# Patient Record
Sex: FEMALE | Employment: OTHER | URBAN - METROPOLITAN AREA
[De-identification: names, ages, dates, MRNs, and addresses within clinical notes are randomized per-mention and may not be internally consistent; named-entity substitution may affect disease eponyms.]

---

## 2018-02-04 ENCOUNTER — APPOINTMENT (OUTPATIENT)
Dept: GENERAL RADIOLOGY | Age: 63
End: 2018-02-04
Attending: EMERGENCY MEDICINE
Payer: MEDICARE

## 2018-02-04 ENCOUNTER — HOSPITAL ENCOUNTER (EMERGENCY)
Age: 63
Discharge: HOME | End: 2018-02-04
Attending: EMERGENCY MEDICINE | Admitting: EMERGENCY MEDICINE
Payer: MEDICARE

## 2018-02-04 VITALS
TEMPERATURE: 98.5 F | OXYGEN SATURATION: 100 % | DIASTOLIC BLOOD PRESSURE: 75 MMHG | RESPIRATION RATE: 16 BRPM | WEIGHT: 98.2 LBS | SYSTOLIC BLOOD PRESSURE: 122 MMHG | HEART RATE: 75 BPM | BODY MASS INDEX: 15.85 KG/M2

## 2018-02-04 DIAGNOSIS — Z43.1 ATTENTION TO G-TUBE (HCC): Primary | ICD-10-CM

## 2018-02-04 PROCEDURE — 99284 EMERGENCY DEPT VISIT MOD MDM: CPT

## 2018-02-04 PROCEDURE — 77030027899 HC CATH BLN GASTMY REPL BSC -B

## 2018-02-04 PROCEDURE — 75810000109 HC GASTRO TUBE CHANGE

## 2018-02-04 PROCEDURE — 74018 RADEX ABDOMEN 1 VIEW: CPT

## 2018-02-04 PROCEDURE — 74011636320 HC RX REV CODE- 636/320: Performed by: EMERGENCY MEDICINE

## 2018-02-04 RX ADMIN — DIATRIZOATE MEGLUMINE AND DIATRIZOATE SODIUM 30 ML: 600; 100 SOLUTION ORAL; RECTAL at 17:46

## 2018-02-04 NOTE — DISCHARGE INSTRUCTIONS
Home Tube Feeding: Care Instructions  Your Care Instructions  Tube feeding is a way of providing nutrition and fluids through a tube into the stomach or intestines. The tube may be inserted through the skin and into the stomach during surgery, or it may go through the mouth or nose, down the throat, and then into the stomach. Tube feeding can nourish people who have a short illness that makes swallowing difficult or people who have a severe illness, and it may prolong life. Follow-up care is a key part of your treatment and safety. Be sure to make and go to all appointments, and call your doctor if you are having problems. It's also a good idea to know your test results and keep a list of the medicines you take. How can you care for yourself at home? · Follow your doctor's instructions for use and care of the feeding tube. Your doctor will:  Nasreen [place] you what tube feeding formula and fluids to put through the tube. ¨ Show you how to care for the skin around the tube. Be sure to follow instructions on keeping the area clean. ¨ Teach you how to watch for infection or blockage of the tube. ¨ Tell you what activities you can do. · Keep the formula in the refrigerator after opening it. Do not let the formula in the hanging bag sit out at room temperature for more than 8 hours. For the caregiver  · Wash your hands before handling the tube and formula. Wash the top of the can of formula before you open it. · Tube feedings that go into the stomach: The person you are caring for needs to be sitting up or have his or her head up during the feeding and for 30 minutes afterward. These feedings can be given in about 30 minutes, five or six times throughout a day. · Tube feedings that go into the intestine: The person you are caring for will have a pump that slowly pushes the formula into the intestine over several hours. This is often done at night.   · If nausea, diarrhea, or stomach cramps happen during feeding, slow the rate that the formula comes through the tube. Then gradually increase the amount as the person can tolerate it. · Flush the tube with plain water after each feeding to keep it clean. Do not put anything other than formula or water through the tube unless your doctor has told you to. · Take care of yourself. ¨ Do not try to do everything yourself. Ask other family members to help, and find out what other types of help may be available. ¨ Eat well and get enough rest. Make sure you do not ignore your own health while you are caring for your loved one. ¨ Schedule time for yourself. Get out of the house to do things you enjoy, run errands, or go shopping. When should you call for help? Call your doctor now or seek immediate medical care if:  ? · You have signs of infection, such as:  ¨ Increased pain, swelling, warmth, or redness around the tube. ¨ Red streaks leading from the area where the tube is inserted. ¨ Pus draining from the tube area. ¨ A fever. ? · The tube comes out or becomes blocked. ? · You have nausea, vomiting, or diarrhea. ? Watch closely for changes in your health, and be sure to contact your doctor if:  ? · You have any problems with your feeding. Where can you learn more? Go to http://teresa-parviz.info/. Enter S558 in the search box to learn more about \"Home Tube Feeding: Care Instructions. \"  Current as of: May 12, 2017  Content Version: 11.4  © 3307-6567 [employer]. Care instructions adapted under license by [employer] (which disclaims liability or warranty for this information). If you have questions about a medical condition or this instruction, always ask your healthcare professional. [place] [address] any warranty or liability for your use of this information.

## 2018-02-04 NOTE — ED NOTES
Assumed care of patient from triage. Patient alert and sitting on bed. Patient's daughter at bedside. Per patient's daughter, patients G-tube is clogged and she is not able to administer patient's feedings. Daughter states that patient sees a GI doctor in [place]. Patient denies any pain. Denies any other complaints. Emergency Department Nursing Plan of Care       The Nursing Plan of Care is developed from the Nursing assessment and Emergency Department Attending provider initial evaluation. The plan of care may be reviewed in the ED Provider note.     The Plan of Care was developed with the following considerations:   Patient / Family readiness to learn indicated by:verbalized understanding  Persons(s) to be included in education: patient  Barriers to Learning/Limitations:No    Signed     Jaylon Luo RN    2/4/2018   5:06 PM

## 2018-02-04 NOTE — ED PROVIDER NOTES
EMERGENCY DEPARTMENT HISTORY AND PHYSICAL EXAM      Date: 2/4/2018  Patient Name: Cory Herr    History of Presenting Illness     Chief Complaint   Patient presents with    Feeding Tube Problem     family reports feeding tube became clogged today. History Provided By: Patient and Patient's daughter     HPI: Cory Herr, 60 y.o. female with PMHx significant for CKD, stroke, and HTN presents ambulatory to the ED with cc of a clogged feeding tube. The pt's daughter informs us that she was able to feed the patient this morning without any issues. Later on in the day she noticed that the pt's feeding tube had become clogged and a there was a small pinpoint hole in the tube. PCP: PROVIDER UNKNOWN    There are no other complaints, changes, or physical findings at this time. Current Outpatient Prescriptions   Medication Sig Dispense Refill    metoprolol tartrate (LOPRESSOR) 25 mg tablet Take 25 mg by mouth two (2) times a day.  metoclopramide HCl (REGLAN) 5 mg tablet Take 5 mg by mouth four (4) times daily.  POTASSIUM CHLORIDE PO 40 mEq by Feeding Tube route daily. 40 MEQ/5 ML      NUT. TX.IMPAIRED RENAL FXN,SOY (NEPRO PO) Take  by mouth. 1.5 CANS VIA FEEDING TUBE EVERY 4 HOURS PER DAUGHTER      aspirin 81 mg chewable tablet Take 81 mg by mouth daily.  CHOLECALCIFEROL, VITAMIN D3, (VITAMIN D3 PO) Take  by mouth Every Mon, Wed & Sun.         Past History     Past Medical History:  Past Medical History:   Diagnosis Date    Chronic kidney disease     ESRD/ARF Dialysi Tues-Thurs- Sat    Hypertension     Stroke ([facility] [address] 2013       Past Surgical History:  No past surgical history on file. Family History:  No family history on file. Social History:  Social History   Substance Use Topics    Smoking status: Never Smoker    Smokeless tobacco: None    Alcohol use No       Allergies:  No Known Allergies      Review of Systems   Review of Systems   Constitutional: Negative.   Negative for activity change, chills and diaphoresis. HENT: Negative. Negative for ear pain, trouble swallowing and voice change. Eyes: Negative. Respiratory: Negative for chest tightness and shortness of breath. Cardiovascular: Negative for chest pain, palpitations and leg swelling. Gastrointestinal: Negative. Negative for constipation, nausea and vomiting.        +G tube blockage   Endocrine: Negative. Genitourinary: Negative. Negative for flank pain, frequency and hematuria. Musculoskeletal: Negative. Skin: Negative. Allergic/Immunologic: Negative. Neurological: Negative. Hematological: Negative. Psychiatric/Behavioral: Negative. All other systems reviewed and are negative. Physical Exam   Physical Exam   Constitutional: She is oriented to person, place, and time. She appears well-developed and well-nourished. HENT:   Head: Normocephalic. Mouth/Throat: Oropharynx is clear and moist.   Eyes: Conjunctivae and EOM are normal. Pupils are equal, round, and reactive to light. Neck: Normal range of motion. Neck supple. Cardiovascular: Normal rate, regular rhythm, normal heart sounds and intact distal pulses. Pulmonary/Chest: Effort normal and breath sounds normal.   Abdominal: Soft. Bowel sounds are normal. She exhibits no distension. There is no rebound. G-tube in place   Musculoskeletal: Normal range of motion. She exhibits no edema or deformity. Neurological: She is alert and oriented to person, place, and time. Skin: Skin is warm and dry. Psychiatric: She has a normal mood and affect. Her behavior is normal. Judgment and thought content normal.   Nursing note and vitals reviewed. Diagnostic Study Results     EXAM:  XR ABD (KUB)     INDICATION:  Abdominal Pain. Clogged feeding tube today.     COMPARISON: None.     FINDINGS: A supine radiograph of the abdomen shows a percutaneous gastrostomy  tube with contrast material demonstrated within the stomach and duodenum. No  extraluminal contrast is shown. There is no bowel dilation. The lung bases are  normal. The bones are mildly osteopenic. Severe right hip osteoarthrosis is  shown as are postsurgical changes in the lower lumbar spine.     IMPRESSION  IMPRESSION: No evidence of bowel obstruction. Contrast material placed through  G-tube localized within stomach and duodenum. Medical Decision Making   I am the first provider for this patient. I reviewed the vital signs, available nursing notes, past medical history, past surgical history, family history and social history. Vital Signs-Reviewed the patient's vital signs. Patient Vitals for the past 12 hrs:   Temp Pulse Resp BP SpO2   02/04/18 1623 98.5 °F (36.9 °C) 75 16 122/75 100 %       Records Reviewed: Nursing Notes and Old Medical Records    Provider Notes (Medical Decision Making):   DDx: G-tube malfunction. Failure to the integrity of the tube at the exit of the skin and at the triple pool. ED Course:   Initial assessment performed. The patients presenting problems have been discussed, and they are in agreement with the care plan formulated and outlined with them. I have encouraged them to ask questions as they arise throughout their visit. 5:18 PM  The pt's malfunctioning NG tube will be replaced with a new one in the ED.     5:32 PM  The pt's NG tube was successfully swapped out with a new one. The placement of the tube will be confirmed with xray. 6:12 PM  BJ's final results have been reviewed with her. She has been counseled regarding her diagnosis. She verbally conveys understanding and agreement of the signs, symptoms, diagnosis, treatment and prognosis . Disposition:  DISCHARGE NOTE  6:12 PM  The patient has been re-evaluated and is ready for discharge. Reviewed available results with patient. Counseled pt on diagnosis and care plan. Pt has expressed understanding, and all questions have been answered.  Pt agrees with plan and agrees to F/U as recommended, or return to the ED if their sxs worsen. Discharge instructions have been provided and explained to the pt, along with reasons to return to the ED. PLAN:  1. Current Discharge Medication List        2. Follow-up Information     Follow up With Details Comments Contact Info    Phys Other, MD Call  Patient can only remember the practice name and not the physician      [facility] EMERGENCY DEPT  As needed, If symptoms worsen [address] [facility]  [phone]        Return to ED if worse     Diagnosis     Clinical Impression:   1. Attention to G-tube [facility])        Attestations: This note is prepared by Jovanna Vogel, acting as Scribe for Dr. Whitley Romero MD.    Dr. Whitley Romero MD: The scribe's documentation has been prepared under my direction and personally reviewed by me in its entirety. I confirm that the note above accurately reflects all work, treatment, procedures, and medical decision making performed by me.

## 2018-02-04 NOTE — ED NOTES
Patient (s)   given copy of dc instructions and  script(s). Patient(s)   verbalized understanding of instructions and script (s). Patient given a current medication reconciliation form and verbalized understanding of their medications. Patient (s)  verbalized understanding of the importance of discussing medications with  his or her physician or clinic when they follow up. Patient alert and oriented and in no acute distress. Pt verbalizes pain scale of 0 out of 10. Patient discharged home via [facility] with family.

## 2018-02-04 NOTE — LETTER
[facility] EMERGENCY DEPT 
[address] [place] [address] [address] [phone] Work/School Note Date: 2/4/2018 To Whom It May concern: 
 
Isidra Alvarez was seen and treated today in the emergency room by the following provider(s): 
Attending Provider: Lisa Sanches MD. Donaldo Garcia's daughter may return to work on 2/7/18. Sincerely, Lisa Sanches MD

## 2018-09-06 PROBLEM — R32 URINARY INCONTINENCE: Status: ACTIVE | Noted: 2018-09-06

## 2018-09-06 PROBLEM — G47.00 INSOMNIA: Status: ACTIVE | Noted: 2018-09-06

## 2018-09-06 PROBLEM — I10 HTN (HYPERTENSION): Status: ACTIVE | Noted: 2018-09-06

## 2018-09-06 PROBLEM — F01.53 VASCULAR DEMENTIA WITH DEPRESSED MOOD: Status: ACTIVE | Noted: 2018-09-06

## 2018-09-06 RX ORDER — METOCLOPRAMIDE 5 MG/1
5 TABLET ORAL
COMMUNITY
End: 2020-01-01 | Stop reason: SDUPTHER

## 2018-09-06 RX ORDER — HYDROXYZINE HYDROCHLORIDE 10 MG/5ML
SYRUP ORAL
COMMUNITY
End: 2018-11-02

## 2018-09-06 RX ORDER — DOCUSATE SODIUM 100 MG/1
100 CAPSULE, LIQUID FILLED ORAL 2 TIMES DAILY
COMMUNITY
End: 2020-01-01

## 2018-09-06 RX ORDER — GUAIFENESIN 100 MG/5ML
81 LIQUID (ML) ORAL DAILY
COMMUNITY
End: 2018-11-02 | Stop reason: SDUPTHER

## 2018-11-02 ENCOUNTER — OFFICE VISIT (OUTPATIENT)
Dept: FAMILY MEDICINE CLINIC | Age: 63
End: 2018-11-02

## 2018-11-02 VITALS
SYSTOLIC BLOOD PRESSURE: 99 MMHG | HEART RATE: 86 BPM | DIASTOLIC BLOOD PRESSURE: 63 MMHG | BODY MASS INDEX: 16.55 KG/M2 | TEMPERATURE: 99 F | HEIGHT: 66 IN | WEIGHT: 103 LBS | OXYGEN SATURATION: 96 %

## 2018-11-02 DIAGNOSIS — Z00.00 MEDICARE ANNUAL WELLNESS VISIT, SUBSEQUENT: ICD-10-CM

## 2018-11-02 DIAGNOSIS — Z99.2 STAGE 5 CHRONIC KIDNEY DISEASE ON CHRONIC DIALYSIS (HCC): ICD-10-CM

## 2018-11-02 DIAGNOSIS — R05.9 COUGH: ICD-10-CM

## 2018-11-02 DIAGNOSIS — I10 ESSENTIAL HYPERTENSION: Primary | ICD-10-CM

## 2018-11-02 DIAGNOSIS — N18.6 STAGE 5 CHRONIC KIDNEY DISEASE ON CHRONIC DIALYSIS (HCC): ICD-10-CM

## 2018-11-02 RX ORDER — AZITHROMYCIN 250 MG/1
TABLET, FILM COATED ORAL
Qty: 6 TAB | Refills: 0 | Status: SHIPPED | OUTPATIENT
Start: 2018-11-02 | End: 2018-11-07

## 2018-11-02 RX ORDER — BENZONATATE 200 MG/1
200 CAPSULE ORAL
Qty: 30 CAP | Refills: 0 | Status: SHIPPED | OUTPATIENT
Start: 2018-11-02 | End: 2018-11-09

## 2018-11-02 NOTE — PROGRESS NOTES
Rosita Montgomery is a 60 y.o. female Chief Complaint Patient presents with  Complete Physical  
 Medication Refill 1. Have you been to the ER, urgent care clinic since your last visit? Hospitalized since your last visit? no 
 
 
2. Have you seen or consulted any other health care providers outside of the [address] [place] [address] since your last visit? Include any pap smears or colon screening.   no

## 2018-11-02 NOTE — PATIENT INSTRUCTIONS
Medicare Wellness Visit, Female The best way to live healthy is to have a lifestyle where you eat a well-balanced diet, exercise regularly, limit alcohol use, and quit all forms of tobacco/nicotine, if applicable. Regular preventive services are another way to keep healthy. Preventive services (vaccines, screening tests, monitoring & exams) can help personalize your care plan, which helps you manage your own care. Screening tests can find health problems at the earliest stages, when they are easiest to treat. [facility] follows the current, evidence-based guidelines published by the [facility] Rodney Yuridia ([facility]) when recommending preventive services for our patients. Because we follow these guidelines, sometimes recommendations change over time as research supports it. (For example, mammograms used to be recommended annually. Even though Medicare will still pay for an annual mammogram, the newer guidelines recommend a mammogram every two years for women of average risk.) Of course, you and your doctor may decide to screen more often for some diseases, based on your risk and your health status. Preventive services for you include: - Medicare offers their members a free annual wellness visit, which is time for you and your primary care provider to discuss and plan for your preventive service needs. Take advantage of this benefit every year! 
-All adults over the age of 72 should receive the recommended pneumonia vaccines. Current USPSTF guidelines recommend a series of two vaccines for the best pneumonia protection.  
-All adults should have a flu vaccine yearly and a tetanus vaccine every 10 years. All adults age 61 and older should receive a shingles vaccine once in their lifetime.   
-A bone mass density test is recommended when a woman turns 65 to screen for osteoporosis. This test is only recommended one time, as a screening. Some providers will use this same test as a disease monitoring tool if you already have osteoporosis. -All adults age 38-68 who are overweight should have a diabetes screening test once every three years.  
-Other screening tests and preventive services for persons with diabetes include: an eye exam to screen for diabetic retinopathy, a kidney function test, a foot exam, and stricter control over your cholesterol.  
-Cardiovascular screening for adults with routine risk involves an electrocardiogram (ECG) at intervals determined by your doctor.  
-Colorectal cancer screenings should be done for adults age 54-65 with no increased risk factors for colorectal cancer. There are a number of acceptable methods of screening for this type of cancer. Each test has its own benefits and drawbacks. Discuss with your doctor what is most appropriate for you during your annual wellness visit. The different tests include: colonoscopy (considered the best screening method), a fecal occult blood test, a fecal DNA test, and sigmoidoscopy. -Breast cancer screenings are recommended every other year for women of normal risk, age 54-69. 
-Cervical cancer screenings for women over age 72 are only recommended with certain risk factors.  
-All adults born between [facility] should be screened once for Hepatitis C. Here is a list of your current Health Maintenance items (your personalized list of preventive services) with a due date: 
Health Maintenance Due Topic Date Due  
 Hepatitis C Test  1955  DTaP/Tdap/Td  (1 - Tdap) 12/15/1975  Cervical Cancer Screening  12/15/1975  Shingles Vaccine (1 of 2) 12/15/2004  Breast Cancer Screening  12/15/2004  Stool testing for trace blood  12/15/2004  Flu Vaccine  08/01/2018 Jabari Annual Well Visit  09/07/2018 Cough: Care Instructions Your Care Instructions A cough is your body's response to something that bothers your throat or airways. Many things can cause a cough. You might cough because of a cold or the flu, bronchitis, or asthma. Smoking, postnasal drip, allergies, and stomach acid that backs up into your throat also can cause coughs. A cough is a symptom, not a disease. Most coughs stop when the cause, such as a cold, goes away. You can take a few steps at home to cough less and feel better. Follow-up care is a key part of your treatment and safety. Be sure to make and go to all appointments, and call your doctor if you are having problems. It's also a good idea to know your test results and keep a list of the medicines you take. How can you care for yourself at home? · Drink lots of water and other fluids. This helps thin the mucus and soothes a dry or sore throat. Honey or lemon juice in hot water or tea may ease a dry cough. · Take cough medicine as directed by your doctor. · Prop up your head on pillows to help you breathe and ease a dry cough. · Try cough drops to soothe a dry or sore throat. Cough drops don't stop a cough. Medicine-flavored cough drops are no better than candy-flavored drops or hard candy. · Do not smoke. Avoid secondhand smoke. If you need help quitting, talk to your doctor about stop-smoking programs and medicines. These can increase your chances of quitting for good. When should you call for help? Call 911 anytime you think you may need emergency care. For example, call if: 
  · You have severe trouble breathing.  
 Call your doctor now or seek immediate medical care if: 
  · You cough up blood.  
  · You have new or worse trouble breathing.  
  · You have a new or higher fever.  
  · You have a new rash.  
 Watch closely for changes in your health, and be sure to contact your doctor if: 
  · You cough more deeply or more often, especially if you notice more mucus or a change in the color of your mucus.  
  · You have new symptoms, such as a sore throat, an earache, or sinus pain.   · You do not get better as expected. Where can you learn more? Go to http://teresa-parviz.info/. Enter D279 in the search box to learn more about \"Cough: Care Instructions. \" Current as of: December 6, 2017 Content Version: 11.8 © 1126-4957 [employer]. Care instructions adapted under license by [employer] (which disclaims liability or warranty for this information). If you have questions about a medical condition or this instruction, always ask your healthcare professional. [address] any warranty or liability for your use of this information. Medicare Wellness Visit, Female The best way to live healthy is to have a lifestyle where you eat a well-balanced diet, exercise regularly, limit alcohol use, and quit all forms of tobacco/nicotine, if applicable. Regular preventive services are another way to keep healthy. Preventive services (vaccines, screening tests, monitoring & exams) can help personalize your care plan, which helps you manage your own care. Screening tests can find health problems at the earliest stages, when they are easiest to treat. [facility] follows the current, evidence-based guidelines published by the [place] Rodney Yuridia (USPSTF) when recommending preventive services for our patients. Because we follow these guidelines, sometimes recommendations change over time as research supports it. (For example, mammograms used to be recommended annually. Even though Medicare will still pay for an annual mammogram, the newer guidelines recommend a mammogram every two years for women of average risk.) Of course, you and your doctor may decide to screen more often for some diseases, based on your risk and your health status. Preventive services for you include: - Medicare offers their members a free annual wellness visit, which is time for you and your primary care provider to discuss and plan for your preventive service needs. Take advantage of this benefit every year! 
-All adults over the age of 72 should receive the recommended pneumonia vaccines. Current USPSTF guidelines recommend a series of two vaccines for the best pneumonia protection.  
-All adults should have a flu vaccine yearly and a tetanus vaccine every 10 years. All adults age 61 and older should receive a shingles vaccine once in their lifetime.   
-A bone mass density test is recommended when a woman turns 65 to screen for osteoporosis. This test is only recommended one time, as a screening. Some providers will use this same test as a disease monitoring tool if you already have osteoporosis. -All adults age 38-68 who are overweight should have a diabetes screening test once every three years.  
-Other screening tests and preventive services for persons with diabetes include: an eye exam to screen for diabetic retinopathy, a kidney function test, a foot exam, and stricter control over your cholesterol.  
-Cardiovascular screening for adults with routine risk involves an electrocardiogram (ECG) at intervals determined by your doctor.  
-Colorectal cancer screenings should be done for adults age 54-65 with no increased risk factors for colorectal cancer. There are a number of acceptable methods of screening for this type of cancer. Each test has its own benefits and drawbacks. Discuss with your doctor what is most appropriate for you during your annual wellness visit. The different tests include: colonoscopy (considered the best screening method), a fecal occult blood test, a fecal DNA test, and sigmoidoscopy.  
-Breast cancer screenings are recommended every other year for women of normal risk, age 54-69. 
-Cervical cancer screenings for women over age 72 are only recommended with certain risk factors.  
-All adults born between [birth year] and 1966 should be screened once for Hepatitis C. Here is a list of your current Health Maintenance items (your personalized list of preventive services) with a due date: 
Health Maintenance Due Topic Date Due  
 Hepatitis C Test  1955  DTaP/Tdap/Td  (1 - Tdap) 12/15/1975  Cervical Cancer Screening  12/15/1975  Shingles Vaccine (1 of 2) 12/15/2004  Breast Cancer Screening  12/15/2004  Stool testing for trace blood  12/15/2004

## 2018-11-02 NOTE — PROGRESS NOTES
Assessment/Plan:  
 
Diagnoses and all orders for this visit: 1. Essential hypertension 
 -Stable, daughter will call back for refill (does not know if is tartrate or succinate)  Monitor BP at home to make sure it is not dropping too low 2. Stage 5 chronic kidney disease on chronic dialysis ([facility] [address] -managed by specialist 
 
3. Medicare annual wellness visit, subsequent 
 -labs ordered by dialysis team 
 
4. Cough 
-     benzonatate (TESSALON) 200 mg capsule; Take 1 Cap by mouth three (3) times daily as needed for Cough for up to 7 days. -     azithromycin (ZITHROMAX) 250 mg tablet; Take 2 tablets today, then take 1 tablet daily -     XR CHEST PA LAT; Future - Worsening, start zpack today, chest xray today Follow-up Disposition: 
Return in about 1 year (around 11/2/2019). Discussed expected course/resolution/complications of diagnosis in detail with patient.   
Medication risks/benefits/costs/interactions/alternatives discussed with patient.   
Pt was given after visit summary which includes diagnoses, current medications & vitals. Pt expressed understanding with the diagnosis and plan Subjective:  
  
Issac Harvey is a 60 y.o. female who presents for had concerns including Complete Physical and Medication Refill. Here today with daughter and son for CPE and med refill. Here with daughter. Has a history of hypertension. On chronic dialysis. Takes 25mg Metoprolol twice a day. BP today is 99/63 today. Does not drink alcohol. Wheelchair bound. Daughter reports home is safe with grab bars and no rugs for when Ms. Gosia Stephens does get out of the wheelchair. Daughter reports diminished hearing but does not want hearing aids. Daughter states that Dialysis has given her the flu vaccine. Does not get mammograms or pap smears anymore. Cough Has had a cough for 4 days getting worse. Cough is non-productive. Use robitussin with minimal relief. Denies fever.   Denies every having pneumonia. Has had both pneumonia immunizations. Current Outpatient Medications Medication Sig Dispense Refill  benzonatate (TESSALON) 200 mg capsule Take 1 Cap by mouth three (3) times daily as needed for Cough for up to 7 days. 30 Cap 0  
 azithromycin (ZITHROMAX) 250 mg tablet Take 2 tablets today, then take 1 tablet daily 6 Tab 0  METOPROLOL SUCCINATE PO Take  by mouth.  docusate sodium (COLACE) 100 mg capsule Take 100 mg by mouth two (2) times a day.  metoprolol tartrate (LOPRESSOR) 25 mg tablet Take 25 mg by mouth two (2) times a day.  POTASSIUM CHLORIDE PO 40 mEq by Feeding Tube route daily. 40 MEQ/5 ML    
 NUT. TX.IMPAIRED RENAL FXN,SOY (NEPRO PO) Take  by mouth. 1.5 CANS VIA FEEDING TUBE EVERY 4 HOURS PER DAUGHTER  aspirin 81 mg chewable tablet Take 81 mg by mouth daily.  CHOLECALCIFEROL, VITAMIN D3, (VITAMIN D3 PO) Take  by mouth Every Mon, Wed & Sun.    
 metoclopramide HCl (REGLAN) 5 mg tablet Take 5 mg by mouth Before breakfast, lunch, and dinner.  DARBEPOETIN JORDAN IN POLYSORBAT INJECTION by Injection route.  metoclopramide HCl (REGLAN) 5 mg tablet Take 5 mg by mouth four (4) times daily. No Known Allergies ROS:  
Review of Systems Constitutional: Negative for fever. HENT: Positive for congestion. Negative for ear pain and sore throat. Respiratory: Positive for cough. Negative for sputum production and wheezing. Cardiovascular: Negative for chest pain. Skin: Negative for rash. Neurological: Negative for dizziness and headaches. Objective:  
 
Visit Vitals BP 99/63 (BP 1 Location: Right arm, BP Patient Position: Sitting) Pulse 86 Temp 99 °F (37.2 °C) (Oral) Ht 5' 6\" (1.676 m) Wt 103 lb (46.7 kg) LMP  (LMP Unknown) SpO2 96% BMI 16.62 kg/m² Vitals and Nurse Documentation reviewed. Physical Exam  
Constitutional: She is well-developed, well-nourished, and in no distress. No distress.   
HENT:  
 Head: Normocephalic and atraumatic. Cardiovascular: Normal rate, regular rhythm and normal heart sounds. Exam reveals no gallop and no friction rub. No murmur heard. Pulmonary/Chest: Effort normal and breath sounds normal. No respiratory distress. She has no wheezes. She has no rales. Musculoskeletal:  
     Right ankle: She exhibits swelling. Left ankle: She exhibits swelling. Lymphadenopathy:  
     Right cervical: No superficial cervical adenopathy present. Left cervical: No superficial cervical adenopathy present. Neurological: She is alert. Skin: She is not diaphoretic. Psychiatric: Affect normal.  
 
 
Results for orders placed or performed during the hospital encounter of 09/16/16 EKG, 12 LEAD, INITIAL Result Value Ref Range Ventricular Rate 70 BPM  
 Atrial Rate 70 BPM  
 P-R Interval 124 ms QRS Duration 78 ms Q-T Interval 430 ms QTC Calculation (Bezet) 464 ms Calculated P Axis 68 degrees Calculated R Axis 57 degrees Calculated T Axis 44 degrees Diagnosis Normal sinus rhythm Right atrial enlargement No previous ECGs available Confirmed by Marcela Pelayo MD, [facility] ([id number]) on 9/16/2016 2:51:32 PM

## 2018-11-07 RX ORDER — METOPROLOL TARTRATE 25 MG/1
25 TABLET, FILM COATED ORAL 2 TIMES DAILY
Qty: 180 TAB | Refills: 1 | Status: SHIPPED | OUTPATIENT
Start: 2018-11-07 | End: 2019-08-14 | Stop reason: SDUPTHER

## 2018-11-07 NOTE — PROGRESS NOTES
This is the Subsequent Medicare Annual Wellness Exam, performed 12 months or more after the Initial AWV or the last Subsequent AWV I have reviewed the patient's medical history in detail and updated the computerized patient record. History Past Medical History:  
Diagnosis Date  CHF (congestive heart failure) ([facility] [address] 10/2013  Chronic kidney disease ESRD/ARF Dialysi Tu-Thurs- Sat  Hypertension  Insomnia  Severe mitral regurgitation 2013  Stroke [facility])   Urinary incontinence  Vascular dementia with depressed mood Past Surgical History:  
Procedure Laterality Date  HX BACK SURGERY    
 3 herniated discs  HX  SECTION Current Outpatient Medications Medication Sig Dispense Refill  benzonatate (TESSALON) 200 mg capsule Take 1 Cap by mouth three (3) times daily as needed for Cough for up to 7 days. 30 Cap 0  
 azithromycin (ZITHROMAX) 250 mg tablet Take 2 tablets today, then take 1 tablet daily 6 Tab 0  
 docusate sodium (COLACE) 100 mg capsule Take 100 mg by mouth two (2) times a day.  POTASSIUM CHLORIDE PO 40 mEq by Feeding Tube route daily. 40 MEQ/5 ML    
 NUT. TX.IMPAIRED RENAL FXN,SOY (NEPRO PO) Take  by mouth. 1.5 CANS VIA FEEDING TUBE EVERY 4 HOURS PER DAUGHTER  aspirin 81 mg chewable tablet Take 81 mg by mouth daily.  CHOLECALCIFEROL, VITAMIN D3, (VITAMIN D3 PO) Take  by mouth Every Mon, Wed & Sun.    
 metoprolol tartrate (LOPRESSOR) 25 mg tablet Take 1 Tab by mouth two (2) times a day. 180 Tab 1  
 metoclopramide HCl (REGLAN) 5 mg tablet Take 5 mg by mouth Before breakfast, lunch, and dinner.  DARBEPOETIN JORDAN IN POLYSORBAT INJECTION by Injection route.  metoclopramide HCl (REGLAN) 5 mg tablet Take 5 mg by mouth four (4) times daily. No Known Allergies Family History Problem Relation Age of Onset  Diabetes Mother Type 2  
 Heart Failure Father  Diabetes Sister 3 sisters  Diabetes Brother   
     3 brothers  Hypertension Daughter Social History Tobacco Use  Smoking status: Former Smoker Types: Cigarettes Last attempt to quit: 2013 Years since quittin.8  Smokeless tobacco: Never Used Substance Use Topics  Alcohol use: No  
 
Patient Active Problem List  
Diagnosis Code  Vascular dementia with depressed mood F01.51, F32.9  
 Urinary incontinence R32  
 HTN (hypertension) I10  
 Insomnia G47.00  Chronic kidney disease N18.9  Hypertension I10 Depression Risk Factor Screening: No flowsheet data found. Alcohol Risk Factor Screening: You do not drink alcohol or very rarely. Functional Ability and Level of Safety:  
Hearing Loss Hearing is good. Activities of Daily Living The home contains: handrails and grab bars Patient needs help with:  transportation, shopping, preparing meals, laundry, housework, managing medications, eating, dressing, bathing and hygiene Fall Risk No flowsheet data found. Abuse Screen Patient is not abused Cognitive Screening Evaluation of Cognitive Function: 
Has your family/caregiver stated any concerns about your memory: no 
Abnormal 
 
Patient Care Team  
Patient Care Team: 
Yuki Herrera NP as PCP - General (Nurse Practitioner) Sunni Reynolds MD (Cardiology) Timothy Green MD (Nephrology) Hipolito Daniels MD (Neurology) Assessment/Plan Education and counseling provided: 
Are appropriate based on today's review and evaluation Pneumococcal Vaccine Influenza Vaccine Diagnoses and all orders for this visit: 1. Essential hypertension 
 -stable, monitor bp at home, watch for too low BP 2. Stage 5 chronic kidney disease on chronic dialysis ([facility] [address] -managed by specialist 
 
3. Medicare annual wellness visit, subsequent 4. Cough 
-     benzonatate (TESSALON) 200 mg capsule; Take 1 Cap by mouth three (3) times daily as needed for Cough for up to 7 days. -     azithromycin (ZITHROMAX) 250 mg tablet; Take 2 tablets today, then take 1 tablet daily -     XR CHEST PA LAT; Future Health Maintenance Due Topic Date Due  
 Hepatitis C Screening  1955  DTaP/Tdap/Td series (1 - Tdap) 12/15/1975  PAP AKA CERVICAL CYTOLOGY  12/15/1975  Shingrix Vaccine Age 50> (1 of 2) 12/15/2004  BREAST CANCER SCRN MAMMOGRAM  12/15/2004  FOBT Q 1 YEAR AGE 50-75  12/15/2004

## 2019-02-08 ENCOUNTER — TELEPHONE (OUTPATIENT)
Dept: FAMILY MEDICINE CLINIC | Age: 64
End: 2019-02-08

## 2019-02-08 NOTE — TELEPHONE ENCOUNTER
TC to Mrs. Sabrina Barnes daughter,  Stephen Grimaldo we need to get [address] fax number to fax over completed Serious Medical Condition Certification Form. Form fax to @ [phone] Also copy  mailed to Mrs. Sabrina Barnes home and  A copy mailed to [facility].           - Ebenezer

## 2019-05-06 ENCOUNTER — HOSPITAL ENCOUNTER (OUTPATIENT)
Dept: GENERAL RADIOLOGY | Age: 64
Discharge: HOME | End: 2019-05-06
Payer: MEDICARE

## 2019-05-06 DIAGNOSIS — Z93.1 S/P PERCUTANEOUS ENDOSCOPIC GASTROSTOMY (PEG) TUBE PLACEMENT (HCC): ICD-10-CM

## 2019-05-06 DIAGNOSIS — R13.10 DYSPHAGIA: ICD-10-CM

## 2019-05-06 PROCEDURE — 92611 MOTION FLUOROSCOPY/SWALLOW: CPT

## 2019-05-06 PROCEDURE — 74230 X-RAY XM SWLNG FUNCJ C+: CPT

## 2019-05-06 NOTE — PROGRESS NOTES
[address] [place] [address]    Speech Pathology Modified barium swallow Study with cms g codes  Patient: Shola Colón (69 y.o. female)  Date: 2019  Referring Provider:     SUBJECTIVE:   Pt had a CVA in Oct, 2013. Her course was long and complicated. A PEG was placed in  per daughter. The family has been giving her some po over the past 6 months. She developed pneumonia in Dec but the daughter did not think it was aspiration related. OBJECTIVE:   Past Medical History:   Past Medical History:   Diagnosis Date    CHF (congestive heart failure) ([address] 10/2013    Chronic kidney disease     ESRD/ARF Dialysi - Sat    Hypertension     Insomnia     Severe mitral regurgitation 2013    [facility]roke [facility])     Urinary incontinence     Vascular dementia with depressed mood      Past Surgical History:   Procedure Laterality Date    HX BACK SURGERY      3 herniated discs    HX  SECTION       Current Dietary Status:  NPO with PEG feedings   Radiologist: Dr. Jairo Reddy Views: Lateral;Fluoro  Patient Position: sitting upright in transmotion chair    Trial 1:   Consistency Presented: Thin liquid; Nectar thick liquid;Honey thick liquid;Puree; Solid   How Presented: Cup/sip       Bolus Acceptance: No impairment   Bolus Formation/Control: Impaired: Delayed;Mastication;Posterior;Premature spillage   Propulsion: Delayed (# of seconds)   Oral Residue: Lingual       Timing: Pooling 1-5 sec   Penetration: Flash/transient;Trace;During swallow; To cords; To laryngeal vestibule;From initial swallow(with honey thick liquids ; just on underside of the epiglottis)   Aspiration/Timing: Before;During;From initial swallow   Pharyngeal Clearance: No residue                       Decreased Tongue Base Retraction?: No  Laryngeal Elevation: Incomplete laryngeal closure  Aspiration/Penetration Score: 7 (Aspiration-Contrast passes below the cords/, but is not ejected despite attempt)             Oral Phase Severity: Mild-moderate  Pharyngeal Phase Severity: Moderate      Based on the objective assessment provided within this note, the NOMS would be 4          NOMS Swallowing Levels:  Level 1 (CN): NPO  Level 2 (CM): NPO but takes consistency in therapy  Level 3 (CL): Takes less than 50% of nutrition p.o. and continues with nonoral feedings; and/or safe with mod cues; and/or max diet restriction  Level 4 (CK): Safe swallow but needs mod cues; and/or mod diet restriction; and/or still requires some nonoral feeding/supplements  Level 5 (CJ): Safe swallow with min diet restriction; and/or needs min cues  Level 6 (CI): Independent with p.o.; rare cues; usually self cues; may need to avoid some foods or needs extra time  Level 7 ([address] [place] [address]): Independent for all p.o.  LAUREN. (2003). National Outcomes Measurement System (NOMS): Adult Speech-Language Pathology User's Guide. ASSESSMENT :  Based on the objective data described above, the patient presents with mild to mod oral and mod pharyngeal dysphagia. Orally she accepted the cup and spoon appropriately. Mastication was mildly slow as was posterior propulsion with purees and solids. Premature spillage of all liquid consistencies was noted with the swallow triggered at the level of the pyriform sinus. With purees the swallow was mild to mod delayed. There was no pharyngeal residue. With thins there was aspiration before /during the swallow due to swallow delay and reduced laryngeal closure. She coughed but could not clear the significant aspiration. With nectar thick liquids there was trace penetration during the swallow that was mid vestibule but penetrated deeper to the TVCs after the swallow that was not cleared. With honey the liquids penetrated under the epiglottis and cleared with subsequent swallows. Tolerated purees and solids.       PLAN/RECOMMENDATIONS :  Soft diet with honey thick liquids; small bites and sips  Discussed with the pt's daughter and caregiver, Bora Blazing that all liquids must be honey thick. Showed Paula Shaw and Bora Blazing a picture of the thickener online and discussed purchasing it at a drugstore. They asked if it is paid for by insurance and they were informed it is not. They asked when the PEG could come out and they were informed only after pt is eating and drinking enough to warrant that. Concern is for getting sufficient hydration with honey thick liquids. That will be for the physician to decide. Follow up at [facility] for OP follow up would be helpful to insure they are following through as educated. COMMUNICATION/EDUCATION:   The above findings and recommendations were discussed with: Registered Nurse who verbalized understanding.     Thank you for this referral.  YISEL Simon  Time Calculation: 20 mins

## 2019-07-09 ENCOUNTER — TELEPHONE (OUTPATIENT)
Dept: FAMILY MEDICINE CLINIC | Age: 64
End: 2019-07-09

## 2019-07-09 NOTE — TELEPHONE ENCOUNTER
Layla Pandya', coordinator, for [facility] called to let us know that on July 2nd at the Dialysis Ctr, she flat lined while placing a port placement in her chest, so she was sent to the H/O. She received a blood transfusion on July 3rd. Was d/c on July 4th. Will be seen by home health this Friday to reassess her. No reply is needed.   Thank you,  indra

## 2019-07-29 ENCOUNTER — TELEPHONE (OUTPATIENT)
Dept: FAMILY MEDICINE CLINIC | Age: 64
End: 2019-07-29

## 2019-07-29 NOTE — TELEPHONE ENCOUNTER
Vanessa from [facility] called asking for a nurse to give her call back, she evaluated Ms Deja Ramos post hospital visit and would like to update our nursing staff on her status for her PCP Np Montague.  She was admitted on 7/2, discharged 7/4    Areli Godfrey: [phone]

## 2019-08-02 NOTE — TELEPHONE ENCOUNTER
TC to Mrs. Magalie Stone daughter Corinne Vargas, she was told that we received a call from Mrs. Raymond Escoto @ [facility] and she stated  At the visit with the  patient had some increased verbal outburst also issues with sun downing .       Per Corinne Vargas , her mom is having issues but she would like to make her mom a appt for hand swelling for now, she was transferred to the  for a appt

## 2019-08-14 ENCOUNTER — OFFICE VISIT (OUTPATIENT)
Dept: FAMILY MEDICINE CLINIC | Age: 64
End: 2019-08-14

## 2019-08-14 VITALS
BODY MASS INDEX: 20.89 KG/M2 | WEIGHT: 130 LBS | TEMPERATURE: 98.5 F | DIASTOLIC BLOOD PRESSURE: 92 MMHG | RESPIRATION RATE: 16 BRPM | OXYGEN SATURATION: 99 % | SYSTOLIC BLOOD PRESSURE: 174 MMHG | HEIGHT: 66 IN | HEART RATE: 84 BPM

## 2019-08-14 DIAGNOSIS — F05 SUNDOWNING: Primary | ICD-10-CM

## 2019-08-14 DIAGNOSIS — L03.113 CELLULITIS OF RIGHT UPPER EXTREMITY: ICD-10-CM

## 2019-08-14 DIAGNOSIS — I10 ESSENTIAL HYPERTENSION: ICD-10-CM

## 2019-08-14 DIAGNOSIS — Z43.1 ATTENTION TO G-TUBE (HCC): ICD-10-CM

## 2019-08-14 DIAGNOSIS — F51.04 PSYCHOPHYSIOLOGICAL INSOMNIA: ICD-10-CM

## 2019-08-14 DIAGNOSIS — Z99.2 STAGE 5 CHRONIC KIDNEY DISEASE ON CHRONIC DIALYSIS (HCC): ICD-10-CM

## 2019-08-14 DIAGNOSIS — N18.6 STAGE 5 CHRONIC KIDNEY DISEASE ON CHRONIC DIALYSIS (HCC): ICD-10-CM

## 2019-08-14 RX ORDER — TRAZODONE HYDROCHLORIDE 50 MG/1
TABLET ORAL
Qty: 30 TAB | Refills: 1 | Status: SHIPPED | OUTPATIENT
Start: 2019-08-14 | End: 2020-01-01 | Stop reason: SDUPTHER

## 2019-08-14 RX ORDER — DOXYCYCLINE 100 MG/1
100 TABLET ORAL 2 TIMES DAILY
Qty: 20 TAB | Refills: 0 | Status: SHIPPED | OUTPATIENT
Start: 2019-08-14 | End: 2019-08-24

## 2019-08-14 RX ORDER — METOPROLOL TARTRATE 50 MG/1
50 TABLET ORAL 2 TIMES DAILY
Qty: 60 TAB | Refills: 1 | Status: SHIPPED | OUTPATIENT
Start: 2019-08-14 | End: 2020-01-01 | Stop reason: SDUPTHER

## 2019-08-14 NOTE — PROGRESS NOTES
Identified pt with two pt identifiers(name and ). Reviewed record in preparation for visit and have obtained necessary documentation. Chief Complaint   Patient presents with    Hand Swelling     Right hand swelling x        Health Maintenance Due   Topic    Hepatitis C Screening     Pneumococcal 0-64 years (1 of 3 - PCV13)    DTaP/Tdap/Td series (1 - Tdap)    PAP AKA CERVICAL CYTOLOGY     Shingrix Vaccine Age 50> (1 of 2)    BREAST CANCER SCRN MAMMOGRAM     FOBT Q 1 YEAR AGE 54-65     Influenza Age 5 to Adult        Coordination of Care Questionnaire:  :   1) Have you been to an emergency room, urgent care, or hospitalized since your last visit? If yes, where when, and reason for visit? Yes, ER      2. Have seen or consulted any other health care provider since your last visit? If yes, where when, and reason for visit? NO        Patient is accompanied by self I have received verbal consent from Dante Oviedo to discuss any/all medical information while they are present in the room.

## 2019-08-14 NOTE — PROGRESS NOTES
Toro Pierce is a 58 y.o. female who presents today with the following:    HPI  Chief Complaint   Patient presents with    Hand Swelling     Right hand swelling x       1. Sundowning    2. Attention to G-tube [facility])    3. Stage 5 chronic kidney disease on chronic dialysis (HCC)    4. Cellulitis of right upper extremity    5. Essential hypertension    6. Psychophysiological insomnia    Patient is here for follow-up of right hand swelling and erythema. Associated symptoms include pain. Patient has an AV fistula in place on the right arm and swelling is coming from that. Patient is being followed by vascular and nephrology. The plan is to close the AV fistula on the right hand and place another fistula. Patient was previously given a short course of antibiotic which did improve some of the swelling and erythema in the right hand. Patient continues to have erythema now and requests a second course of antibiotic to resolve cellulitis. Patient's daughter reports patient gets severe anxious and confused at the end of the day. She also has insomnia. Review of Systems   Constitutional: Negative. Wheelchair-bound   HENT: Negative. Eyes: Negative. Respiratory: Negative. Cardiovascular: Negative. Gastrointestinal: Negative. G-tube in place   Genitourinary: Negative. Musculoskeletal: Negative. Hand pain and swelling   Skin: Negative. Neurological: Negative. Endo/Heme/Allergies: Negative. Psychiatric/Behavioral: Positive for memory loss. The patient is nervous/anxious. Physical Exam   Constitutional: She is oriented to person, place, and time and well-developed, well-nourished, and in no distress. HENT:   Head: Normocephalic and atraumatic. Right Ear: External ear normal.   Left Ear: External ear normal.   Nose: Nose normal.   Mouth/Throat: No oropharyngeal exudate. Eyes: Conjunctivae are normal.   Neck: Normal range of motion. Neck supple.  No thyromegaly present. Cardiovascular: Normal rate and regular rhythm. Pulmonary/Chest: Effort normal and breath sounds normal.   Abdominal: Soft. Bowel sounds are normal. She exhibits no distension. There is no tenderness. G-tube in place   Musculoskeletal: Normal range of motion. She exhibits no edema. Lymphadenopathy:     She has no cervical adenopathy. Neurological: She is alert and oriented to person, place, and time. Skin: Skin is warm and dry. There is erythema. Psychiatric: Mood and affect normal.   Nursing note and vitals reviewed. BP (!) 174/92 (BP 1 Location: Left arm, BP Patient Position: Sitting)   Pulse 84   Temp 98.5 °F (36.9 °C) (Oral)   Resp 16   Ht 5' 6\" (1.676 m)   Wt 130 lb (59 kg)   SpO2 99%   BMI 20.98 kg/m²     No Known Allergies    Current Outpatient Medications   Medication Sig    doxycycline (ADOXA) 100 mg tablet 1 Tab by Per G Tube route two (2) times a day for 10 days.  metoprolol tartrate (LOPRESSOR) 50 mg tablet Take 1 Tab by mouth two (2) times a day.  traZODone (DESYREL) 50 mg tablet TAKE 1/2 TAB PER G TUBE AT BEDTIME    metoclopramide HCl (REGLAN) 5 mg tablet Take 5 mg by mouth Before breakfast, lunch, and dinner.  docusate sodium (COLACE) 100 mg capsule Take 100 mg by mouth two (2) times a day.  DARBEPOETIN JORDAN IN POLYSORBAT INJECTION by Injection route.  metoclopramide HCl (REGLAN) 5 mg tablet Take 5 mg by mouth four (4) times daily.  POTASSIUM CHLORIDE PO 40 mEq by Feeding Tube route daily. 40 MEQ/5 ML    NUT. TX.IMPAIRED RENAL FXN,SOY (NEPRO PO) Take  by mouth. 1.5 CANS VIA FEEDING TUBE EVERY 4 HOURS PER DAUGHTER    aspirin 81 mg chewable tablet Take 81 mg by mouth daily.  CHOLECALCIFEROL, VITAMIN D3, (VITAMIN D3 PO) Take  by mouth Every Mon, Wed & Sun. No current facility-administered medications for this visit.         Past Medical History:   Diagnosis Date    CHF (congestive heart failure) ([facility] [address] 10/2013    Chronic kidney disease ESRD/ARF Dialysi Thurs Sat    Hypertension     Insomnia     Severe mitral regurgitation 2013    [facility])     Urinary incontinence     Vascular dementia with depressed mood        Past Surgical History:   Procedure Laterality Date    HX BACK SURGERY      3 herniated discs    HX  SECTION         Problem List  Date Reviewed: 2019          Codes Class Noted    Chronic kidney disease ICD-10-CM: N18.9  ICD-9-CM: 653. 9  Unknown    Overview Signed 2018  8:57 AM by Ciara Salinas LPN     ESRD/ARF Dialysi TuesThurs- Sat             Hypertension ICD-10-CM: I10  ICD-9-CM: 401.9  Unknown        Vascular dementia with depressed mood ICD-10-CM: F01.51, F32.9  ICD-9-CM: 290.43  2018        Urinary incontinence ICD-10-CM: R32  ICD-9-CM: 788.30  2018        HTN (hypertension) ICD-10-CM: I10  ICD-9-CM: 401.9  2018        Insomnia ICD-10-CM: G47.00  ICD-9-CM: 780.52  2018               No results found for this visit on 19. 1. Attention to G-tube ([facility] [address]  Stable    2. Stage 5 chronic kidney disease on chronic dialysis ([facility] [address]  On dialysis    3. Sundowning    - traZODone (DESYREL) 50 mg tablet; TAKE 1/2 TAB PER G TUBE AT BEDTIME  Dispense: 30 Tab; Refill: 1    4. Cellulitis of right upper extremity  Patient is scheduled to get AV fistula repair.  - doxycycline (ADOXA) 100 mg tablet; 1 Tab by Per G Tube route two (2) times a day for 10 days. Dispense: 20 Tab; Refill: 0    5. Essential hypertension    - metoprolol tartrate (LOPRESSOR) 50 mg tablet; Take 1 Tab by mouth two (2) times a day. Dispense: 60 Tab; Refill: 1    6. Psychophysiological insomnia    - traZODone (DESYREL) 50 mg tablet; TAKE 1/2 TAB PER G TUBE AT BEDTIME  Dispense: 30 Tab; Refill: 1        Follow-up and Dispositions    · Return in about 2 weeks (around 2019) for follow up, BP. I have discussed the diagnosis with the patient and the intended plan as seen in the above orders.   The patient has received an after-visit summary and questions were answered concerning future plans. I have discussed medication side effects and warnings with the patient as well. The patient agrees and understands above plan.            Marcello Fonseca MD

## 2019-08-23 ENCOUNTER — ANESTHESIA (OUTPATIENT)
Dept: SURGERY | Age: 64
End: 2019-08-23
Payer: MEDICARE

## 2019-08-23 ENCOUNTER — ANESTHESIA EVENT (OUTPATIENT)
Dept: SURGERY | Age: 64
End: 2019-08-23
Payer: MEDICARE

## 2019-08-23 ENCOUNTER — HOSPITAL ENCOUNTER (OUTPATIENT)
Age: 64
Setting detail: OUTPATIENT SURGERY
Discharge: HOME | End: 2019-08-23
Attending: SURGERY | Admitting: SURGERY
Payer: MEDICARE

## 2019-08-23 VITALS
DIASTOLIC BLOOD PRESSURE: 72 MMHG | RESPIRATION RATE: 16 BRPM | SYSTOLIC BLOOD PRESSURE: 149 MMHG | OXYGEN SATURATION: 99 % | HEART RATE: 74 BPM | TEMPERATURE: 98.8 F

## 2019-08-23 DIAGNOSIS — N18.6 ESRD (END STAGE RENAL DISEASE) (HCC): Primary | ICD-10-CM

## 2019-08-23 LAB
ANION GAP BLD CALC-SCNC: 14 MMOL/L (ref 10–20)
BUN BLD-MCNC: 51 MG/DL (ref 9–20)
CA-I BLD-MCNC: 0.99 MMOL/L (ref 1.12–1.32)
CHLORIDE BLD-SCNC: 105 MMOL/L (ref 98–107)
CO2 BLD-SCNC: 23 MMOL/L (ref 21–32)
CREAT BLD-MCNC: 7.1 MG/DL (ref 0.6–1.3)
GLUCOSE BLD-MCNC: 93 MG/DL (ref 65–100)
HCT VFR BLD CALC: 36 % (ref 35–47)
POTASSIUM BLD-SCNC: 4.7 MMOL/L (ref 3.5–5.1)
SERVICE CMNT-IMP: ABNORMAL
SODIUM BLD-SCNC: 136 MMOL/L (ref 136–145)

## 2019-08-23 PROCEDURE — 76210000020 HC REC RM PH II FIRST 0.5 HR: Performed by: SURGERY

## 2019-08-23 PROCEDURE — 74011250636 HC RX REV CODE- 250/636: Performed by: SURGERY

## 2019-08-23 PROCEDURE — 77030039266 HC ADH SKN EXOFIN S2SG -A: Performed by: SURGERY

## 2019-08-23 PROCEDURE — 77030002996 HC SUT SLK J&J -A: Performed by: SURGERY

## 2019-08-23 PROCEDURE — 76010000138 HC OR TIME 0.5 TO 1 HR: Performed by: SURGERY

## 2019-08-23 PROCEDURE — 77030002933 HC SUT MCRYL J&J -A: Performed by: SURGERY

## 2019-08-23 PROCEDURE — 76060000032 HC ANESTHESIA 0.5 TO 1 HR: Performed by: SURGERY

## 2019-08-23 PROCEDURE — 74011250636 HC RX REV CODE- 250/636: Performed by: NURSE ANESTHETIST, CERTIFIED REGISTERED

## 2019-08-23 PROCEDURE — 77030018836 HC SOL IRR NACL ICUM -A: Performed by: SURGERY

## 2019-08-23 PROCEDURE — 74011000272 HC RX REV CODE- 272: Performed by: SURGERY

## 2019-08-23 PROCEDURE — 74011000250 HC RX REV CODE- 250: Performed by: SURGERY

## 2019-08-23 PROCEDURE — 77030031139 HC SUT VCRL2 J&J -A: Performed by: SURGERY

## 2019-08-23 PROCEDURE — 77030020782 HC GWN BAIR PAWS FLX 3M -B

## 2019-08-23 PROCEDURE — 74011250636 HC RX REV CODE- 250/636: Performed by: ANESTHESIOLOGY

## 2019-08-23 PROCEDURE — 80047 BASIC METABLC PNL IONIZED CA: CPT

## 2019-08-23 PROCEDURE — 76210000006 HC OR PH I REC 0.5 TO 1 HR: Performed by: SURGERY

## 2019-08-23 PROCEDURE — 74011000250 HC RX REV CODE- 250: Performed by: NURSE ANESTHETIST, CERTIFIED REGISTERED

## 2019-08-23 PROCEDURE — 77030011640 HC PAD GRND REM COVD -A: Performed by: SURGERY

## 2019-08-23 PROCEDURE — 74011000258 HC RX REV CODE- 258: Performed by: NURSE ANESTHETIST, CERTIFIED REGISTERED

## 2019-08-23 RX ORDER — SODIUM CHLORIDE, SODIUM LACTATE, POTASSIUM CHLORIDE, CALCIUM CHLORIDE 600; 310; 30; 20 MG/100ML; MG/100ML; MG/100ML; MG/100ML
INJECTION, SOLUTION INTRAVENOUS
Status: DISCONTINUED | OUTPATIENT
Start: 2019-08-23 | End: 2019-08-23

## 2019-08-23 RX ORDER — SODIUM CHLORIDE 0.9 % (FLUSH) 0.9 %
5-40 SYRINGE (ML) INJECTION EVERY 8 HOURS
Status: DISCONTINUED | OUTPATIENT
Start: 2019-08-23 | End: 2019-08-23 | Stop reason: HOSPADM

## 2019-08-23 RX ORDER — OXYCODONE HYDROCHLORIDE 5 MG/1
5 TABLET ORAL
Qty: 20 TAB | Refills: 0 | Status: SHIPPED | OUTPATIENT
Start: 2019-08-23 | End: 2019-08-26

## 2019-08-23 RX ORDER — SODIUM CHLORIDE 9 MG/ML
50 INJECTION, SOLUTION INTRAVENOUS CONTINUOUS
Status: DISCONTINUED | OUTPATIENT
Start: 2019-08-23 | End: 2019-08-23 | Stop reason: HOSPADM

## 2019-08-23 RX ORDER — OXYCODONE AND ACETAMINOPHEN 5; 325 MG/1; MG/1
1 TABLET ORAL AS NEEDED
Status: DISCONTINUED | OUTPATIENT
Start: 2019-08-23 | End: 2019-08-23 | Stop reason: HOSPADM

## 2019-08-23 RX ORDER — MIDAZOLAM HYDROCHLORIDE 1 MG/ML
0.5 INJECTION, SOLUTION INTRAMUSCULAR; INTRAVENOUS
Status: DISCONTINUED | OUTPATIENT
Start: 2019-08-23 | End: 2019-08-23 | Stop reason: HOSPADM

## 2019-08-23 RX ORDER — FENTANYL CITRATE 50 UG/ML
50 INJECTION, SOLUTION INTRAMUSCULAR; INTRAVENOUS AS NEEDED
Status: DISCONTINUED | OUTPATIENT
Start: 2019-08-23 | End: 2019-08-23 | Stop reason: HOSPADM

## 2019-08-23 RX ORDER — ONDANSETRON 2 MG/ML
4 INJECTION INTRAMUSCULAR; INTRAVENOUS AS NEEDED
Status: DISCONTINUED | OUTPATIENT
Start: 2019-08-23 | End: 2019-08-23 | Stop reason: HOSPADM

## 2019-08-23 RX ORDER — SODIUM CHLORIDE 0.9 % (FLUSH) 0.9 %
5-40 SYRINGE (ML) INJECTION AS NEEDED
Status: DISCONTINUED | OUTPATIENT
Start: 2019-08-23 | End: 2019-08-23 | Stop reason: HOSPADM

## 2019-08-23 RX ORDER — SODIUM CHLORIDE, SODIUM LACTATE, POTASSIUM CHLORIDE, CALCIUM CHLORIDE 600; 310; 30; 20 MG/100ML; MG/100ML; MG/100ML; MG/100ML
75 INJECTION, SOLUTION INTRAVENOUS CONTINUOUS
Status: DISCONTINUED | OUTPATIENT
Start: 2019-08-23 | End: 2019-08-23 | Stop reason: HOSPADM

## 2019-08-23 RX ORDER — FENTANYL CITRATE 50 UG/ML
25 INJECTION, SOLUTION INTRAMUSCULAR; INTRAVENOUS
Status: DISCONTINUED | OUTPATIENT
Start: 2019-08-23 | End: 2019-08-23 | Stop reason: HOSPADM

## 2019-08-23 RX ORDER — MIDAZOLAM HYDROCHLORIDE 1 MG/ML
1 INJECTION, SOLUTION INTRAMUSCULAR; INTRAVENOUS AS NEEDED
Status: DISCONTINUED | OUTPATIENT
Start: 2019-08-23 | End: 2019-08-23 | Stop reason: HOSPADM

## 2019-08-23 RX ORDER — MORPHINE SULFATE 10 MG/ML
2 INJECTION, SOLUTION INTRAMUSCULAR; INTRAVENOUS
Status: DISCONTINUED | OUTPATIENT
Start: 2019-08-23 | End: 2019-08-23 | Stop reason: HOSPADM

## 2019-08-23 RX ORDER — PROPOFOL 10 MG/ML
INJECTION, EMULSION INTRAVENOUS
Status: DISCONTINUED | OUTPATIENT
Start: 2019-08-23 | End: 2019-08-23 | Stop reason: HOSPADM

## 2019-08-23 RX ORDER — DIPHENHYDRAMINE HYDROCHLORIDE 50 MG/ML
12.5 INJECTION, SOLUTION INTRAMUSCULAR; INTRAVENOUS AS NEEDED
Status: DISCONTINUED | OUTPATIENT
Start: 2019-08-23 | End: 2019-08-23 | Stop reason: HOSPADM

## 2019-08-23 RX ORDER — LIDOCAINE HYDROCHLORIDE 10 MG/ML
0.1 INJECTION, SOLUTION EPIDURAL; INFILTRATION; INTRACAUDAL; PERINEURAL AS NEEDED
Status: DISCONTINUED | OUTPATIENT
Start: 2019-08-23 | End: 2019-08-23 | Stop reason: HOSPADM

## 2019-08-23 RX ORDER — HYDROMORPHONE HYDROCHLORIDE 1 MG/ML
0.2 INJECTION, SOLUTION INTRAMUSCULAR; INTRAVENOUS; SUBCUTANEOUS
Status: DISCONTINUED | OUTPATIENT
Start: 2019-08-23 | End: 2019-08-23 | Stop reason: HOSPADM

## 2019-08-23 RX ORDER — SODIUM CHLORIDE 9 MG/ML
INJECTION, SOLUTION INTRAVENOUS
Status: DISCONTINUED | OUTPATIENT
Start: 2019-08-23 | End: 2019-08-23 | Stop reason: HOSPADM

## 2019-08-23 RX ORDER — CEFAZOLIN SODIUM/WATER 2 G/20 ML
2 SYRINGE (ML) INTRAVENOUS
Status: COMPLETED | OUTPATIENT
Start: 2019-08-23 | End: 2019-08-23

## 2019-08-23 RX ORDER — PROPOFOL 10 MG/ML
INJECTION, EMULSION INTRAVENOUS AS NEEDED
Status: DISCONTINUED | OUTPATIENT
Start: 2019-08-23 | End: 2019-08-23 | Stop reason: HOSPADM

## 2019-08-23 RX ADMIN — PROPOFOL 10 MG: 10 INJECTION, EMULSION INTRAVENOUS at 15:02

## 2019-08-23 RX ADMIN — Medication 2 G: at 14:42

## 2019-08-23 RX ADMIN — SODIUM CHLORIDE: 900 INJECTION, SOLUTION INTRAVENOUS at 14:38

## 2019-08-23 RX ADMIN — PROPOFOL 50 MG: 10 INJECTION, EMULSION INTRAVENOUS at 15:05

## 2019-08-23 RX ADMIN — SODIUM CHLORIDE 4 MCG: 900 INJECTION, SOLUTION INTRAVENOUS at 15:03

## 2019-08-23 RX ADMIN — PROPOFOL 35 MCG/KG/MIN: 10 INJECTION, EMULSION INTRAVENOUS at 14:39

## 2019-08-23 RX ADMIN — SODIUM CHLORIDE 50 ML/HR: 900 INJECTION, SOLUTION INTRAVENOUS at 14:03

## 2019-08-23 RX ADMIN — SODIUM CHLORIDE 4 MCG: 900 INJECTION, SOLUTION INTRAVENOUS at 15:02

## 2019-08-23 RX ADMIN — SODIUM CHLORIDE 0.3 MCG/KG/HR: 900 INJECTION, SOLUTION INTRAVENOUS at 14:39

## 2019-08-23 RX ADMIN — PROPOFOL 40 MG: 10 INJECTION, EMULSION INTRAVENOUS at 14:58

## 2019-08-23 RX ADMIN — SODIUM CHLORIDE 4 MCG: 900 INJECTION, SOLUTION INTRAVENOUS at 15:01

## 2019-08-23 NOTE — ANESTHESIA PREPROCEDURE EVALUATION
Anesthetic History   No history of anesthetic complications            Review of Systems / Medical History  Patient summary reviewed, nursing notes reviewed and pertinent labs reviewed    Pulmonary  Within defined limits                 Neuro/Psych       CVA  TIA     Cardiovascular    Hypertension      CHF             GI/Hepatic/Renal         Renal disease: ESRD       Endo/Other  Within defined limits           Other Findings   Comments: Vascular dementia           Physical Exam    Airway  Mallampati: III  TM Distance: 4 - 6 cm  Neck ROM: normal range of motion   Mouth opening: Normal     Cardiovascular    Rhythm: regular  Rate: normal         Dental    Dentition: Poor dentition     Pulmonary  Breath sounds clear to auscultation               Abdominal  Abdominal exam normal       Other Findings            Anesthetic Plan    ASA: 3  Anesthesia type: MAC          Induction: Intravenous  Anesthetic plan and risks discussed with: Patient

## 2019-08-23 NOTE — ANESTHESIA POSTPROCEDURE EVALUATION
Post-Anesthesia Evaluation and Assessment    Patient: Heather Davis MRN: [id number]  SSN: [id number]    YOB: 1955  Age: 58 y.o. Sex: female      I have evaluated the patient and they are stable and ready for discharge from the PACU. Cardiovascular Function/Vital Signs  Visit Vitals  BP (P) 107/64   Pulse (P) 86   Temp 37.1 °C (98.7 °F)   Resp (P) 16   SpO2 (P) 98%       Patient is status post MAC anesthesia for Procedure(s):  LIGATION OF RIGHT ARM ARTERIO-VENOUS GRAFT. Nausea/Vomiting: None    Postoperative hydration reviewed and adequate. Pain:  Pain Scale 1: Numeric (0 - 10) (08/23/19 1357)  Pain Intensity 1: 0 (08/23/19 1357)   Managed    Neurological Status:   Neuro (WDL): Exceptions to WDL(pain and weakness bilat legs) (08/23/19 1343)   At baseline    Mental Status, Level of Consciousness: Alert and  oriented to person, place, and time    Pulmonary Status:   O2 Device: (P) Room air (08/23/19 1530)   Adequate oxygenation and airway patent    Complications related to anesthesia: None    Post-anesthesia assessment completed.  No concerns    Signed By: Harriet Nissen, MD     August 23, 2019

## 2019-08-23 NOTE — BRIEF OP NOTE
BRIEF OPERATIVE NOTE    Date of Procedure: 8/23/2019   Preoperative Diagnosis: END STAGE RENAL DISEASE  Postoperative Diagnosis: * No post-op diagnosis entered *    Procedure(s):  LIGATION OF RIGHT ARM ARTERIO-VENOUS GRAFT  Surgeon(s) and Role:     Emilee Hernández MD - Primary  Surgical Assistant: Fany Pitt  Surgical Staff:  Circ-1: Norbert Ortega RN  Scrub Tech-1: Pasha Guerra  Scrub Tech-Relief: Nikko Calle  Surg Asst-1: Sanjana Lucero  Surg Asst-Relief: Speedy Bauer  Event Time In Time Out   Incision Start 1500    Incision Close       Anesthesia: MAC   Estimated Blood Loss: minimal  Specimens: * No specimens in log *   Findings: mild edema around graft  Complications: none  Implants: * No implants in log *

## 2019-08-23 NOTE — PERIOP NOTES
Patient: Swati Cueto MRN: [id number]  SSN: [id number]   YOB: 1955  Age: 58 y.o. Sex: female     Patient is status post Procedure(s):  LIGATION OF RIGHT ARM ARTERIO-VENOUS GRAFT. Surgeon(s) and Role:     Emilee Hernández MD - Primary    Local/Dose/Irrigation:  7 ml 1% Lidocaine with Sodium Bicarb. Injected to right upper arm.                  Peripheral IV 08/23/19 Left Forearm (Active)   Site Assessment Clean, dry, & intact 8/23/2019  2:00 PM   Phlebitis Assessment 0 8/23/2019  2:00 PM   Infiltration Assessment 0 8/23/2019  2:00 PM   Dressing Status Clean, dry, & intact 8/23/2019  2:00 PM   Dressing Type Transparent 8/23/2019  2:00 PM   Hub Color/Line Status Blue 8/23/2019  2:00 PM   Alcohol Cap Used Yes 8/23/2019  2:00 PM                       Dressing/Packing:  Wound Arm Right-Dressing Type: Topical skin adhesive/glue (08/23/19 1500)

## 2019-08-24 NOTE — OP NOTES
[address]  OPERATIVE REPORT    Name:  Esperanza Stephens  MR#:  [id number]  :  1955  ACCOUNT #:  [de-identified]  DATE OF SERVICE:  2019    PREOPERATIVE DIAGNOSIS:  End-stage renal disease and swelling. POSTOPERATIVE DIAGNOSIS:  End-stage renal disease and swelling. PROCEDURE PERFORMED:  Ligation of right arm graft. SURGEON:  MD Yolanda Hernandez. ANESTHESIA:  Local with sedation. COMPLICATIONS:  None. SPECIMENS REMOVED:  None. IMPLANTS:  none. ESTIMATED BLOOD LOSS:  Minimal.    DRAINS:  None. INDICATIONS FOR PROCEDURE:  The patient is a 41-year-old severely demented female with a right arm graft and severe right arm swelling that has been causing her problems, including cellulitis. 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 She presents for graft ligation to prevent arm swelling and problems. She is dialyzing via a tunneled catheter. She has been the catheter only patient due to her significant medical problems to avoid further surgery. Risks and benefits of the procedure were discussed with the patient's daughter. They voiced understanding and wished to proceed. PROCEDURE:  The patient was placed supine on the operating room table, sedation was established by Anesthesia Department. Right arm was prepped and draped in standard surgical fashion. Portion of the graft just beyond the anastomosis were identified. The skin above this was infiltrated with 1% lidocaine. A small incision was made here longitudinally along the graft, taken down through the skin and subcutaneous tissue. The graft was easily identified, dissected out right angle, was placed underneath and two 0 silk ties were used to ligate the graft. Following this, the wound was irrigated with antibiotic solution, closed the layers with 3-0 Vicryl followed by 4-0 Monocryl subcuticular skin closure and skin glue.   Counts were correct at the end of the case x2. The patient was then awoken and transported to the recovery room in stable condition.       Jose Alejandro Marino MD [email or web address]  D:  08/23/2019 15:21  T:  08/23/2019 22:09  JOB #:  [id number]  CC:  Yo Kurtz NP

## 2019-09-05 ENCOUNTER — OP HISTORICAL/CONVERTED ENCOUNTER (OUTPATIENT)
Dept: OTHER | Age: 64
End: 2019-09-05

## 2019-09-06 ENCOUNTER — TELEPHONE (OUTPATIENT)
Dept: FAMILY MEDICINE CLINIC | Age: 64
End: 2019-09-06

## 2019-09-06 NOTE — TELEPHONE ENCOUNTER
Vanessa Hernandez [facility] called regarding pt. Pt has been in and out or the ER multiple times. [phone] asking nurse to call back to provide nurse a update.

## 2019-09-11 ENCOUNTER — TELEPHONE (OUTPATIENT)
Dept: FAMILY MEDICINE CLINIC | Age: 64
End: 2019-09-11

## 2019-09-11 NOTE — TELEPHONE ENCOUNTER
Taran Kerr from [facility] returning call to [place] regarding patient    Tel: [phone]

## 2019-09-13 ENCOUNTER — OFFICE VISIT (OUTPATIENT)
Dept: FAMILY MEDICINE CLINIC | Age: 64
End: 2019-09-13

## 2019-09-13 VITALS
RESPIRATION RATE: 18 BRPM | BODY MASS INDEX: 17.52 KG/M2 | SYSTOLIC BLOOD PRESSURE: 127 MMHG | HEIGHT: 66 IN | HEART RATE: 68 BPM | TEMPERATURE: 98.6 F | WEIGHT: 109 LBS | DIASTOLIC BLOOD PRESSURE: 78 MMHG | OXYGEN SATURATION: 100 %

## 2019-09-13 DIAGNOSIS — K06.8 BLEEDING GUMS: ICD-10-CM

## 2019-09-13 DIAGNOSIS — Z99.2 STAGE 5 CHRONIC KIDNEY DISEASE ON CHRONIC DIALYSIS (HCC): Primary | ICD-10-CM

## 2019-09-13 DIAGNOSIS — F01.53 VASCULAR DEMENTIA WITH DEPRESSED MOOD: ICD-10-CM

## 2019-09-13 DIAGNOSIS — M79.89 SWELLING OF RIGHT HAND: ICD-10-CM

## 2019-09-13 DIAGNOSIS — N18.6 STAGE 5 CHRONIC KIDNEY DISEASE ON CHRONIC DIALYSIS (HCC): Primary | ICD-10-CM

## 2019-09-13 PROBLEM — R63.39 IMPAIRED ORAL FEEDING: Status: ACTIVE | Noted: 2019-09-13

## 2019-09-13 NOTE — PROGRESS NOTES
Julia Dozier is a 58 y.o. female      Chief Complaint   Patient presents with   [facility] Follow Up     Bleeding gums 9/6/19         1. Have you been to the ER, urgent care clinic since your last visit? Hospitalized since your last visit? Yes  Bleeding gums 9/6/19    2. Have you seen or consulted any other health care providers outside of the [address] [place] [address] since your last visit? Include any pap smears or colon screening.   No

## 2019-09-13 NOTE — PATIENT INSTRUCTIONS
Learning About Dementia  What is dementia? We all forget things as we get older. Many older people have a slight loss of memory that does not affect their daily lives. But memory loss that gets worse may mean that you have dementia. Dementia is a loss of mental skills that affects your daily life. It can cause problems with memory, problem-solving, and learning. It also can cause problems with thinking and planning. Dementia usually gets worse over time. But how quickly it gets worse is different for each person. Some people stay the same for years. Others lose skills quickly. Your chances of having dementia rise as you get older. But this doesn't mean that everyone will get it. How is dementia diagnosed? To diagnose dementia, your doctor will:  · Do a physical exam.  · Ask questions about recent and past illnesses and life events. The doctor will want to talk to a close family member to check details. · Ask you to do some simple things that test your memory and other mental skills. Your doctor may ask you to tell what day and year it is, repeat a series of words, or draw a clock face. The doctor may do tests to look for a cause that can be treated. For example, you might have blood tests to check your thyroid or to look for an infection. You might also have a test that shows a picture of your brain, like an MRI or a CT scan. These tests can help your doctor find a tumor or brain injury. Knowing the type of dementia a person has can help the doctor prescribe medicines or other treatments. What are the symptoms? Usually the first symptom of dementia is memory loss. Often the person who has the memory problem doesn't notice it, but family and friends do. People who have dementia may have increasing trouble with:  · Recalling recent events. They may forget appointments or lose objects. · Recognizing people and places. · Keeping up with conversations and activity.   · Finding their way around familiar places, or driving to and from places they know well. · Keeping up personal care such as grooming or bathing. · Planning and carrying out routine tasks. They may have trouble following a recipe or writing a letter or email. How is dementia treated? Medicines for dementia can slow it down for a while and make it easier to live with. Medicines can't cure it. But they may help improve mental function, mood, or behavior. If a stroke caused the dementia, doing things to reduce the chance of another stroke may help. They include eating healthy foods, being active, staying at a healthy weight, and not smoking. As dementia gets worse, a person may get depressed or angry and upset. An active social life, counseling, and sometimes medicine may help with changing emotions. The goals of ongoing treatment are to keep the person safely at home as long as possible and to provide support and guidance to the caregivers. The person will need routine follow-up visits. The doctor will monitor medicines and the person's level of functioning. Follow-up care is a key part of your treatment and safety. Be sure to make and go to all appointments, and call your doctor if you are having problems. It's also a good idea to know your test results and keep a list of the medicines you take. Where can you learn more? Go to http://teresa-parviz.info/. Enter [phone] [id number] [phone] in the search box to learn more about \"Learning About Dementia. \"  Current as of: September 11, 2018  Content Version: 12.1  © 7844-7922 [employer]. Care instructions adapted under license by [employer] (which disclaims liability or warranty for this information). If you have questions about a medical condition or this instruction, always ask your healthcare professional. [place] [address] any warranty or liability for your use of this information.

## 2019-09-13 NOTE — TELEPHONE ENCOUNTER
Jorge Luis Virgen has called again from [facility]    Please call back at   [phone]

## 2019-09-13 NOTE — PROGRESS NOTES
Assessment/Plan:     Diagnoses and all orders for this visit:    1. Stage 5 chronic kidney disease on chronic dialysis ([facility] [address]   -presumed stable, managed by dialysis center    2. Bleeding gums  -     CBC WITH AUTOMATED DIFF  - Improved, labs today    3. Vascular dementia with depressed mood  - Stable at this time. Mood was happy    4. Swelling of right hand   -improving, warm compresses to area a couple times a day. Follow-up and Dispositions    · Return for PRN. Discussed expected course/resolution/complications of diagnosis in detail with patient. Medication risks/benefits/costs/interactions/alternatives discussed with patient. Pt was given after visit summary which includes diagnoses, current medications & vitals. Pt expressed understanding with the diagnosis and plan        Subjective:      Bri Snow is a 58 y.o. female who presents for had concerns including Hospital Follow Up (Bleeding gums 9/6/19). Here today with caregiver. Pt in wheelchair. He is there till daughter comes homes. Pt has 24 hour care. Hospital follow up. Currently on dialysis and got a new port and came home bleeding gums and bleeding from the port placement problems. Had to be taken to the hospital and stayed overnight. caregiver states numerous problems with this dialysis center and they are changing centers. Port placement on right side cuased hand to swell up significantly. Patient has dementia. VS stable today. Current Outpatient Medications   Medication Sig Dispense Refill    metoprolol tartrate (LOPRESSOR) 50 mg tablet Take 1 Tab by mouth two (2) times a day. 60 Tab 1    traZODone (DESYREL) 50 mg tablet TAKE 1/2 TAB PER G TUBE AT BEDTIME 30 Tab 1    metoclopramide HCl (REGLAN) 5 mg tablet Take 5 mg by mouth Before breakfast, lunch, and dinner.  docusate sodium (COLACE) 100 mg capsule Take 100 mg by mouth two (2) times a day.       DARBEPOETIN JORDAN IN POLYSORBAT INJECTION by Injection route.      metoclopramide HCl (REGLAN) 5 mg tablet Take 5 mg by mouth four (4) times daily.  POTASSIUM CHLORIDE PO 40 mEq by Feeding Tube route daily. 40 MEQ/5 ML      NUT. TX.IMPAIRED RENAL FXN,SOY (NEPRO PO) Take  by mouth. 1.5 CANS VIA FEEDING TUBE EVERY 4 HOURS PER DAUGHTER      aspirin 81 mg chewable tablet Take 81 mg by mouth daily.  CHOLECALCIFEROL, VITAMIN D3, (VITAMIN D3 PO) Take  by mouth Every Mon, Wed & Sun.          No Known Allergies  Past Medical History:   Diagnosis Date    CHF (congestive heart failure) ([facility] [address] 10/2013    Chronic kidney disease     ESRD/ARF Dialysi Tues-Thurs- Sat    Hypertension     Insomnia     Severe mitral regurgitation 2013    Stroke [facility])     Urinary incontinence     Vascular dementia with depressed mood      Past Surgical History:   Procedure Laterality Date    HX BACK SURGERY      3 herniated discs    HX  SECTION       Family History   Problem Relation Age of Onset    Diabetes Mother         Type 2    Heart Failure Father     Diabetes Sister         3 sisters    Diabetes Brother         3 brothers    Hypertension Daughter      Social History     Socioeconomic History    Marital status:      Spouse name: Not on file    Number of children: Not on file    Years of education: Not on file    Highest education level: Not on file   Occupational History    Not on file   Social Needs    Financial resource strain: Not on file    Food insecurity:     Worry: Not on file     Inability: Not on file    Transportation needs:     Medical: Not on file     Non-medical: Not on file   Tobacco Use    Smoking status: Former Smoker     Types: Cigarettes     Last attempt to quit: 2013     Years since quittin.7    Smokeless tobacco: Never Used   Substance and Sexual Activity    Alcohol use: No    Drug use: No    Sexual activity: Not Currently   Lifestyle    Physical activity:     Days per week: Not on file     Minutes per session: Not on file    Stress: Not on file   Relationships    Social connections:     Talks on phone: Not on file     Gets together: Not on file     Attends Jew service: Not on file     Active member of club or organization: Not on file     Attends meetings of clubs or organizations: Not on file     Relationship status: Not on file    Intimate partner violence:     Fear of current or ex partner: Not on file     Emotionally abused: Not on file     Physically abused: Not on file     Forced sexual activity: Not on file   Other Topics Concern    Not on file   Social History Narrative    Not on file       HPI      ROS:   Review of Systems   Constitutional: Negative for fever and malaise/fatigue. Respiratory: Negative for cough and shortness of breath. Cardiovascular: Negative for chest pain, palpitations and leg swelling. Neurological: Negative for dizziness and headaches. Objective:     Visit Vitals  /78 (BP 1 Location: Left arm, BP Patient Position: Sitting)   Pulse 68   Temp 98.6 °F (37 °C) (Oral)   Resp 18   Ht 5' 6\" (1.676 m)   Wt 109 lb (49.4 kg)   SpO2 100%   BMI 17.59 kg/m²         Vitals and Nurse Documentation reviewed. Physical Exam   Constitutional: She is oriented to person, place, and time and well-developed, well-nourished, and in no distress. Vital signs are normal. No distress. HENT:   Head: Normocephalic and atraumatic. Cardiovascular: Normal rate, regular rhythm and normal heart sounds. Exam reveals no gallop and no friction rub. No murmur heard. Pulmonary/Chest: Effort normal and breath sounds normal. No respiratory distress. She has no wheezes. She has no rales. Musculoskeletal:        Right hand: She exhibits decreased range of motion and swelling. Right hand noted for swelling   Neurological: She is alert and oriented to person, place, and time. Skin: Skin is warm, dry and intact. She is not diaphoretic. No cyanosis. No pallor.    Psychiatric: Affect normal. Her mood appears not anxious. She is not agitated. She does not exhibit a depressed mood. She expresses no homicidal and no suicidal ideation.        Results for orders placed or performed during the hospital encounter of 08/23/19   POC CHEM8   Result Value Ref Range    Calcium, ionized (POC) 0.99 (L) 1.12 - 1.32 mmol/L    Sodium (POC) 136 136 - 145 mmol/L    Potassium (POC) 4.7 3.5 - 5.1 mmol/L    Chloride (POC) 105 98 - 107 mmol/L    CO2 (POC) 23 21 - 32 mmol/L    Anion gap (POC) 14 10 - 20 mmol/L    Glucose (POC) 93 65 - 100 mg/dL    BUN (POC) 51 (H) 9 - 20 mg/dL    Creatinine (POC) 7.1 (H) 0.6 - 1.3 mg/dL    GFRAA, POC 7 (L) >60 ml/min/1.73m2    GFRNA, POC 6 (L) >60 ml/min/1.73m2    Hematocrit (POC) 36 35.0 - 47.0 %    Comment Notified RN or MD immediately by

## 2019-09-18 NOTE — TELEPHONE ENCOUNTER
RC- to [facility], just wanted  To update us on   Mrs. Garcia health concerns which  She will fax over this information later today, Mrs. Zehra Lopez daughter is trying to get  Mrs. Garcia dialysis  which to a different location , she is not happy with the care her mom has been receiving

## 2020-01-01 ENCOUNTER — TELEPHONE (OUTPATIENT)
Dept: FAMILY MEDICINE CLINIC | Age: 65
End: 2020-01-01

## 2020-01-01 ENCOUNTER — DOCUMENTATION ONLY (OUTPATIENT)
Dept: FAMILY MEDICINE CLINIC | Age: 65
End: 2020-01-01

## 2020-01-01 ENCOUNTER — ED HISTORICAL/CONVERTED ENCOUNTER (OUTPATIENT)
Dept: OTHER | Age: 65
End: 2020-01-01

## 2020-01-01 ENCOUNTER — IP HISTORICAL/CONVERTED ENCOUNTER (OUTPATIENT)
Dept: OTHER | Age: 65
End: 2020-01-01

## 2020-01-01 ENCOUNTER — HOSPITAL ENCOUNTER (OUTPATIENT)
Dept: GENERAL RADIOLOGY | Age: 65
Discharge: HOME | End: 2020-12-03
Attending: EMERGENCY MEDICINE

## 2020-01-01 ENCOUNTER — OP HISTORICAL/CONVERTED ENCOUNTER (OUTPATIENT)
Dept: OTHER | Age: 65
End: 2020-01-01

## 2020-01-01 ENCOUNTER — HOSPITAL ENCOUNTER (EMERGENCY)
Age: 65
Discharge: HOME | End: 2020-12-03
Attending: EMERGENCY MEDICINE
Payer: MEDICARE

## 2020-01-01 ENCOUNTER — HOSPITAL ENCOUNTER (OUTPATIENT)
Dept: INFUSION THERAPY | Age: 65
Discharge: HOME | End: 2020-11-20
Attending: INTERNAL MEDICINE | Admitting: INTERNAL MEDICINE
Payer: MEDICARE

## 2020-01-01 ENCOUNTER — VIRTUAL VISIT (OUTPATIENT)
Dept: FAMILY MEDICINE CLINIC | Age: 65
End: 2020-01-01
Payer: MEDICARE

## 2020-01-01 ENCOUNTER — HOSPITAL ENCOUNTER (OUTPATIENT)
Dept: LAB | Age: 65
Discharge: HOME | End: 2020-06-17

## 2020-01-01 ENCOUNTER — VIRTUAL VISIT (OUTPATIENT)
Dept: FAMILY MEDICINE CLINIC | Age: 65
End: 2020-01-01

## 2020-01-01 ENCOUNTER — APPOINTMENT (OUTPATIENT)
Dept: CT IMAGING | Age: 65
End: 2020-01-01
Attending: EMERGENCY MEDICINE
Payer: MEDICARE

## 2020-01-01 ENCOUNTER — OFFICE VISIT (OUTPATIENT)
Dept: FAMILY MEDICINE CLINIC | Age: 65
End: 2020-01-01

## 2020-01-01 VITALS
OXYGEN SATURATION: 100 % | TEMPERATURE: 98.6 F | SYSTOLIC BLOOD PRESSURE: 127 MMHG | HEART RATE: 86 BPM | RESPIRATION RATE: 16 BRPM | DIASTOLIC BLOOD PRESSURE: 79 MMHG

## 2020-01-01 VITALS
DIASTOLIC BLOOD PRESSURE: 69 MMHG | HEIGHT: 64 IN | OXYGEN SATURATION: 99 % | TEMPERATURE: 102.5 F | WEIGHT: 125 LBS | BODY MASS INDEX: 21.34 KG/M2 | RESPIRATION RATE: 18 BRPM | SYSTOLIC BLOOD PRESSURE: 110 MMHG | HEART RATE: 106 BPM

## 2020-01-01 VITALS
RESPIRATION RATE: 16 BRPM | SYSTOLIC BLOOD PRESSURE: 105 MMHG | HEART RATE: 80 BPM | TEMPERATURE: 98.6 F | DIASTOLIC BLOOD PRESSURE: 70 MMHG | WEIGHT: 109 LBS | BODY MASS INDEX: 17.52 KG/M2 | OXYGEN SATURATION: 98 % | HEIGHT: 66 IN

## 2020-01-01 VITALS
BODY MASS INDEX: 21.34 KG/M2 | RESPIRATION RATE: 23 BRPM | HEART RATE: 109 BPM | OXYGEN SATURATION: 100 % | WEIGHT: 125 LBS | DIASTOLIC BLOOD PRESSURE: 71 MMHG | HEIGHT: 64 IN | TEMPERATURE: 100.4 F | SYSTOLIC BLOOD PRESSURE: 126 MMHG

## 2020-01-01 DIAGNOSIS — S00.03XA CONTUSION OF SCALP, INITIAL ENCOUNTER: Primary | ICD-10-CM

## 2020-01-01 DIAGNOSIS — Z93.1 GASTROSTOMY TUBE DEPENDENT (HCC): ICD-10-CM

## 2020-01-01 DIAGNOSIS — Z86.73 HISTORY OF STROKE: ICD-10-CM

## 2020-01-01 DIAGNOSIS — F01.53 VASCULAR DEMENTIA WITH DEPRESSED MOOD: ICD-10-CM

## 2020-01-01 DIAGNOSIS — R13.10 DYSPHAGIA, UNSPECIFIED TYPE: ICD-10-CM

## 2020-01-01 DIAGNOSIS — R11.0 NAUSEA: ICD-10-CM

## 2020-01-01 DIAGNOSIS — E61.1 IRON DEFICIENCY: ICD-10-CM

## 2020-01-01 DIAGNOSIS — K21.9 GASTROESOPHAGEAL REFLUX DISEASE WITHOUT ESOPHAGITIS: ICD-10-CM

## 2020-01-01 DIAGNOSIS — E78.5 HYPERLIPIDEMIA, UNSPECIFIED HYPERLIPIDEMIA TYPE: ICD-10-CM

## 2020-01-01 DIAGNOSIS — N18.6 STAGE 5 CHRONIC KIDNEY DISEASE ON CHRONIC DIALYSIS (HCC): ICD-10-CM

## 2020-01-01 DIAGNOSIS — F41.9 ANXIETY: ICD-10-CM

## 2020-01-01 DIAGNOSIS — Z79.899 HIGH RISK MEDICATION USE: ICD-10-CM

## 2020-01-01 DIAGNOSIS — I10 ESSENTIAL HYPERTENSION: ICD-10-CM

## 2020-01-01 DIAGNOSIS — I10 ESSENTIAL HYPERTENSION: Primary | ICD-10-CM

## 2020-01-01 DIAGNOSIS — Z74.2 NEED FOR HOME HEALTH CARE: ICD-10-CM

## 2020-01-01 DIAGNOSIS — Z99.2 STAGE 5 CHRONIC KIDNEY DISEASE ON CHRONIC DIALYSIS (HCC): Primary | ICD-10-CM

## 2020-01-01 DIAGNOSIS — R50.9 FEVER, UNSPECIFIED FEVER CAUSE: ICD-10-CM

## 2020-01-01 DIAGNOSIS — Z99.2 CKD (CHRONIC KIDNEY DISEASE) REQUIRING CHRONIC DIALYSIS (HCC): ICD-10-CM

## 2020-01-01 DIAGNOSIS — D72.829 LEUKOCYTOSIS, UNSPECIFIED TYPE: ICD-10-CM

## 2020-01-01 DIAGNOSIS — I69.359: ICD-10-CM

## 2020-01-01 DIAGNOSIS — B37.2 YEAST DERMATITIS: ICD-10-CM

## 2020-01-01 DIAGNOSIS — N18.6 CKD (CHRONIC KIDNEY DISEASE) REQUIRING CHRONIC DIALYSIS (HCC): ICD-10-CM

## 2020-01-01 DIAGNOSIS — M47.817 LUMBOSACRAL SPONDYLOSIS WITHOUT MYELOPATHY: Primary | ICD-10-CM

## 2020-01-01 DIAGNOSIS — N18.6 STAGE 5 CHRONIC KIDNEY DISEASE ON CHRONIC DIALYSIS (HCC): Primary | ICD-10-CM

## 2020-01-01 DIAGNOSIS — F05 SUNDOWNING: ICD-10-CM

## 2020-01-01 DIAGNOSIS — E55.9 VITAMIN D DEFICIENCY: ICD-10-CM

## 2020-01-01 DIAGNOSIS — K02.9 DENTAL CARIES: ICD-10-CM

## 2020-01-01 DIAGNOSIS — Z99.2 STAGE 5 CHRONIC KIDNEY DISEASE ON CHRONIC DIALYSIS (HCC): ICD-10-CM

## 2020-01-01 DIAGNOSIS — I49.9 CARDIAC ARRHYTHMIA, UNSPECIFIED CARDIAC ARRHYTHMIA TYPE: ICD-10-CM

## 2020-01-01 DIAGNOSIS — R41.89 COGNITIVE AND BEHAVIORAL CHANGES: Primary | ICD-10-CM

## 2020-01-01 DIAGNOSIS — M47.817 LUMBOSACRAL SPONDYLOSIS WITHOUT MYELOPATHY: ICD-10-CM

## 2020-01-01 DIAGNOSIS — Z99.3 WHEELCHAIR BOUND: ICD-10-CM

## 2020-01-01 DIAGNOSIS — R46.89 COGNITIVE AND BEHAVIORAL CHANGES: Primary | ICD-10-CM

## 2020-01-01 DIAGNOSIS — D50.9 IRON DEFICIENCY ANEMIA, UNSPECIFIED IRON DEFICIENCY ANEMIA TYPE: ICD-10-CM

## 2020-01-01 DIAGNOSIS — D63.8 ANEMIA OF CHRONIC DISEASE: ICD-10-CM

## 2020-01-01 DIAGNOSIS — F51.04 PSYCHOPHYSIOLOGICAL INSOMNIA: ICD-10-CM

## 2020-01-01 DIAGNOSIS — M79.89 SWELLING OF RIGHT UPPER EXTREMITY: Primary | ICD-10-CM

## 2020-01-01 LAB
ABO + RH BLD: NORMAL
ALBUMIN SERPL-MCNC: 2.2 G/DL (ref 3.5–5)
ALBUMIN/GLOB SERPL: 0.4 {RATIO} (ref 1.1–2.2)
ALP SERPL-CCNC: 151 U/L (ref 45–117)
ALT SERPL-CCNC: 57 U/L (ref 12–78)
ANION GAP SERPL CALC-SCNC: 11 MMOL/L (ref 5–15)
ANION GAP SERPL CALC-SCNC: 8 MMOL/L (ref 5–15)
AST SERPL W P-5'-P-CCNC: 70 U/L (ref 15–37)
ATRIAL RATE: 108 BPM
BACTERIA SPEC CULT: ABNORMAL
BASOPHILS # BLD: 0 K/UL (ref 0–0.1)
BASOPHILS NFR BLD: 0 % (ref 0–1)
BENZODIAZEPINES, 791542: NEGATIVE NG/ML
BILIRUB SERPL-MCNC: 0.7 MG/DL (ref 0.2–1)
BLD PROD TYP BPU: NORMAL
BLD PROD TYP BPU: NORMAL
BLOOD BANK CMNT PATIENT-IMP: NORMAL
BLOOD GROUP ANTIBODIES SERPL: NEGATIVE
BPU ID: NORMAL
BPU ID: NORMAL
BUN SERPL-MCNC: 22 MG/DL (ref 6–20)
BUN SERPL-MCNC: 23 MG/DL (ref 6–20)
BUN/CREAT SERPL: 4 (ref 12–20)
BUN/CREAT SERPL: 7 (ref 12–20)
CA-I BLD-MCNC: 7.9 MG/DL (ref 8.5–10.1)
CALCIUM SERPL-MCNC: 7.7 MG/DL (ref 8.5–10.1)
CALCULATED P AXIS, ECG09: 79 DEGREES
CALCULATED R AXIS, ECG10: 68 DEGREES
CALCULATED T AXIS, ECG11: 85 DEGREES
CHLORIDE SERPL-SCNC: 95 MMOL/L (ref 97–108)
CHLORIDE SERPL-SCNC: 97 MMOL/L (ref 97–108)
CO2 SERPL-SCNC: 28 MMOL/L (ref 21–32)
CO2 SERPL-SCNC: 28 MMOL/L (ref 21–32)
COMMENT, HOLDF: NORMAL
COVID-19 RAPID TEST, COVR: NOT DETECTED
CREAT SERPL-MCNC: 3.31 MG/DL (ref 0.55–1.02)
CREAT SERPL-MCNC: 5.88 MG/DL (ref 0.55–1.02)
CROSSMATCH RESULT,%XM: NORMAL
CROSSMATCH RESULT,%XM: NORMAL
DIAGNOSIS, 93000: NORMAL
DIFFERENTIAL METHOD BLD: ABNORMAL
EOSINOPHIL # BLD: 0 K/UL (ref 0–0.4)
EOSINOPHIL NFR BLD: 0 % (ref 0–7)
ERYTHROCYTE [DISTWIDTH] IN BLOOD BY AUTOMATED COUNT: 18.3 % (ref 11.5–14.5)
FLUAV AG NPH QL IA: NEGATIVE
FLUBV AG NOSE QL IA: NEGATIVE
GLOBULIN SER CALC-MCNC: 4.9 G/DL (ref 2–4)
GLUCOSE SERPL-MCNC: 122 MG/DL (ref 65–100)
GLUCOSE SERPL-MCNC: 87 MG/DL (ref 65–100)
HCT VFR BLD AUTO: 29 % (ref 35–47)
HCT VFR BLD AUTO: 35.5 % (ref 35–47)
HGB BLD-MCNC: 10.4 G/DL (ref 11.5–16)
HGB BLD-MCNC: 9.4 G/DL (ref 11.5–16)
IMM GRANULOCYTES # BLD AUTO: 0.1 K/UL (ref 0–0.04)
IMM GRANULOCYTES NFR BLD AUTO: 0 % (ref 0–0.5)
IRON SATN MFR SERPL: 23 % (ref 20–50)
IRON SERPL-MCNC: 47 UG/DL (ref 35–150)
LACTATE SERPL-SCNC: 1.4 MMOL/L (ref 0.4–2)
LYMPHOCYTES # BLD: 1.2 K/UL (ref 0.8–3.5)
LYMPHOCYTES NFR BLD: 7 % (ref 12–49)
MCH RBC QN AUTO: 27.2 PG (ref 26–34)
MCHC RBC AUTO-ENTMCNC: 32.4 G/DL (ref 30–36.5)
MCV RBC AUTO: 83.8 FL (ref 80–99)
MEPERIDINE SERPLBLD-MCNC: NEGATIVE NG/ML
MEPERIDINE SERPLBLD-MCNC: NEGATIVE UG/ML
METHADONE, 791633: NEGATIVE NG/ML
MONOCYTES # BLD: 1.3 K/UL (ref 0–1)
MONOCYTES NFR BLD: 7 % (ref 5–13)
NEUTS SEG # BLD: 15.9 K/UL (ref 1.8–8)
NEUTS SEG NFR BLD: 86 % (ref 32–75)
NRBC # BLD: 0 K/UL (ref 0–0.01)
NRBC BLD-RTO: 0 PER 100 WBC
O-NORTRAMADOL BLD-MCNC: NEGATIVE NG/ML
OPIATES: NEGATIVE NG/ML
OXYCODONES, 738315: NEGATIVE NG/ML
P-R INTERVAL, ECG05: 118 MS
PLATELET # BLD AUTO: 218 K/UL (ref 150–400)
PMV BLD AUTO: 11.9 FL (ref 8.9–12.9)
POTASSIUM SERPL-SCNC: 3.7 MMOL/L (ref 3.5–5.1)
POTASSIUM SERPL-SCNC: 4.5 MMOL/L (ref 3.5–5.1)
PROPOXYPHENE, 791635: NEGATIVE NG/ML
PROT SERPL-MCNC: 7.1 G/DL (ref 6.4–8.2)
Q-T INTERVAL, ECG07: 364 MS
QRS DURATION, ECG06: 64 MS
QTC CALCULATION (BEZET), ECG08: 487 MS
RBC # BLD AUTO: 3.46 M/UL (ref 3.8–5.2)
SAMPLES BEING HELD,HOLD: NORMAL
SARS-COV-2, COV2: NORMAL
SODIUM SERPL-SCNC: 133 MMOL/L (ref 136–145)
SODIUM SERPL-SCNC: 134 MMOL/L (ref 136–145)
SPECIAL REQUESTS,SREQ: ABNORMAL
SPECIAL REQUESTS,SREQ: ABNORMAL
SPECIMEN EXP DATE BLD: NORMAL
SPECIMEN SOURCE: NORMAL
STATUS OF UNIT,%ST: NORMAL
STATUS OF UNIT,%ST: NORMAL
THC (MARIJUANA) METABOLITE, 761546: NEGATIVE NG/ML
TIBC SERPL-MCNC: 206 UG/DL (ref 250–450)
TRAMADOL BLD-MCNC: NEGATIVE NG/ML
TRANSFUSION STATUS PATIENT QL: NORMAL
TRANSFUSION STATUS PATIENT QL: NORMAL
TSH SERPL DL<=0.05 MIU/L-ACNC: 1.53 UIU/ML (ref 0.36–3.74)
UNIT DIVISION, %UDIV: 0
UNIT DIVISION, %UDIV: 0
VENTRICULAR RATE, ECG03: 108 BPM
WBC # BLD AUTO: 18.5 K/UL (ref 3.6–11)

## 2020-01-01 PROCEDURE — G8400 PT W/DXA NO RESULTS DOC: HCPCS | Performed by: FAMILY MEDICINE

## 2020-01-01 PROCEDURE — 70450 CT HEAD/BRAIN W/O DYE: CPT

## 2020-01-01 PROCEDURE — G8428 CUR MEDS NOT DOCUMENT: HCPCS | Performed by: FAMILY MEDICINE

## 2020-01-01 PROCEDURE — 86923 COMPATIBILITY TEST ELECTRIC: CPT

## 2020-01-01 PROCEDURE — 74011250636 HC RX REV CODE- 250/636: Performed by: INTERNAL MEDICINE

## 2020-01-01 PROCEDURE — 3017F COLORECTAL CA SCREEN DOC REV: CPT | Performed by: FAMILY MEDICINE

## 2020-01-01 PROCEDURE — 74011250636 HC RX REV CODE- 250/636: Performed by: EMERGENCY MEDICINE

## 2020-01-01 PROCEDURE — 99214 OFFICE O/P EST MOD 30 MIN: CPT | Performed by: FAMILY MEDICINE

## 2020-01-01 PROCEDURE — 85025 COMPLETE CBC W/AUTO DIFF WBC: CPT

## 2020-01-01 PROCEDURE — 73060 X-RAY EXAM OF HUMERUS: CPT

## 2020-01-01 PROCEDURE — 3017F COLORECTAL CA SCREEN DOC REV: CPT | Performed by: NURSE PRACTITIONER

## 2020-01-01 PROCEDURE — 87077 CULTURE AEROBIC IDENTIFY: CPT

## 2020-01-01 PROCEDURE — 87635 SARS-COV-2 COVID-19 AMP PRB: CPT

## 2020-01-01 PROCEDURE — 93005 ELECTROCARDIOGRAM TRACING: CPT

## 2020-01-01 PROCEDURE — 1101F PT FALLS ASSESS-DOCD LE1/YR: CPT | Performed by: FAMILY MEDICINE

## 2020-01-01 PROCEDURE — 72125 CT NECK SPINE W/O DYE: CPT

## 2020-01-01 PROCEDURE — 74011250637 HC RX REV CODE- 250/637: Performed by: EMERGENCY MEDICINE

## 2020-01-01 PROCEDURE — 36415 COLL VENOUS BLD VENIPUNCTURE: CPT

## 2020-01-01 PROCEDURE — 1101F PT FALLS ASSESS-DOCD LE1/YR: CPT | Performed by: NURSE PRACTITIONER

## 2020-01-01 PROCEDURE — G9717 DOC PT DX DEP/BP F/U NT REQ: HCPCS | Performed by: FAMILY MEDICINE

## 2020-01-01 PROCEDURE — 87186 SC STD MICRODIL/AGAR DIL: CPT

## 2020-01-01 PROCEDURE — 87040 BLOOD CULTURE FOR BACTERIA: CPT

## 2020-01-01 PROCEDURE — 86900 BLOOD TYPING SEROLOGIC ABO: CPT

## 2020-01-01 PROCEDURE — G9231 DOC ESRD DIA TRANS PREG: HCPCS | Performed by: NURSE PRACTITIONER

## 2020-01-01 PROCEDURE — 99284 EMERGENCY DEPT VISIT MOD MDM: CPT

## 2020-01-01 PROCEDURE — 1090F PRES/ABSN URINE INCON ASSESS: CPT | Performed by: FAMILY MEDICINE

## 2020-01-01 PROCEDURE — G9231 DOC ESRD DIA TRANS PREG: HCPCS | Performed by: FAMILY MEDICINE

## 2020-01-01 PROCEDURE — 90935 HEMODIALYSIS ONE EVALUATION: CPT

## 2020-01-01 PROCEDURE — 99283 EMERGENCY DEPT VISIT LOW MDM: CPT

## 2020-01-01 PROCEDURE — 71045 X-RAY EXAM CHEST 1 VIEW: CPT

## 2020-01-01 PROCEDURE — G8400 PT W/DXA NO RESULTS DOC: HCPCS | Performed by: NURSE PRACTITIONER

## 2020-01-01 PROCEDURE — P9016 RBC LEUKOCYTES REDUCED: HCPCS

## 2020-01-01 PROCEDURE — 1090F PRES/ABSN URINE INCON ASSESS: CPT | Performed by: NURSE PRACTITIONER

## 2020-01-01 PROCEDURE — 80053 COMPREHEN METABOLIC PANEL: CPT

## 2020-01-01 PROCEDURE — 87804 INFLUENZA ASSAY W/OPTIC: CPT

## 2020-01-01 PROCEDURE — 99214 OFFICE O/P EST MOD 30 MIN: CPT | Performed by: NURSE PRACTITIONER

## 2020-01-01 PROCEDURE — 83605 ASSAY OF LACTIC ACID: CPT

## 2020-01-01 PROCEDURE — G8428 CUR MEDS NOT DOCUMENT: HCPCS | Performed by: NURSE PRACTITIONER

## 2020-01-01 PROCEDURE — G9717 DOC PT DX DEP/BP F/U NT REQ: HCPCS | Performed by: NURSE PRACTITIONER

## 2020-01-01 RX ORDER — PANTOPRAZOLE SODIUM 40 MG/1
40 GRANULE, DELAYED RELEASE ORAL DAILY
COMMUNITY
End: 2020-01-01 | Stop reason: SDUPTHER

## 2020-01-01 RX ORDER — ATORVASTATIN CALCIUM 40 MG/1
TABLET, FILM COATED ORAL DAILY
COMMUNITY
End: 2020-01-01 | Stop reason: SDUPTHER

## 2020-01-01 RX ORDER — CLOPIDOGREL BISULFATE 75 MG/1
TABLET ORAL
COMMUNITY
End: 2020-01-01 | Stop reason: SDUPTHER

## 2020-01-01 RX ORDER — CLOPIDOGREL BISULFATE 75 MG/1
TABLET ORAL
Qty: 90 TAB | Refills: 0 | Status: SHIPPED | OUTPATIENT
Start: 2020-01-01

## 2020-01-01 RX ORDER — LORAZEPAM 0.5 MG/1
TABLET ORAL
COMMUNITY
End: 2020-01-01 | Stop reason: SDUPTHER

## 2020-01-01 RX ORDER — METOPROLOL TARTRATE 50 MG/1
TABLET ORAL
Qty: 180 TAB | Refills: 0 | Status: SHIPPED | OUTPATIENT
Start: 2020-01-01

## 2020-01-01 RX ORDER — ATORVASTATIN CALCIUM 40 MG/1
40 TABLET, FILM COATED ORAL DAILY
Qty: 90 TAB | Refills: 1 | Status: SHIPPED | OUTPATIENT
Start: 2020-01-01 | End: 2020-01-01

## 2020-01-01 RX ORDER — AMIODARONE HYDROCHLORIDE 200 MG/1
200 TABLET ORAL 2 TIMES DAILY
Qty: 180 TAB | Refills: 0 | Status: SHIPPED | OUTPATIENT
Start: 2020-01-01 | End: 2020-01-01

## 2020-01-01 RX ORDER — ONDANSETRON 4 MG/1
TABLET, FILM COATED ORAL
Qty: 30 TAB | Refills: 1 | Status: SHIPPED | OUTPATIENT
Start: 2020-01-01 | End: 2020-01-01 | Stop reason: SDUPTHER

## 2020-01-01 RX ORDER — LANOLIN ALCOHOL/MO/W.PET/CERES
CREAM (GRAM) TOPICAL 2 TIMES DAILY
COMMUNITY
End: 2020-01-01 | Stop reason: SDUPTHER

## 2020-01-01 RX ORDER — CLOPIDOGREL BISULFATE 75 MG/1
75 TABLET ORAL DAILY
Qty: 90 TAB | Refills: 1 | Status: SHIPPED | OUTPATIENT
Start: 2020-01-01 | End: 2020-01-01

## 2020-01-01 RX ORDER — ONDANSETRON 4 MG/1
4 TABLET, FILM COATED ORAL
Qty: 30 TAB | Refills: 1 | Status: SHIPPED | OUTPATIENT
Start: 2020-01-01 | End: 2020-01-01

## 2020-01-01 RX ORDER — SODIUM CHLORIDE 9 MG/ML
250 INJECTION, SOLUTION INTRAVENOUS AS NEEDED
Status: DISCONTINUED | OUTPATIENT
Start: 2020-01-01 | End: 2020-01-01 | Stop reason: HOSPADM

## 2020-01-01 RX ORDER — ONDANSETRON 4 MG/1
TABLET, FILM COATED ORAL
Qty: 30 TAB | Refills: 0 | Status: SHIPPED | OUTPATIENT
Start: 2020-01-01 | End: 2020-01-01 | Stop reason: SDUPTHER

## 2020-01-01 RX ORDER — AZITHROMYCIN 250 MG/1
TABLET, FILM COATED ORAL
Qty: 6 TAB | Refills: 0 | Status: SHIPPED | OUTPATIENT
Start: 2020-01-01 | End: 2020-01-01

## 2020-01-01 RX ORDER — METOPROLOL TARTRATE 50 MG/1
50 TABLET ORAL 2 TIMES DAILY
Qty: 180 TAB | Refills: 1 | Status: SHIPPED | OUTPATIENT
Start: 2020-01-01 | End: 2020-01-01

## 2020-01-01 RX ORDER — PANTOPRAZOLE SODIUM 40 MG/1
40 GRANULE, DELAYED RELEASE ORAL DAILY
Qty: 90 EACH | Refills: 1 | Status: SHIPPED | OUTPATIENT
Start: 2020-01-01

## 2020-01-01 RX ORDER — L. ACIDOPHILUS/L.BULGARICUS 100MM CELL
1 GRANULES IN PACKET (EA) ORAL 2 TIMES DAILY
COMMUNITY
End: 2020-01-01 | Stop reason: SDUPTHER

## 2020-01-01 RX ORDER — NYSTATIN 100000 [USP'U]/G
POWDER TOPICAL 4 TIMES DAILY
Qty: 2 BOTTLE | Refills: 1 | Status: SHIPPED | OUTPATIENT
Start: 2020-01-01

## 2020-01-01 RX ORDER — L. ACIDOPHILUS/L.BULGARICUS 100MM CELL
1 GRANULES IN PACKET (EA) ORAL 2 TIMES DAILY
Qty: 12 PACKET | Refills: 2 | Status: SHIPPED | OUTPATIENT
Start: 2020-01-01

## 2020-01-01 RX ORDER — TRAZODONE HYDROCHLORIDE 50 MG/1
TABLET ORAL
Qty: 90 TAB | Refills: 1 | Status: SHIPPED | OUTPATIENT
Start: 2020-01-01 | End: 2020-01-01 | Stop reason: SDUPTHER

## 2020-01-01 RX ORDER — AMIODARONE HYDROCHLORIDE 200 MG/1
TABLET ORAL
Qty: 180 TAB | Refills: 0 | Status: SHIPPED | OUTPATIENT
Start: 2020-01-01

## 2020-01-01 RX ORDER — POTASSIUM CHLORIDE 40 MEQ/15ML
40 SOLUTION ORAL DAILY
Qty: 480 ML | Refills: 1 | Status: SHIPPED | OUTPATIENT
Start: 2020-01-01 | End: 2020-01-01

## 2020-01-01 RX ORDER — LORAZEPAM 0.5 MG/1
0.5 TABLET ORAL
Qty: 30 TAB | Refills: 2 | Status: SHIPPED | OUTPATIENT
Start: 2020-01-01 | End: 2020-01-01 | Stop reason: SDUPTHER

## 2020-01-01 RX ORDER — ONDANSETRON 4 MG/1
4 TABLET, FILM COATED ORAL
COMMUNITY
End: 2020-01-01 | Stop reason: SDUPTHER

## 2020-01-01 RX ORDER — ONDANSETRON 4 MG/1
4 TABLET, FILM COATED ORAL
Qty: 30 TAB | Refills: 1 | Status: SHIPPED | OUTPATIENT
Start: 2020-01-01

## 2020-01-01 RX ORDER — LANOLIN ALCOHOL/MO/W.PET/CERES
325 CREAM (GRAM) TOPICAL 2 TIMES DAILY
Qty: 180 TAB | Refills: 1 | Status: SHIPPED | OUTPATIENT
Start: 2020-01-01

## 2020-01-01 RX ORDER — TRAZODONE HYDROCHLORIDE 50 MG/1
TABLET ORAL
Qty: 90 TAB | Refills: 1 | Status: SHIPPED | OUTPATIENT
Start: 2020-01-01

## 2020-01-01 RX ORDER — HYDROXYZINE 25 MG/1
TABLET, FILM COATED ORAL
COMMUNITY
End: 2020-01-01

## 2020-01-01 RX ORDER — AMIODARONE HYDROCHLORIDE 200 MG/1
200 TABLET ORAL 2 TIMES DAILY
COMMUNITY
End: 2020-01-01 | Stop reason: SDUPTHER

## 2020-01-01 RX ORDER — ATORVASTATIN CALCIUM 40 MG/1
TABLET, FILM COATED ORAL
Qty: 90 TAB | Refills: 0 | Status: SHIPPED | OUTPATIENT
Start: 2020-01-01

## 2020-01-01 RX ORDER — HEPARIN SODIUM 1000 [USP'U]/ML
3700 INJECTION, SOLUTION INTRAVENOUS; SUBCUTANEOUS
Status: DISCONTINUED | OUTPATIENT
Start: 2020-01-01 | End: 2020-01-01 | Stop reason: HOSPADM

## 2020-01-01 RX ORDER — AMIODARONE HYDROCHLORIDE 200 MG/1
TABLET ORAL
Qty: 180 TAB | Refills: 0 | Status: SHIPPED | OUTPATIENT
Start: 2020-01-01 | End: 2020-01-01

## 2020-01-01 RX ORDER — ASPIRIN 325 MG
TABLET, DELAYED RELEASE (ENTERIC COATED) ORAL
Qty: 27 CAP | Refills: 2 | Status: SHIPPED | OUTPATIENT
Start: 2020-01-01

## 2020-01-01 RX ORDER — LORAZEPAM 0.5 MG/1
0.5 TABLET ORAL
Qty: 30 TAB | Refills: 0 | Status: SHIPPED | OUTPATIENT
Start: 2020-01-01

## 2020-01-01 RX ORDER — SODIUM CHLORIDE 0.9 % (FLUSH) 0.9 %
5-10 SYRINGE (ML) INJECTION AS NEEDED
Status: DISCONTINUED | OUTPATIENT
Start: 2020-01-01 | End: 2020-01-01 | Stop reason: HOSPADM

## 2020-01-01 RX ORDER — LACTOSE-REDUCED FOOD 0.05 G-1.5
4 LIQUID (ML) ORAL
Qty: 600 CAN | Refills: 5 | Status: SHIPPED | OUTPATIENT
Start: 2020-01-01

## 2020-01-01 RX ADMIN — HEPARIN SODIUM 3700 UNITS: 1000 INJECTION, SOLUTION INTRAVENOUS; SUBCUTANEOUS at 11:27

## 2020-01-01 RX ADMIN — SODIUM CHLORIDE 1000 ML: 9 INJECTION, SOLUTION INTRAVENOUS at 21:39

## 2020-01-01 RX ADMIN — ACETAMINOPHEN 650 MG: 650 SOLUTION ORAL at 20:50

## 2020-02-20 NOTE — TELEPHONE ENCOUNTER
Kiley from [address] received paperwork for the incontinence supply's, but it was missing the date of service and its was faxed back on 2/13/2020 to complete.

## 2020-03-19 NOTE — TELEPHONE ENCOUNTER
Pt daughter called looking for a form from [address] [employer] to be filled out for pt due to medical issues.      [employer] approved a fax through for document and daughter states that it will be faxed on 3/20/2020 from her place of work ([facility]'s office)

## 2020-03-23 NOTE — TELEPHONE ENCOUNTER
----- Message from Alfreda Castañeda sent at 3/23/2020 12:17 PM EDT -----  Regarding: Dr. Reinoso Kelby: [phone]  Caller's first and last name: Madiha Danay pt daughter  Reason for call: Pt daughter would like to know if the [employer] paper has been received.   Callback required yes/no and why: y  Best contact number(s): [phone]  Details to clarify the request: n/a

## 2020-05-07 NOTE — PROGRESS NOTES
Anna Godfrey is a 71 y.o. female who was seen by synchronous (real-time) audio-video technology on 5/7/2020. Consent: Anna Godfrey, who was seen by synchronous (real-time) audio-video technology, and/or her healthcare decision maker, is aware that this patient-initiated, Telehealth encounter on 5/7/2020 is a billable service, with coverage as determined by her insurance carrier. She is aware that she may receive a bill and has provided verbal consent to proceed: Yes. Assessment & Plan:  
Diagnoses and all orders for this visit: 1. Essential hypertension 
-     amiodarone (CORDARONE) 200 mg tablet; Take 1 Tab by mouth two (2) times a day. -     metoprolol tartrate (LOPRESSOR) 50 mg tablet; Take 1 Tab by mouth two (2) times a day. - Presumed stable, daughter states amiodarone started in recent hospital stay, she is unsure why, the bottle states hypertension, she needs a referral to Cardiology. BP stable at home. Refills today. 2. Need for home health care 
-     [address] [place] [address] 
- Referral in for evaluation and assistance with ADL 3. Hyperlipidemia, unspecified hyperlipidemia type 
-     atorvastatin (LIPITOR) 40 mg tablet; Take 1 Tab by mouth daily. 
- Presumed stable, refills today 4. Vitamin D deficiency 
-     cholecalciferol (VITAMIN D3) (50,000 UNITS /1250 MCG) capsule; Take 1 capsule every Monday and Friday - Presumed stable, refill today 5. Sundowning 
-     traZODone (DESYREL) 50 mg tablet; TAKE 1/2 TAB PER G TUBE AT BEDTIME 
- Unchanged, refill today 6. Nausea 
-     ondansetron hcl (Zofran) 4 mg tablet; Take 1 Tab by mouth every six (6) hours as needed for Nausea or Nausea or Vomiting. 
- Stable, refill today 7. Gastroesophageal reflux disease without esophagitis -     pantoprazole (Protonix) 40 mg granules for oral suspension; Take 40 mg by mouth daily. 
- Stable, refill today 8. Anxiety -     LORazepam (ATIVAN) 0.5 mg tablet; Take 1 Tab by mouth every four (4) hours as needed for Anxiety. Max Daily Amount: 3 mg. 
- Unchanged, refill today 9. Iron deficiency 
-     ferrous sulfate 325 mg (65 mg iron) tablet; Take 1 Tab by mouth two (2) times a day. - Presumed stable, refill today. Will need updated labwork when COVID-19 is over 10. Stage 5 chronic kidney disease on chronic dialysis ([facility] [address] -managed by nephrology 11. History of stroke 
-     clopidogreL (PLAVIX) 75 mg tab; Take 1 Tab by mouth daily. 
- Stable, continue current therapy 13. Cardiac arrhythmia, unspecified cardiac arrhythmia type 
-     REFERRAL TO CARDIOLOGY 
- No records from hospital stay, patient came out of the hospital on amiodorone, ref to cardiology, will work to get records Other orders -     potassium chloride (KAON 20%) 40 mEq/15 mL liqd; Take 15 mL by mouth daily. 40 MEQ/5 ML I spent at least 35 minutes on this visit with this established patient. Subjective:  
Gurjit Medina is a 71 y.o. female who was seen for Medication Refill (All medications) and New Order (PT     Was dropped out of wheelchair-02/17/20) VV today for referral to PT and OT. Patient has history stroke in 2013. This past Feb 2020 was dropped out of wheelchair during transportation. Patient was sent to [facility] and then inpatient rehab. Daughter states she was discharge on numerous medications and she is unsure why. Daughter states she was seen by cardiology in the past and needs a referral to cardiology. [facility]\" is currently coming to home, [phone]. Prior to Admission medications Medication Sig Start Date End Date Taking? Authorizing Provider  
lactobacillus-acidophilus (LACTINEX) 100 million cell grpk Take 1 Packet by mouth two (2) times a day.    Yes Provider, Historical  
amiodarone (CORDARONE) 200 mg tablet Take 1 Tab by mouth two (2) times a day. 5/7/20  Yes Chica Montague NP  
atorvastatin (LIPITOR) 40 mg tablet Take 1 Tab by mouth daily. 5/7/20  Yes Chica Montague NP  
cholecalciferol (VITAMIN D3) (50,000 UNITS /1250 MCG) capsule Take 1 capsule every Monday and Friday 5/7/20  Yes Chica Montague NP  
clopidogreL (PLAVIX) 75 mg tab Take 1 Tab by mouth daily. 5/7/20  Yes Chica Montague NP  
ferrous sulfate 325 mg (65 mg iron) tablet Take 1 Tab by mouth two (2) times a day. 5/7/20  Yes Chica Montague NP  
LORazepam (ATIVAN) 0.5 mg tablet Take 1 Tab by mouth every four (4) hours as needed for Anxiety. Max Daily Amount: 3 mg. 5/7/20  Yes Chica Montague NP  
metoprolol tartrate (LOPRESSOR) 50 mg tablet Take 1 Tab by mouth two (2) times a day. 5/7/20  Yes Chica Montague NP  
ondansetron hcl (Zofran) 4 mg tablet Take 1 Tab by mouth every six (6) hours as needed for Nausea or Nausea or Vomiting. 5/7/20  Yes Chica Montague NP  
pantoprazole (Protonix) 40 mg granules for oral suspension Take 40 mg by mouth daily. 5/7/20  Yes Chica Montague NP  
potassium chloride (KAON 20%) 40 mEq/15 mL liqd Take 15 mL by mouth daily. 40 MEQ/5 ML 5/7/20  Yes Chica Montague NP  
traZODone (DESYREL) 50 mg tablet TAKE 1/2 TAB PER G TUBE AT BEDTIME 5/7/20  Yes Chica Montague NP  
docusate sodium (COLACE) 100 mg capsule Take 100 mg by mouth two (2) times a day. Yes Provider, Historical  
NUT. TX.IMPAIRED RENAL FXN,SOY (NEPRO PO) Take  by mouth. 1.5 CANS VIA FEEDING TUBE EVERY 4 HOURS PER DAUGHTER   Yes Provider, Historical  
aspirin 81 mg chewable tablet Take 81 mg by mouth daily. Yes Provider, Historical  
amiodarone (CORDARONE) 200 mg tablet Take 200 mg by mouth two (2) times a day. 5/7/20  Provider, Historical  
atorvastatin (LIPITOR) 40 mg tablet Take  by mouth daily. 5/7/20  Provider, Historical  
clopidogreL (PLAVIX) 75 mg tab Take  by mouth.   5/7/20  Provider, Historical  
 hydrOXYzine HCL (ATARAX) 25 mg tablet Take  by mouth three (3) times daily as needed. 5/7/20  Provider, Historical  
LORazepam (ATIVAN) 0.5 mg tablet Take  by mouth. 5/7/20  Provider, Historical  
pantoprazole (Protonix) 40 mg granules for oral suspension 40 mg daily. 5/7/20  Provider, Historical  
ondansetron hcl (Zofran) 4 mg tablet Take 4 mg by mouth every six (6) hours as needed for Nausea or Nausea or Vomiting. 5/7/20  Provider, Historical  
ferrous sulfate 325 mg (65 mg iron) tablet Take  by mouth two (2) times a day. 5/7/20  Provider, Historical  
metoprolol tartrate (LOPRESSOR) 50 mg tablet Take 1 Tab by mouth two (2) times a day. 8/14/19 5/7/20  Ryan Whitehead MD  
traZODone (DESYREL) 50 mg tablet TAKE 1/2 TAB PER G TUBE AT BEDTIME 8/14/19 5/7/20  Ryan Whitehead MD  
metoclopramide HCl (REGLAN) 5 mg tablet Take 5 mg by mouth Before breakfast, lunch, and dinner. 5/7/20  Provider, Historical  
DARBEPOETIN JORDAN IN POLYSORBAT INJECTION by Injection route. 5/7/20  Provider, Historical  
metoclopramide HCl (REGLAN) 5 mg tablet Take 5 mg by mouth four (4) times daily. 5/7/20  Provider, Historical  
POTASSIUM CHLORIDE PO 40 mEq by Feeding Tube route daily. 40 MEQ/5 ML  5/7/20  Provider, Historical  
CHOLECALCIFEROL, VITAMIN D3, (VITAMIN D3 PO) Take  by mouth Every Mon, Wed & Sun.  5/7/20  Provider, Historical  
 
No Known Allergies Patient Active Problem List  
Diagnosis Code  Vascular dementia with depressed mood (HCC) F01.51, F32.9  
 Urinary incontinence R32  
 HTN (hypertension) I10  
 Insomnia G47.00  Chronic kidney disease N18.9  Hypertension I10  
 Impaired oral feeding R63.3 Review of Systems Constitutional: Negative for chills, fever and malaise/fatigue. Wheelchair bound Eyes: Negative for blurred vision. Respiratory: Negative for cough and shortness of breath. Cardiovascular: Negative for chest pain, palpitations and leg swelling. Neurological: Negative for dizziness and headaches. Objective:  
Vital Signs: (As obtained by patient/caregiver at home) There were no vitals taken for this visit. [INSTRUCTIONS:  \"[x]\" Indicates a positive item  \"[]\" Indicates a negative item  -- DELETE ALL ITEMS NOT EXAMINED] Constitutional: [x] Appears well-developed and well-nourished [x] No apparent distress   
  [] Abnormal - Mental status: [x] Alert and awake  [x] Oriented to person/place/time [x] Able to follow commands   
[] Abnormal - Eyes:   EOM    [x]  Normal    [] Abnormal -  
Sclera  [x]  Normal    [] Abnormal - 
        Discharge [x]  None visible   [] Abnormal - HENT: [x] Normocephalic, atraumatic  [] Abnormal -  
[x] Mouth/Throat: Mucous membranes are moist 
 
External Ears [x] Normal  [] Abnormal - Neck: [x] No visualized mass [] Abnormal - Pulmonary/Chest: [x] Respiratory effort normal   [x] No visualized signs of difficulty breathing or respiratory distress 
      [] Abnormal - Musculoskeletal:   [x] Normal gait with no signs of ataxia [x] Normal range of motion of neck [] Abnormal -  
 
Neurological:        [x] No Facial Asymmetry (Cranial nerve 7 motor function) (limited exam due to video visit) [x] No gaze palsy  
     [] Abnormal -   
     
Skin:        [x] No significant exanthematous lesions or discoloration noted on facial skin   
     [] Abnormal - Psychiatric:       [x] Normal Affect [] Abnormal -  
     [x] No Hallucinations Other pertinent observable physical exam findings:- 
 
 
 
We discussed the expected course, resolution and complications of the diagnosis(es) in detail. Medication risks, benefits, costs, interactions, and alternatives were discussed as indicated. I advised her to contact the office if her condition worsens, changes or fails to improve as anticipated. She expressed understanding with the diagnosis(es) and plan. Марина Sykes is a 71 y.o. female who was evaluated by a video visit encounter for concerns as above. Patient identification was verified prior to start of the visit. A caregiver was present when appropriate. Due to this being a TeleHealth encounter ([facility]), evaluation of the following organ systems was limited: Vitals/Constitutional/EENT/Resp/CV/GI//MS/Neuro/Skin/Heme-Lymph-Imm. Pursuant to the emergency declaration under the [address] [place] [address], [facility] waiver authority and the [employer] Act, this Virtual  Visit was conducted, with patient's (and/or legal guardian's) consent, to reduce the patient's risk of exposure to COVID-19 and provide necessary medical care. Services were provided through a video synchronous discussion virtually to substitute for in-person clinic visit. Patient and provider were located at their individual homes.  
 
 
Arabella Lazaro NP

## 2020-05-07 NOTE — PROGRESS NOTES
Patient's Daughter stated name &  Chief Complaint Patient presents with  Medication Refill All medications  New Order PT     Was dropped out of wheelchair-20 Health Maintenance Due Topic  Hepatitis C Screening  DTaP/Tdap/Td series (1 - Tdap)  Lipid Screen  Shingrix Vaccine Age 50> (1 of 2)  Breast Cancer Screen Mammogram   
 FOBT Q1Y Age 54-65  Medicare Yearly Exam   
 GLAUCOMA SCREENING Q2Y  Bone Densitometry (Dexa) Screening  Pneumococcal 65+ years (1 of 1 - PPSV23) Wt Readings from Last 3 Encounters:  
19 109 lb (49.4 kg) 19 130 lb (59 kg) 18 103 lb (46.7 kg) Temp Readings from Last 3 Encounters:  
19 98.6 °F (37 °C) (Oral) 19 98.8 °F (37.1 °C)  
19 98.5 °F (36.9 °C) (Oral) BP Readings from Last 3 Encounters:  
19 127/78  
19 149/72  
19 (!) 174/92 Pulse Readings from Last 3 Encounters:  
19 68  
19 74  
19 84 Learning Assessment: 
:  
 
No flowsheet data found. Depression Screening: 
:  
 
No flowsheet data found. Fall Risk Assessment: 
:  
 
No flowsheet data found. Abuse Screening: 
:  
 
No flowsheet data found. Coordination of Care Questionnaire: 
:  
 
1) Have you been to an emergency room, urgent care clinic since your last visit? No 
Hospitalized since your last visit? Yes d/c Nursing facility  2) Have you seen or consulted any other health care providers outside of [address] [place] [address] since your last visit? No  (Include any pap smears or colon screenings in this section.) Patient is accompanied by virtual visit I have received verbal consent from Elfego Shirley to discuss any/all medical information while they are present in the room.

## 2020-05-12 NOTE — TELEPHONE ENCOUNTER
----- Message from Maida Zamora sent at 5/12/2020  8:20 AM EDT -----  Regarding: NP Montague/telephone  Pt's daughter Jose Antonio Montano would like to speak to the doctor, has a few questions regarding mom medication on Floranex , Potassium Chloride and Protonix, please call daughter at [phone]

## 2020-05-12 NOTE — TELEPHONE ENCOUNTER
Danisha Sofia, from [place] called about MsInocencia Chauhan  Was discharged to home last week from hospital.  You saw her last week. Her health is declining since H/O admission. She now needs total care and is very lethargic. Her weight has gone from 136lbs to 98 lbs  Daughter, feels like medicines are killing her. Was on 2-3 meds now on 14  Some are Trazadone, Lorazepam, Metoprolol & Amlodipine. Daughter is holding her meds until she talks with you. Has been vomiting, Nausea meds not working  Body odor is extreme, does bathed her. Requesting an order for a wrist BP cuff, arm cuff is uncomfortable. Benja Montiel   Daughter, Deja Pelaez [phone]  Also, would like to fill out a DNR form. Is also, requesting  Hospice.

## 2020-05-12 NOTE — TELEPHONE ENCOUNTER
Call with Jhonny Cruz with home health and medicaid care coordinator  Had a fall during transportation, was then sent to dialysis, was sent to [facility], was then sent to [address] [place] [address] home was then sent back to hospital because was fed orally and nursing home and aspirated, after [facility] was sent to  [facility] and [facility]  Daughter wants DNR and hospice

## 2020-05-12 NOTE — TELEPHONE ENCOUNTER
Called Di Adame, listed on pts HIPAA, to get clarifying information. She would like to know what the potassium is for and why the floranex was not filled. I am not seeing this listed as one of the pts medications. Is this something we have prescribed before?

## 2020-05-20 NOTE — TELEPHONE ENCOUNTER
Pts daughter, Esau Smith, called stating pt needs incontinence supplies and 'her milk.' I tried to get clarification but she seemed very confused and not exactly sure what pt needs.      Vilma Jones- [phone]

## 2020-05-21 NOTE — TELEPHONE ENCOUNTER
Returned daughters phone call and explained we need paperwork to sign for the Nepro PO and the incontinence pads/depends. Daughter states she will call [facility] and get the information sent over.

## 2020-05-22 NOTE — TELEPHONE ENCOUNTER
Nehemias Adamson,  Can you take out [place] & put in [facility] [employer] as her main insurance. ID [id number]  No group number  This is per [employer]. Trying to do a prior authorization.   Thank you,  gildardo

## 2020-05-22 NOTE — TELEPHONE ENCOUNTER
Called daughter no answer and unable to leave VM, we have not received any fax on this- will follow up next week

## 2020-05-28 NOTE — TELEPHONE ENCOUNTER
----- Message from Amada Hadley sent at 5/28/2020  8:24 AM EDT -----  Regarding: DR. Quiroz/Telephone  General Message/Vendor Calls    Caller's first and last name: Soren Harris ([facility])      Reason for call: Requesting an order for a hospital bed mattress and a tub transfer bench and a three in one commode. Stated they all are for to improve safely when transferring pt.  Order need to be sent to [facility] (I)[phone]      Callback required yes/no and why:Yes      Best contact number(s):[phone]      Details to clarify the request:      Amada Hadley

## 2020-05-28 NOTE — TELEPHONE ENCOUNTER
NP Montague attempted to send in order but they were unable to accept without office visit notes containing specific information. Fax stating what they need scanned into pt chart under '[employer] Medicare Guidelines'.  NP Montague would prefer pt see MD. Scheduled appt with Dr Claudine Alvarado for 6/3/20

## 2020-05-29 NOTE — TELEPHONE ENCOUNTER
Gina Garciaint detailed written order scanned into pts chart as well.  Needs to discuss at appt or additional appt if needed

## 2020-06-09 NOTE — TELEPHONE ENCOUNTER
----- Message from Wagner Sanchez sent at 6/9/2020  9:57 AM EDT -----  Regarding: Dr. Adina Escobar: [phone]  Caller's first and last name: Vaishnavi/Luist/Representative  Reason for call: Representative sent over written order for pt. to have several splints. Need Dr. Ricky Reinoso to sign off on written order. Callback required yes/no and why: Yes, to confirm.   Best contact number(s): [phone]  Details to clarify the request: N/A

## 2020-06-17 PROBLEM — D50.9 IRON DEFICIENCY ANEMIA: Status: ACTIVE | Noted: 2020-01-01

## 2020-06-17 PROBLEM — M47.817 LUMBOSACRAL SPONDYLOSIS WITHOUT MYELOPATHY: Status: ACTIVE | Noted: 2020-01-01

## 2020-06-17 PROBLEM — I69.359: Status: ACTIVE | Noted: 2020-01-01

## 2020-06-17 PROBLEM — D63.8 ANEMIA OF CHRONIC DISEASE: Status: ACTIVE | Noted: 2020-01-01

## 2020-06-17 PROBLEM — Z93.1 GASTROSTOMY TUBE DEPENDENT (HCC): Status: ACTIVE | Noted: 2020-01-01

## 2020-06-17 NOTE — PROGRESS NOTES
Identified pt with two pt identifiers(name and ). Reviewed record in preparation for visit and have obtained necessary documentation. Chief Complaint Patient presents with  Medication Evaluation Discuss medication concerns Health Maintenance Due Topic  Hepatitis C Screening  Lipid Screen  DTaP/Tdap/Td series (1 - Tdap)  Shingrix Vaccine Age 50> (1 of 2)  Breast Cancer Screen Mammogram   
 FOBT Q1Y Age 54-65  Medicare Yearly Exam   
 GLAUCOMA SCREENING Q2Y  Bone Densitometry (Dexa) Screening  Pneumococcal 65+ years (1 of 1 - PPSV23) Coordination of Care Questionnaire: 
:  
1) Have you been to an emergency room, urgent care, or hospitalized since your last visit? If yes, where when, and reason for visit? Yes, [facility] for blood transfusion 2. Have seen or consulted any other health care provider since your last visit? If yes, where when, and reason for visit? Yes, Dialysis Patient is accompanied by daughter I have received verbal consent from Mike Colón to discuss any/all medical information while they are present in the room.

## 2020-06-17 NOTE — PROGRESS NOTES
Jose Cameron 71 y.o. female 1955 [id number] Progress Note Encounter Date: 6/17/2020 Assessment and Plan:  
 
Encounter Diagnoses ICD-10-CM ICD-9-CM 1. Lumbosacral spondylosis without myelopathy M47.817 721.3 2. Cardiac arrhythmia, unspecified cardiac arrhythmia type I49.9 427.9 3. CKD (chronic kidney disease) requiring chronic dialysis (HCC) N18.6 585.6 Z99.2 V45.11  
4. Anemia of chronic disease D63.8 285.29  
5. High risk medication use Z79.899 V58.69  
6. Iron deficiency anemia, unspecified iron deficiency anemia type D50.9 280.9 7. History of stroke Z86.73 V12.54  
8. Vascular dementia with depressed mood ([facility] F01.51 290.43 F32.9   
9. Yeast dermatitis B37.2 112.3 10. Dysphagia, unspecified type R13.10 787.20  
11. Gastrostomy tube dependent ([facility] Z93.1 V44.1 1. Lumbosacral spondylosis without myelopathy 12. Hemiplegia following CVA Followed by interventional pain and spine. Patient requires frequent positional changes that are not possible given a ordinary bed due to her hemiplegia as well as tub transfer bench to assist in positioning. 2. Cardiac arrhythmia, unspecified cardiac arrhythmia type Check BMP and TSH as patient on amiodarone. 
- METABOLIC PANEL, BASIC; Future 
- TSH 3RD GENERATION; Future 
- REFERRAL TO CARDIOLOGY 3. CKD (chronic kidney disease) requiring chronic dialysis ([facility] 4. Anemia of chronic disease 5. High risk medication use Patient is dialysis dependent. As she is unable to tolerate PO intake, nutrition is supplemented with g-tube feeding of nepro. Daughter and patient endorse increased anxiety with dialysis and she therefore takes lorazepam prior to dialysis session three times a week. - IRON PROFILE; Future 
- HGB & HCT; Future - DRUG SCREEN-10 W/CONFIRM, SERUM; Future 6. Iron deficiency anemia, unspecified iron deficiency anemia type Recheck levels. - IRON PROFILE; Future - HGB & HCT; Future 7. History of stroke 8. Vascular dementia with depressed mood ([facility] Patient and daughter advised to re-establish with cardiology. 
- REFERRAL TO CARDIOLOGY 9. Yeast dermatitis 
- nystatin (MYCOSTATIN) powder; Apply  to affected area four (4) times daily. Dispense: 2 Bottle; Refill: 1 10. Dysphagia, unspecified type 11. Gastrostomy tube dependent ([facility] [facility] Patient has history of dysphagia and is unable to tolerate PO intake. She currently has g-tube placed to provide nutrition. GIven her renal disease, she has been placed on nepro can supplement. I have discussed the diagnosis with the patient and the intended plan as seen in the above orders. she has expressed understanding. The patient has received an after-visit summary and questions were answered concerning future plans. I have discussed medication side effects and warnings with the patient as well. Electronically Signed: Gilberto Barraza MD 
 
Current Medications after this visit Current Outpatient Medications Medication Sig  
 nystatin (MYCOSTATIN) powder Apply  to affected area four (4) times daily.  cholecalciferol (VITAMIN D3) (50,000 UNITS /1250 MCG) capsule Take 1 capsule every Monday and Friday  clopidogreL (PLAVIX) 75 mg tab Take 1 Tab by mouth daily.  ferrous sulfate 325 mg (65 mg iron) tablet Take 1 Tab by mouth two (2) times a day.  LORazepam (ATIVAN) 0.5 mg tablet Take 1 Tab by mouth every four (4) hours as needed for Anxiety. Max Daily Amount: 3 mg.  metoprolol tartrate (LOPRESSOR) 50 mg tablet Take 1 Tab by mouth two (2) times a day.  ondansetron hcl (Zofran) 4 mg tablet Take 1 Tab by mouth every six (6) hours as needed for Nausea or Nausea or Vomiting.  pantoprazole (Protonix) 40 mg granules for oral suspension Take 40 mg by mouth daily.  potassium chloride (KAON 20%) 40 mEq/15 mL liqd Take 15 mL by mouth daily.  40 MEQ/5 ML  
  NUT. TX.IMPAIRED RENAL FXN,SOY (NEPRO PO) Take  by mouth. 1.5 CANS VIA FEEDING TUBE EVERY 4 HOURS PER DAUGHTER  aspirin 81 mg chewable tablet Take 81 mg by mouth daily.  Nut. Tx. Impaired Renal Fxn,Soy (Nepro Carb Steady) 0.08 gram-1.8 kcal/mL liqd 4 oz by Gastrostomy Tube route five (5) times daily.  lactobacillus-acidophilus (LACTINEX) 100 million cell grpk Take 1 Packet by mouth two (2) times a day.  amiodarone (CORDARONE) 200 mg tablet Take 1 Tab by mouth two (2) times a day.  atorvastatin (LIPITOR) 40 mg tablet Take 1 Tab by mouth daily.  traZODone (DESYREL) 50 mg tablet TAKE 1/2 TAB PER G TUBE AT BEDTIME  docusate sodium (COLACE) 100 mg capsule Take 100 mg by mouth two (2) times a day. No current facility-administered medications for this visit. There are no discontinued medications. ~~~~~~~~~~~~~~~~~~~~~~~~~~~~~~~~~~~~~~~~~~~~~~ Chief Complaint Patient presents with  Medication Evaluation Discuss medication concerns History provided by patient and daughter History of Present Illness Marianna Bennett is a 71 y.o. female who presents to clinic today for: 
 
Medication evaluation Patient present with cc of medication evaluation. Daughter reports that she does not know what all the medication her mother is currently on. Patient had been hospitalized for blood transfusion. Daughter has stopped a number of medications as she did not know what they were for. Reviewed that amiodarone is for heart rate as well as metoprolol Health Maintenance Health Maintenance Due Topic Date Due  
 Hepatitis C Screening  1955  Lipid Screen  12/15/1964  DTaP/Tdap/Td series (1 - Tdap) 12/15/1975  Shingrix Vaccine Age 50> (1 of 2) 12/15/2004  Breast Cancer Screen Mammogram  12/15/2004  FOBT Q1Y Age 54-65  12/15/2004  Medicare Yearly Exam  11/03/2019  GLAUCOMA SCREENING Q2Y  12/15/2019  Bone Densitometry (Dexa) Screening  12/15/2019  Pneumococcal 65+ years (1 of 1 - PPSV23) 12/15/2019 Review of Systems Review of Systems Constitutional: Positive for malaise/fatigue. Negative for chills and fever. HENT: Negative for congestion and sinus pain. Respiratory: Negative for cough, sputum production, shortness of breath and stridor. Cardiovascular: Negative for chest pain, palpitations, claudication and leg swelling. Gastrointestinal: Negative for abdominal pain, blood in stool, constipation, diarrhea, nausea and vomiting. Genitourinary: Negative for dysuria, frequency, hematuria and urgency. Neurological: Negative for tingling, tremors, focal weakness, seizures, weakness and headaches. Psychiatric/Behavioral: Negative for hallucinations. The patient is not nervous/anxious and does not have insomnia. Vitals/Objective:  
 
Vitals:  
 06/17/20 5792 BP: 105/70 Pulse: 80 Resp: 16 Temp: 98.6 °F (37 °C) SpO2: 98% Weight: 109 lb (49.4 kg) Height: 5' 6\" (1.676 m) Body mass index is 17.59 kg/m². Wt Readings from Last 3 Encounters:  
06/17/20 109 lb (49.4 kg) 09/13/19 109 lb (49.4 kg) 08/14/19 130 lb (59 kg) Physical Exam 
Constitutional:   
   General: She is not in acute distress. Appearance: Normal appearance. She is well-developed and underweight. She is not toxic-appearing or diaphoretic. HENT:  
   Head: Normocephalic and atraumatic. Right Ear: External ear normal.  
   Left Ear: External ear normal.  
   Mouth/Throat:  
   Pharynx: No oropharyngeal exudate or posterior oropharyngeal erythema. Eyes:  
   General:     
   Right eye: No discharge. Left eye: No discharge. Conjunctiva/sclera: Conjunctivae normal.  
Cardiovascular:  
   Rate and Rhythm: Normal rate and regular rhythm. Heart sounds: S1 normal and S2 normal. No murmur. Pulmonary:  
   Effort: Pulmonary effort is normal. No respiratory distress. Breath sounds: Normal breath sounds. No wheezing or rales. Chest:  
 
 
Abdominal: Musculoskeletal:  
   Right lower leg: No edema. Left lower leg: No edema. Skin: 
   General: Skin is warm and dry. Findings: Rash present. Rash is macular, papular and pustular. Neurological:  
   Mental Status: She is alert. Mental status is at baseline. Comments: Patient is wheelchair bound No results found for this or any previous visit (from the past 24 hour(s)). Disposition Follow-up and Dispositions  · Return in about 3 months (around 2020) for Medication refill. . 
  
 
No future appointments. History Patient's past medical, surgical and family histories were reviewed and updated. Past Medical History:  
Diagnosis Date  CHF (congestive heart failure) ([facility] [address] 10/2013  Chronic kidney disease ESRD/ARF Dialysi Tues-Thurs- Sat  GERD (gastroesophageal reflux disease)  Hypertension  Insomnia  Severe mitral regurgitation 2013  Stroke [facility])   Urinary incontinence  Vascular dementia with depressed mood ([facility] Past Surgical History:  
Procedure Laterality Date  HX BACK SURGERY    
 3 herniated discs  HX  SECTION Family History Problem Relation Age of Onset  Diabetes Mother Type 2  
 Heart Failure Father  Diabetes Sister 3 sisters  Diabetes Brother   
     3 brothers  Hypertension Daughter Social History Tobacco Use  Smoking status: Former Smoker Types: Cigarettes Last attempt to quit: 2013 Years since quittin.4  Smokeless tobacco: Never Used Substance Use Topics  Alcohol use: No  
 Drug use: No  
 
 
Allergies No Known Allergies

## 2020-06-19 NOTE — PROGRESS NOTES
Home Health Certification and Plan Of Care Forms/ [employer]/ [address] [place] [address]. Of Medical Assistance Service form completed and faxed by 06/17/20- 06/19/20 to     [address] [place] [address].  Of [address] (f): [phone]  [place] (f): [phone]    Confirmation: OK

## 2020-06-22 NOTE — TELEPHONE ENCOUNTER
Pts daughter is asking if a prescription for her formula has been sent out ? Fax: [phone]  It is called \" meto \" she said it is liek cartons of milk. Daughter called- Maynor Pandya.

## 2020-06-22 NOTE — TELEPHONE ENCOUNTER
Daughter, Jason Venegas, called again about pts Samy. I gave pt our fax number to verify with Aries Hart they had correct number on file. IIsamar), will f/u with pts daughter tomorrow and keep eye out for order.

## 2020-06-23 PROBLEM — R13.10 DYSPHAGIA: Status: ACTIVE | Noted: 2020-01-01

## 2020-06-23 NOTE — TELEPHONE ENCOUNTER
Received call from Elmira Bernardo at [facility] they need additional information. They need documentation of a dx of dysphagia or that tube feeding is her sole source of nutrition from an OV w/n the last year.      Elmira Bernardo: Phone: [phone] ext [phone]  Fax: [phone]

## 2020-06-23 NOTE — TELEPHONE ENCOUNTER
Called daughter and clarified with her that mother gets Nepro 5 4-oz cans per day. Order sent to [facility] via [employer]. Please print order and my last note and fax to [phone].     Antonieta Lerma MD

## 2020-06-23 NOTE — TELEPHONE ENCOUNTER
Called pts daughter back this morning.  She said Jeffry Kennedy will not fax us over an order for the nepro, they said the Dr has to send in an order an office notes

## 2020-07-01 NOTE — TELEPHONE ENCOUNTER
In reference to telephone encounter from 5/28/20    \"Caller's first and last name: Rupesh Mantilla ([facility])        Reason for call: Requesting an order for a hospital bed mattress and a tub transfer bench and a three in one commode. Stated they all are for to improve safely when transferring pt. Order need to be sent to [facility] ([phone]        Callback required yes/no and why:Yes        Best contact number(s):[phone]        Details to clarify the request:        Gale Ray\"    Call from Rupesh Peisa from [facility]. Pt still needs the order for this equipment faxed to [facility] Fax# [phone]  This order was unable to be completed at the time due to pt needing an appt.  Can this be completed now or is an additional visit required?

## 2020-07-07 NOTE — PROGRESS NOTES
Rx for Hospital Bed Mattress Forms completed and faxed by 7/6/2020 to   [facility]- (: [phone]    Confirmation: OK

## 2020-07-21 NOTE — TELEPHONE ENCOUNTER
The insurance will not cover the dosage of the Zofran every 6 hours . Can you change dosing to once daily #30 with refills

## 2020-07-22 NOTE — TELEPHONE ENCOUNTER
June 26,2020 she improved a lot, some of her medication she was discharged with she wasn't taking them when living in the community, her health was declined due to the medication she received. She's doing therapy, daughter is still giving her medication as directed. For Further information this is the pt daughter number. Pt daughter Dima Nahunu: [phone],   pt requesting a call back.

## 2020-07-23 NOTE — TELEPHONE ENCOUNTER
Called patient's daughter. Daughter states that she has not received the contact information for the patient's cardiologist. Daughter given the contact number for Dr. Alex Luu office. 0 She also requested zofran be sent to [facility].      Ashley Whitehead MD

## 2020-11-02 NOTE — TELEPHONE ENCOUNTER
----- Message from Salinas Beck sent at 11/2/2020  9:07 AM EST -----  Regarding: Dr. Lynn Pavon first and last name: Rossy Becerra  Reason for call: Referral to a Cardiologist  Callback required yes/no and why: Yes  Best contact number(s): [phone]  Details to clarify the request: Pt's daughter called, needing more information about pt needing to see a cardiologist.

## 2020-11-02 NOTE — TELEPHONE ENCOUNTER
----- Message from Marilynn Etienne sent at 11/2/2020  9:08 AM EST -----  Regarding: Dr. Shahriar Nguyễn / telephone  Caller's first and last name: Avalos Lupe  Reason for call: Discuss continuation of Lorazapam for pt  Callback required yes/no and why: Yes  Best contact number(s): [phone]  Details to clarify the request: Pt's daughter would like to discuss future refills of Lorazapam for pt.

## 2020-11-09 NOTE — PROGRESS NOTES
Donya Recio 71 y.o. female 1955 [id number] [facility] Progress Note Encounter Date: 11/9/2020 Donya Recio is a 71 y.o. female who was seen by synchronous (real-time) audio-video technology on 11/9/2020. She and/or her healthcare decision maker is aware that this patient-initiated Telehealth encounter is a billable service, with coverage as determined by her insurance carrier. She  is aware that she may receive a bill and has provided verbal consent to proceed:Yes I was in the office while conducting this encounter. The patient was checked in/\"roomed\" by Juan Alberto Franco CMA via telephone in the office. The patient was at home during the encounter. This visit was completed virtually using Doxy. me 
Assessment and Plan:  
 
Encounter Diagnoses ICD-10-CM ICD-9-CM 1. Swelling of right upper extremity  M79.89 729.81  
2. Sundowning  F05 Bryanna Dallas 3. Psychophysiological insomnia  F51.04 307.42  
4. Dental caries  K02.9 521.00  
5. Anxiety  F41.9 300.00 1. Swelling of right upper extremity Patient's daughter given number for vascular surgeon that patient has seen in the past.  
- REFERRAL TO VASCULAR SURGERY 2. Sundowning 3. Psychophysiological insomnia Increase dose of trazodone to try and improve sleep at night. - traZODone (DESYREL) 50 mg tablet; TAKE 1 TAB PER G TUBE AT BEDTIME  Dispense: 90 Tab; Refill: 1 4. Dental caries 
- REFERRAL TO DENTISTRY 5. Anxiety For use prior to dialysis. - LORazepam (ATIVAN) 0.5 mg tablet; Take 1 Tab by mouth every Monday, Wednesday, Friday. Max Daily Amount: 0.5 mg. Prior to dialysis. Dispense: 30 Tab; Refill: 0 We discussed the expected course, resolution and complications of the diagnosis(es) in detail. Medication risks, benefits, costs, interactions, and alternatives were discussed as indicated.   I advised her to contact the office if her condition worsens, changes or fails to improve as anticipated. She expressed understanding with the diagnosis(es) and plan. CPT Codes 12762-57999 for Established Patients may apply to this Telehealth Visit Pursuant to the emergency declaration under the [facility], [facility] waiver authority and the [employer] Act, this Virtual  Visit was conducted, with patient's consent, to reduce the patient's risk of exposure to COVID-19 and provide continuity of care for an established patient. Services were provided through a video synchronous discussion virtually to substitute for in-person clinic visit. Electronically Signed: Yuni Silverman MD 
 
Current Medications after this visit Current Outpatient Medications Medication Sig  
 traZODone (DESYREL) 50 mg tablet TAKE 1 TAB PER G TUBE AT BEDTIME  LORazepam (ATIVAN) 0.5 mg tablet Take 1 Tab by mouth every Monday, Wednesday, Friday. Max Daily Amount: 0.5 mg. Prior to dialysis.  ondansetron hcl (Zofran) 4 mg tablet Take 1 Tab by mouth every six (6) hours as needed for Nausea or Nausea or Vomiting.  Nut. Tx. Impaired Renal Fxn,Soy (Nepro Carb Steady) 0.08 gram-1.8 kcal/mL liqd 4 oz by Gastrostomy Tube route five (5) times daily.  atorvastatin (LIPITOR) 40 mg tablet Take 1 Tab by mouth daily.  metoprolol tartrate (LOPRESSOR) 50 mg tablet Take 1 Tab by mouth two (2) times a day.  aspirin 81 mg chewable tablet Take 81 mg by mouth daily.  amiodarone (CORDARONE) 200 mg tablet Take 1 tablet by mouth twice daily  OTHER,NON-FORMULARY, 1 hospital bed mattress.  OTHER,NON-FORMULARY, 1 tub transfer bed.  Bedside Commode XX 1 3-in-1 bedside commode. Fax to [employer] [phone]  nystatin (MYCOSTATIN) powder Apply  to affected area four (4) times daily.  lactobacillus-acidophilus (LACTINEX) 100 million cell grpk Take 1 Packet by mouth two (2) times a day.  cholecalciferol (VITAMIN D3) (50,000 UNITS /1250 MCG) capsule Take 1 capsule every Monday and Friday  clopidogreL (PLAVIX) 75 mg tab Take 1 Tab by mouth daily.  ferrous sulfate 325 mg (65 mg iron) tablet Take 1 Tab by mouth two (2) times a day.  pantoprazole (Protonix) 40 mg granules for oral suspension Take 40 mg by mouth daily.  NUT. TX.IMPAIRED RENAL FXN,SOY (NEPRO PO) Take  by mouth. 1.5 CANS VIA FEEDING TUBE EVERY 4 HOURS PER DAUGHTER No current facility-administered medications for this visit. Medications Discontinued During This Encounter Medication Reason  traZODone (DESYREL) 50 mg tablet REORDER  potassium chloride (KAON 20%) 40 mEq/15 mL liqd Not A Current Medication  docusate sodium (COLACE) 100 mg capsule Not A Current Medication  LORazepam (ATIVAN) 0.5 mg tablet REORDER  
 
~~~~~~~~~~~~~~~~~~~~~~~~~~~~~~~~~~~~~~~~~~~~~~ Chief Complaint Patient presents with  
 Other  
  swelling in right arm  Medication Refill History of Present Illness Alisa Amaro is a 71 y.o. female who presents for: 
 
Right arm swelling Patient present with cc of right arm swelling. Daughter states that he arm has been swelling where she has defunct fistula. She has baljit followed by Vascular surgery. She has not followed up with cardiology as of today.  Patient has not been doing well recently. She is being more behavorial as the night goes on; calling out to  relatives. Daughter has been trying to keep her mother on a more day/night time with dark curtains in the evening and bright sunshine and plants in her room. Review of Systems Review of Systems Unable to perform ROS: Patient nonverbal  
  
 
Vitals/Objective:  
 
Due to this being a TeleHealth evaluation, many elements of the physical examination are unable to be assessed. Physical Exam 
Constitutional:   
   General: She is not in acute distress. Appearance: Normal appearance. She is well-developed and underweight. She is not toxic-appearing or diaphoretic. HENT:  
   Head: Normocephalic and atraumatic. Right Ear: External ear normal.  
   Left Ear: External ear normal.  
   Mouth/Throat:  
   Pharynx: No oropharyngeal exudate or posterior oropharyngeal erythema. Eyes:  
   General:     
   Right eye: No discharge. Left eye: No discharge. Conjunctiva/sclera: Conjunctivae normal.  
Cardiovascular:  
   Heart sounds: S1 normal and S2 normal. No murmur. Pulmonary:  
   Effort: Pulmonary effort is normal.  
Chest:  
 
 
Abdominal: Musculoskeletal:  
   Right shoulder: She exhibits swelling (swollen compared to left arm). Arms: 
 
   Right lower leg: No edema. Left lower leg: No edema. Neurological:  
   Mental Status: She is alert. Mental status is at baseline. Comments: Patient is wheelchair bound No results found for this or any previous visit (from the past 24 hour(s)). Disposition No future appointments. History Patient's past medical, surgical and family histories were reviewed and updated. Past Medical History:  
Diagnosis Date  CHF (congestive heart failure) ([facility] 10/2013  Chronic kidney disease ESRD/ARF Dialysi Tues-Thurs- Sat  GERD (gastroesophageal reflux disease)  Hypertension  Insomnia  Severe mitral regurgitation 2013  Stroke [facility])   Urinary incontinence  Vascular dementia with depressed mood ([facility] Past Surgical History:  
Procedure Laterality Date  HX BACK SURGERY    
 3 herniated discs  HX  SECTION Family History Problem Relation Age of Onset  Diabetes Mother Type 2  
 Heart Failure Father  Diabetes Sister 3 sisters  Diabetes Brother   
     3 brothers  Hypertension Daughter Social History Tobacco Use  Smoking status: Former Smoker Types: Cigarettes Last attempt to quit: 2013 Years since quittin.8  Smokeless tobacco: Never Used Substance Use Topics  Alcohol use: No  
 Drug use: No  
 
 
Allergies No Known Allergies

## 2020-11-09 NOTE — PROGRESS NOTES
Vamsi Briscoe 71 y.o. female 1955 [id number] 1 [facility] Progress Note Encounter Date: 11/9/2020 Assessment and Plan:  
No diagnosis found. *** There are no diagnoses linked to this encounter. I have discussed the diagnosis with the patient and the intended plan as seen in the above orders. she has expressed understanding. The patient has received an after-visit summary and questions were answered concerning future plans. I have discussed medication side effects and warnings with the patient as well. Electronically Signed: Maxine Dominguez MD 
 
Current Medications after this visit Current Outpatient Medications Medication Sig  
 amiodarone (CORDARONE) 200 mg tablet Take 1 tablet by mouth twice daily  ondansetron hcl (Zofran) 4 mg tablet Take 1 Tab by mouth every six (6) hours as needed for Nausea or Nausea or Vomiting.  OTHER,NON-FORMULARY, 1 hospital bed mattress.  OTHER,NON-FORMULARY, 1 tub transfer bed.  Bedside Commode XX 1 3-in-1 bedside commode. Fax to [employer] ([employer])[phone]  Nut. Tx. Impaired Renal Fxn,Soy (Nepro Carb Steady) 0.08 gram-1.8 kcal/mL liqd 4 oz by Gastrostomy Tube route five (5) times daily.  nystatin (MYCOSTATIN) powder Apply  to affected area four (4) times daily.  lactobacillus-acidophilus (LACTINEX) 100 million cell grpk Take 1 Packet by mouth two (2) times a day.  atorvastatin (LIPITOR) 40 mg tablet Take 1 Tab by mouth daily.  cholecalciferol (VITAMIN D3) (50,000 UNITS /1250 MCG) capsule Take 1 capsule every Monday and Friday  clopidogreL (PLAVIX) 75 mg tab Take 1 Tab by mouth daily.  ferrous sulfate 325 mg (65 mg iron) tablet Take 1 Tab by mouth two (2) times a day.  LORazepam (ATIVAN) 0.5 mg tablet Take 1 Tab by mouth every four (4) hours as needed for Anxiety. Max Daily Amount: 3 mg.   
 metoprolol tartrate (LOPRESSOR) 50 mg tablet Take 1 Tab by mouth two (2) times a day.  pantoprazole (Protonix) 40 mg granules for oral suspension Take 40 mg by mouth daily.  potassium chloride (KAON 20%) 40 mEq/15 mL liqd Take 15 mL by mouth daily. 40 MEQ/5 ML  
 traZODone (DESYREL) 50 mg tablet TAKE 1/2 TAB PER G TUBE AT BEDTIME  docusate sodium (COLACE) 100 mg capsule Take 100 mg by mouth two (2) times a day.  NUT. TX.IMPAIRED RENAL FXN,SOY (NEPRO PO) Take  by mouth. 1.5 CANS VIA FEEDING TUBE EVERY 4 HOURS PER DAUGHTER  aspirin 81 mg chewable tablet Take 81 mg by mouth daily. No current facility-administered medications for this visit. There are no discontinued medications. ~~~~~~~~~~~~~~~~~~~~~~~~~~~~~~~~~~~~~~~~~~~~~~ No chief complaint on file. History provided by {Relatives - adult:5061::\"patient\"} History of Present Illness Anaid Vargas is a 71 y.o. female who presents to clinic today for: 
 
*** Patient present with cc of *** Health Maintenance {Blank Multiple Select Template:20062::\"VIIS queried and reviewed; updated in chart as appropriate. \",\"Completed by outside provider. Release for records signed. \",\" recommendation discussed and ordered with patient's permission. \",\"*** scheduled with an outside provider; advised to have information forwarded once completed. \",\"Asked patient to schedule a Wellness appt to discuss issues. \"} Health Maintenance Due Topic Date Due  
 Hepatitis C Screening  1955  Lipid Screen  12/15/1964  DTaP/Tdap/Td series (1 - Tdap) 12/15/1975  Shingrix Vaccine Age 50> (1 of 2) 12/15/2004  Colorectal Cancer Screening Combo  12/15/2004  Breast Cancer Screen Mammogram  12/15/2004  Medicare Yearly Exam  11/03/2019  GLAUCOMA SCREENING Q2Y  12/15/2019  Bone Densitometry (Dexa) Screening  12/15/2019  Pneumococcal 65+ years (1 of 1 - PPSV23) 12/15/2019  Pneumococcal 65+ yrs at Risk Vaccine (1 of 2 - PCV13) 12/15/2019  Flu Vaccine (1) 09/01/2020 Review of Systems ROS *** 
 
 Vitals/Objective: There were no vitals filed for this visit. There is no height or weight on file to calculate BMI. Wt Readings from Last 3 Encounters:  
20 109 lb (49.4 kg) 19 109 lb (49.4 kg) 19 130 lb (59 kg) Physical Exam *** No results found for this or any previous visit (from the past 24 hour(s)). Disposition Future Appointments Date Time Provider Ashok Sotelo 2020  3:20 PM Les Iglesias MD [facility] History Patient's past medical, surgical and family histories were reviewed and updated. Past Medical History:  
Diagnosis Date  CHF (congestive heart failure) ([facility] [address] 10/2013  Chronic kidney disease ESRD/ARF Dialysi - Sat  GERD (gastroesophageal reflux disease)  Hypertension  Insomnia  Severe mitral regurgitation 2013  Stroke [facility])   Urinary incontinence  Vascular dementia with depressed mood ([facility] [address] Past Surgical History:  
Procedure Laterality Date  HX BACK SURGERY    
 3 herniated discs  HX  SECTION Family History Problem Relation Age of Onset  Diabetes Mother Type 2  
 Heart Failure Father  Diabetes Sister 3 sisters  Diabetes Brother   
     3 brothers  Hypertension Daughter Social History Tobacco Use  Smoking status: Former Smoker Types: Cigarettes Last attempt to quit: 2013 Years since quittin.8  Smokeless tobacco: Never Used Substance Use Topics  Alcohol use: No  
 Drug use: No  
 
 
Allergies No Known Allergies

## 2020-11-09 NOTE — PROGRESS NOTES
Identified pt with two pt identifiers(name and ). Reviewed record in preparation for visit and have obtained necessary documentation. Chief Complaint Patient presents with  
 Other  
  swelling in right arm  Medication Refill Health Maintenance Due Topic  Hepatitis C Screening  Lipid Screen  DTaP/Tdap/Td series (1 - Tdap)  Shingrix Vaccine Age 50> (1 of 2)  Colorectal Cancer Screening Combo  Breast Cancer Screen Mammogram   
 Medicare Yearly Exam   
 GLAUCOMA SCREENING Q2Y  Bone Densitometry (Dexa) Screening  Pneumococcal 65+ years (1 of 1 - PPSV23)  Pneumococcal 65+ yrs at Risk Vaccine (1 of 2 - PCV13)  Flu Vaccine (1) There were no vitals taken for this visit. Coordination of Care Questionnaire: 
:  
1) Have you been to an emergency room, urgent care, or hospitalized since your last visit?  no 
 
 
2. Have seen or consulted any other health care provider since your last visit?  
no 
 
 
 
 
Patient is accompanied by  I have received verbal consent from Jenelle Hilario to discuss any/all medical information while they are present in the room.

## 2020-11-10 NOTE — PROGRESS NOTES
[address] [facility] Forms completed and faxed by 11- to   (f):[phone]    Confirmation: OK

## 2020-11-20 PROBLEM — D64.9 ANEMIA: Status: ACTIVE | Noted: 2020-01-01

## 2020-11-20 NOTE — DIALYSIS
Patient tolerated 3 hour hemodialysis treatment well with 2 unit PRBC transfused with no issues. Net fluid remove 1.5 kg. Liters processed 73.2. Discharge to home with pascual, left via wheelchair accompanied by staff to main entrance into daughters car.

## 2020-11-20 NOTE — PROGRESS NOTES
Patient in SDS unit at 0708, and verbalized name and date of birth. Patient in bed. Will do assessment and labs. Will continue to monitor.
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
## 2020-12-03 NOTE — ED PROVIDER NOTES
EMERGENCY DEPARTMENT HISTORY AND PHYSICAL EXAM      Date: 12/3/2020  Patient Name: Tomasa Schofield    History of Presenting Illness     Chief Complaint   Patient presents with    Fall       History Provided By: Patient and EMS    HPI: Tomasa Schofield, 75 y.o. female with a past medical history significant Contractures and wheelchair-bound contractures and wheelchair-bound end-stage renal disease presents to the ED with cc of slipping out of the wheelchair and hitting her head inside the [place]. Patient denies any other pain or injuries. She notes that she has a mild pain on the left side of her head. She is a poor historian. There are no other complaints, changes, or physical findings at this time. PCP: Clint Garcia MD    No current facility-administered medications on file prior to encounter. Current Outpatient Medications on File Prior to Encounter   Medication Sig Dispense Refill    isosorbide mononitrate ER (IMDUR) 30 mg tablet TAKE ONE TABLET BY MOUTH ONCE DAILY 30 Tab 12    atorvastatin (LIPITOR) 20 mg tablet Take 1 tablet by mouth once daily 90 Tab 3    glipiZIDE SR (GLUCOTROL XL) 10 mg CR tablet Take 1 tablet by mouth once daily 90 Tab 3    colchicine 0.6 mg tablet TAKE 1 TABLET BY MOUTH DAILY AS NEEDED FOR  GOUT 30 Tab 12    ipratropium (ATROVENT) 42 mcg (0.06 %) nasal spray 2 Sprays by Both Nostrils route four (4) times daily. 15 mL 3    diclofenac (VOLTAREN) 1 % gel Apply  to affected area four (4) times daily. 100 g 12    amLODIPine (NORVASC) 5 mg tablet Take 1 tablet by mouth once daily 30 Tab 0    atenoloL (TENORMIN) 100 mg tablet Take 1/2 (one-half) tablet by mouth once daily 30 Tab 0    fluticasone propionate (FLONASE) 50 mcg/actuation nasal spray 2 Sprays by Both Nostrils route daily.  1 Bottle 12    isosorbide mononitrate ER (IMDUR) 30 mg tablet Take 1 tablet by mouth once daily 90 Tab 0    sodium chloride (OCEAN) 0.65 % nasal squeeze bottle 0.1 mL by Both Nostrils route as needed for Congestion. And dryness. 44 mL 3    fluticasone propionate (FLONASE) 50 mcg/actuation nasal spray 2 Sprays by Both Nostrils route daily. 1 Bottle 12    butalbital-acetaminophen-caff (FIORICET) -40 mg per capsule Take 1 Cap by mouth every four (4) hours as needed for Pain. Indications: Tension Headache 20 Cap 0    allopurinol (ZYLOPRIM) 100 mg tablet TAKE ONE TABLET BY MOUTH ONCE DAILY 90 Tab 1    sucroferric oxyhydroxide (VELPHORO) 500 mg chew chewable tablet Take  by mouth three (3) times daily (with meals).  guaiFENesin-codeine (CHERATUSSIN AC) 100-10 mg/5 mL solution Take 5 mL by mouth four (4) times daily as needed for Cough. Max Daily Amount: 20 mL. (Patient not taking: Reported on 8/27/2020) 240 mL 0    zolpidem (AMBIEN) 5 mg tablet Take 1 Tab by mouth nightly as needed for Sleep. Max Daily Amount: 5 mg. 30 Tab 0    colchicine 0.6 mg tablet TAKE ONE TABLET BY MOUTH ONCE DAILY AS NEEDED FOR  GOUT 30 Tab 6    losartan (COZAAR) 100 mg tablet TAKE ONE TABLET BY MOUTH ONCE DAILY (Patient not taking: Reported on 8/27/2020) 30 Tab 12    b complex-vitamin c-folic acid (NEPHROCAPS) 1 mg capsule Take 1 Cap by mouth daily.  dronedarone (MULTAQ) tab tablet TAKE ONE TABLET BY MOUTH TWICE DAILY WITH MEALS 60 Tab 12    sevelamer carbonate (RENVELA) 800 mg tab tab Take 1,600 mg by mouth three (3) times daily.  glipiZIDE (GLUCOTROL) 5 mg tablet Take 1 Tab by mouth daily. (Patient not taking: Reported on 8/27/2020) 30 Tab 12    sevelamer carbonate (RENVELA) 800 mg tab tab Take  by mouth three (3) times daily.  acetaminophen (TYLENOL EXTRA STRENGTH) 500 mg tablet Take  by mouth every six (6) hours as needed for Pain.  calcium acetate (PHOSLO) 667 mg cap       polyethylene glycol (MIRALAX) 17 gram packet Take 1 Packet by mouth daily.  27 Packet 2       Past History     Past Medical History:  Past Medical History:   Diagnosis Date    Acute on chronic renal failure ([facility] [address] [place] dialysis M-W-F    Adverse effect of anesthesia     gets cold shakes after anesthesia procedure in 88    Arthritis     Carpal tunnel in both hands    Chronic renal disease, stage IV ([facility] [address] 10/29/2015    Chronic renal failure 5/19/2011    Diabetes Mellitus ( non-insulin dependent ) 3/22/2011    ESRD (end stage renal disease) ([facility] [address] 3/22/2011    GERD - Esophagitis - reflux 3/22/2011    Gout 3/22/2011    Hand Tendonitis 3/22/2011    bilateral     Hypertension 3/22/2011    Iron deficiency anemia, unspecified 3/22/2011    Pure Hypercholesterolemia with normal triglycerides 3/22/2011    Unspecified adverse effect of anesthesia     patient stated she \"tried to go into shock\"       Past Surgical History:  Past Surgical History:   Procedure Laterality Date    ABDOMEN SURGERY PROC UNLISTED  1989    gallbladder removal    HX CHOLECYSTECTOMY      HX VASCULAR ACCESS  09/2016    neck access for dialysis    REMOVAL GALLBLADDER  1989       Family History:  No family history on file. Social History:  Social History     Tobacco Use    Smoking status: Never Smoker    Smokeless tobacco: Never Used   Substance Use Topics    Alcohol use: No     Alcohol/week: 0.0 standard drinks    Drug use: Not Currently     Types: Prescription, OTC       Allergies: Allergies   Allergen Reactions    Influenza Virus Vaccine, Specific Other (comments)     Allergy documented in admission data base    Statins-Hmg-Coa Reductase Inhibitors Unable to Obtain    Sulfur Itching         Review of Systems     Review of Systems   Constitutional: Negative. Negative for appetite change, chills, fatigue and fever. HENT: Negative. Negative for congestion and sinus pain. Eyes: Negative. Negative for pain and visual disturbance. Respiratory: Negative. Negative for chest tightness and shortness of breath. Cardiovascular: Negative. Negative for chest pain. Gastrointestinal: Negative.   Negative for abdominal pain, diarrhea, nausea and vomiting. Genitourinary: Negative. Negative for difficulty urinating. No discharge   Musculoskeletal: Negative. Negative for arthralgias. Skin: Negative. Negative for rash. Neurological: Positive for headaches. Negative for weakness. Hematological: Negative. Psychiatric/Behavioral: Negative. Negative for agitation. The patient is not nervous/anxious. All other systems reviewed and are negative. Physical Exam     Physical Exam  Vitals signs and nursing note reviewed. Constitutional:       General: She is not in acute distress. Appearance: She is well-developed. HENT:      Head: Normocephalic and atraumatic. Comments: Hematoma to left temporal area     Nose: Nose normal.      Mouth/Throat:      Mouth: Mucous membranes are moist.      Pharynx: Oropharynx is clear. No oropharyngeal exudate. Eyes:      General:         Right eye: No discharge. Left eye: No discharge. Conjunctiva/sclera: Conjunctivae normal.      Pupils: Pupils are equal, round, and reactive to light. Neck:      Musculoskeletal: Normal range of motion and neck supple. Cardiovascular:      Rate and Rhythm: Normal rate and regular rhythm. Chest Wall: PMI is not displaced. No thrill. Heart sounds: Normal heart sounds. No murmur. No friction rub. No gallop. Pulmonary:      Effort: Pulmonary effort is normal. No respiratory distress. Breath sounds: Normal breath sounds. No wheezing or rales. Chest:      Chest wall: No tenderness. Abdominal:      General: Bowel sounds are normal. There is no distension. Palpations: Abdomen is soft. There is no mass. Tenderness: There is no abdominal tenderness. There is no guarding or rebound. Musculoskeletal: Normal range of motion. General: Deformity present. Comments: Contractures of bilateral upper and lower extremities   Lymphadenopathy:      Cervical: No cervical adenopathy.    Skin:     General: Skin is warm and dry. Capillary Refill: Capillary refill takes less than 2 seconds. Findings: No erythema or rash. Neurological:      Mental Status: She is alert and oriented to person, place, and time. Mental status is at baseline. Cranial Nerves: No cranial nerve deficit. Coordination: Coordination normal.   Psychiatric:         Mood and Affect: Mood normal.         Behavior: Behavior normal.         Lab and Diagnostic Study Results     Labs -   No results found for this or any previous visit (from the past 12 hour(s)). Radiologic Studies -   @lastxrresult@  CT Results  (Last 48 hours)               12/03/20 1545  CT HEAD WO CONT Final result    Impression:  Impression:   1. Left lateral facial soft tissue hematoma. 2.  No acute intracranial hemorrhage. 3.  Cortical hypodensity in the posterior right occipital lobe, suggesting late   subacute or chronic encephalomalacia. Progressive chronic small vessel ischemic   disease. 4.  Rapid progression of generalized cerebral volume loss compared to head CT   from 11/18/2019. Narrative:  Study: Head CT without contrast.       Clinical indication: Trauma       Technique: Routine volume acquisition of the head was obtained without IV   contrast. Coronal and sagittal reformations were generated in soft tissue and   bone kernels. Dose reduction: All CT scans at this facility are performed using   dose reduction optimization techniques as appropriate to a performed exam   including the following: Automated exposure control, adjustments of the mA   and/or kV according to patient size, or use of iterative reconstruction   technique. Comparison: Head CT 11/18/2019. Findings:        Generalized cerebral volume loss, significantly progressed since prior head CT. No evidence of acute intracranial hemorrhage, mass effect, midline shift or   abnormal extra-axial fluid collection.  Ventricles and basal cisterns are   preserved and are symmetric. Prominent periventricular and subcortical white   matter hypodensities bilaterally, suggesting chronic small vessel ischemic   change. Cortical hypodensity in the posterior right occipital lobe suggestive of   a late subacute or chronic infarction. Remote right basal ganglia lacunar infarctions, not seen on the prior study. Severe intracranial vascular calcifications. Left lateral facial hematoma just above the zygomatic arch. No evidence of skull   fracture. Visualized paranasal sinuses and mastoid air cells are clear. Globes   and orbits are intact. 12/03/20 1545  CT SPINE CERV WO CONT Final result    Impression:  Impression:   Nonspecific destructive changes/fragmentation about the endplates at F9-H7 and   C7-T1. Unlikely related to trauma. Could be due to degenerative change, but   inflammatory process such as discitis cannot be excluded. Consider follow-up MRI   based on clinical concern. Narrative:  Study: CT of the cervical spine without contrast.       Clinical indication: Trauma. Technique: CT volume acquisition through the cervical spine was obtained. Axial,   sagittal, coronal images were provided in bone and soft tissue kernels. Comparison: None available. Findings:       Endplate destructive changes are seen at C5-C6 and C7-T1, etiology   indeterminate. No significant prevertebral soft tissue swelling. These plates   appear fragmented, but it is unclear if this is related to chronic degenerative   change or inflammatory process. Pattern is unlikely to be related to acute   trauma. Severe carotid atherosclerosis. Visualized lung apices are clear. Tunneled right   IJ central venous catheter partially visualized. CXR Results  (Last 48 hours)    None            Medical Decision Making   - I am the first provider for this patient.     - I reviewed the vital signs, available nursing notes, past medical history, past surgical history, family history and social history. - Initial assessment performed. The patients presenting problems have been discussed, and they are in agreement with the care plan formulated and outlined with them. I have encouraged them to ask questions as they arise throughout their visit. Vital Signs-Reviewed the patient's vital signs. Patient Vitals for the past 12 hrs:   Temp Pulse Resp BP SpO2   12/03/20 1527     100 %   12/03/20 1525 100.4 °F (38 °C) (!) 109 23 126/71        Records Reviewed: Nursing Notes      ED Course:     ED Course as of Dec 03 1631   Thu Dec 03, 2020   1629 He has no evidence of acute pathology on CT of the neck or the cervical spine or head CT. Have some chronic changes that we will put in her discharge instructions and have her follow-up. [CS]      ED Course User Index  [CS] Renny Dakins, MD       Provider Notes (Medical Decision Making):   Is no evidence of acute changes on the CT scan of her head after her fall. Does appear to be a contusion at this time. Will discharge and have her follow-up with her PCP. MDM       Procedures   Medical Decision Makingedical Decision Making  Performed by: Сергей Diamond MD  PROCEDURES:  Procedures       Disposition   Disposition: DC- Adult Discharges: All of the diagnostic tests were reviewed and questions answered. Diagnosis, care plan and treatment options were discussed. The patient understands the instructions and will follow up as directed. The patients results have been reviewed with them. They have been counseled regarding their diagnosis. The patient verbally convey understanding and agreement of the signs, symptoms, diagnosis, treatment and prognosis and additionally agrees to follow up as recommended with their PCP in 24 - 48 hours. They also agree with the care-plan and convey that all of their questions have been answered.   I have also put together some discharge instructions for them that include: 1) educational information regarding their diagnosis, 2) how to care for their diagnosis at home, as well a 3) list of reasons why they would want to return to the ED prior to their follow-up appointment, should their condition change. Discharged    DISCHARGE PLAN:  1. Current Discharge Medication List      CONTINUE these medications which have NOT CHANGED    Details   !! isosorbide mononitrate ER (IMDUR) 30 mg tablet TAKE ONE TABLET BY MOUTH ONCE DAILY  Qty: 30 Tab, Refills: 12    Comments: Please consider 90 day supplies to promote better adherence  Associated Diagnoses: Essential hypertension      atorvastatin (LIPITOR) 20 mg tablet Take 1 tablet by mouth once daily  Qty: 90 Tab, Refills: 3      glipiZIDE SR (GLUCOTROL XL) 10 mg CR tablet Take 1 tablet by mouth once daily  Qty: 90 Tab, Refills: 3    Associated Diagnoses: Type II diabetes mellitus with nephropathy ([facility] [address]      ! ! colchicine 0.6 mg tablet TAKE 1 TABLET BY MOUTH DAILY AS NEEDED FOR  GOUT  Qty: 30 Tab, Refills: 12    Associated Diagnoses: Chronic gout involving toe, unspecified cause, unspecified laterality      ipratropium (ATROVENT) 42 mcg (0.06 %) nasal spray 2 Sprays by Both Nostrils route four (4) times daily. Qty: 15 mL, Refills: 3      diclofenac (VOLTAREN) 1 % gel Apply  to affected area four (4) times daily. Qty: 100 g, Refills: 12      amLODIPine (NORVASC) 5 mg tablet Take 1 tablet by mouth once daily  Qty: 30 Tab, Refills: 0    Associated Diagnoses: Essential hypertension      atenoloL (TENORMIN) 100 mg tablet Take 1/2 (one-half) tablet by mouth once daily  Qty: 30 Tab, Refills: 0    Associated Diagnoses: Essential hypertension      !! fluticasone propionate (FLONASE) 50 mcg/actuation nasal spray 2 Sprays by Both Nostrils route daily.   Qty: 1 Bottle, Refills: 12      !! isosorbide mononitrate ER (IMDUR) 30 mg tablet Take 1 tablet by mouth once daily  Qty: 90 Tab, Refills: 0    Associated Diagnoses: Essential hypertension      sodium chloride (OCEAN) 0.65 % nasal squeeze bottle 0.1 mL by Both Nostrils route as needed for Congestion. And dryness. Qty: 44 mL, Refills: 3    Associated Diagnoses: Pharyngitis, unspecified etiology; Rhinitis, unspecified type      !! fluticasone propionate (FLONASE) 50 mcg/actuation nasal spray 2 Sprays by Both Nostrils route daily. Qty: 1 Bottle, Refills: 12    Associated Diagnoses: Pharyngitis, unspecified etiology; Rhinitis, unspecified type      butalbital-acetaminophen-caff (FIORICET) -40 mg per capsule Take 1 Cap by mouth every four (4) hours as needed for Pain. Indications: Tension Headache  Qty: 20 Cap, Refills: 0      allopurinol (ZYLOPRIM) 100 mg tablet TAKE ONE TABLET BY MOUTH ONCE DAILY  Qty: 90 Tab, Refills: 1    Associated Diagnoses: Chronic gout without tophus, unspecified cause, unspecified site      sucroferric oxyhydroxide (VELPHORO) 500 mg chew chewable tablet Take  by mouth three (3) times daily (with meals). guaiFENesin-codeine (CHERATUSSIN AC) 100-10 mg/5 mL solution Take 5 mL by mouth four (4) times daily as needed for Cough. Max Daily Amount: 20 mL. Qty: 240 mL, Refills: 0    Associated Diagnoses: Acute bronchitis, unspecified organism      zolpidem (AMBIEN) 5 mg tablet Take 1 Tab by mouth nightly as needed for Sleep. Max Daily Amount: 5 mg. Qty: 30 Tab, Refills: 0    Associated Diagnoses: Primary insomnia      !! colchicine 0.6 mg tablet TAKE ONE TABLET BY MOUTH ONCE DAILY AS NEEDED FOR  GOUT  Qty: 30 Tab, Refills: 6    Comments: Please consider 90 day supplies to promote better adherence      losartan (COZAAR) 100 mg tablet TAKE ONE TABLET BY MOUTH ONCE DAILY  Qty: 30 Tab, Refills: 12    Comments: Please consider 90 day supplies to promote better adherence      b complex-vitamin c-folic acid (NEPHROCAPS) 1 mg capsule Take 1 Cap by mouth daily.     Associated Diagnoses: ESRD (end stage renal disease) (HCC)      dronedarone (MULTAQ) tab tablet TAKE ONE TABLET BY MOUTH TWICE DAILY WITH MEALS  Qty: 60 Tab, Refills: 12    Associated Diagnoses: Essential hypertension      !! sevelamer carbonate (RENVELA) 800 mg tab tab Take 1,600 mg by mouth three (3) times daily. glipiZIDE (GLUCOTROL) 5 mg tablet Take 1 Tab by mouth daily. Qty: 30 Tab, Refills: 12    Associated Diagnoses: Type II diabetes mellitus with nephropathy ([address]      ! ! sevelamer carbonate (RENVELA) 800 mg tab tab Take  by mouth three (3) times daily. Associated Diagnoses: CKD (chronic kidney disease) stage V requiring chronic dialysis (HCC)      acetaminophen (TYLENOL EXTRA STRENGTH) 500 mg tablet Take  by mouth every six (6) hours as needed for Pain. calcium acetate (PHOSLO) 667 mg cap       polyethylene glycol (MIRALAX) 17 gram packet Take 1 Packet by mouth daily. Qty: 30 Packet, Refills: 2       !! - Potential duplicate medications found. Please discuss with provider. 2.   Follow-up Information     Follow up With Specialties Details Why Sis Kern MD Internal Medicine Call in 2 days As needed, If symptoms worsen [facility]  [phone]          3. Return to ED if worse   4. Current Discharge Medication List            Diagnosis     Clinical Impression:   1. Contusion of scalp, initial encounter        Attestations:    Rodney Spencer MD    Please note that this dictation was completed with [employer], the computer voice recognition software. Quite often unanticipated grammatical, syntax, homophones, and other interpretive errors are inadvertently transcribed by the computer software. Please disregard these errors. Please excuse any errors that have escaped final proofreading. Thank you.

## 2020-12-03 NOTE — LETTER
NOTIFICATION RETURN TO WORK / SCHOOL 
 
12/3/2020 11:33 PM 
 
Ms. Margaret Corcoran [address] [place] [address] [phone] To Whom It May Concern: 
 
Margaret Corcoran is currently under the care of [facility] EMERGENCY DEPT. Please excuse her daughter, Emil Reyes, from work until Saturday, December 5th, 2020. If there are questions or concerns please have the patient contact our office.  
 
 
 
Sincerely, 
 
 
Ramirez Peoples RN

## 2020-12-03 NOTE — ED PROVIDER NOTES
History Provided By: Patient and EMS    HPI: Octavia Johnson, 75 y.o. female with a past medical history significant Contractures and wheelchair-bound contractures and wheelchair-bound end-stage renal disease presents to the ED with cc of slipping out of the wheelchair and hitting her head inside the [id number]. Patient denies any other pain or injuries. She notes that she has a mild pain on the left side of her head. She is a poor historian. There are no other complaints, changes, or physical findings at this time. PCP: Kimberlyn Saunders MD    No current facility-administered medications on file prior to encounter. Current Outpatient Medications on File Prior to Encounter   Medication Sig Dispense Refill    isosorbide mononitrate ER (IMDUR) 30 mg tablet TAKE ONE TABLET BY MOUTH ONCE DAILY 30 Tab 12    atorvastatin (LIPITOR) 20 mg tablet Take 1 tablet by mouth once daily 90 Tab 3    glipiZIDE SR (GLUCOTROL XL) 10 mg CR tablet Take 1 tablet by mouth once daily 90 Tab 3    colchicine 0.6 mg tablet TAKE 1 TABLET BY MOUTH DAILY AS NEEDED FOR  GOUT 30 Tab 12    ipratropium (ATROVENT) 42 mcg (0.06 %) nasal spray 2 Sprays by Both Nostrils route four (4) times daily. 15 mL 3    diclofenac (VOLTAREN) 1 % gel Apply  to affected area four (4) times daily. 100 g 12    amLODIPine (NORVASC) 5 mg tablet Take 1 tablet by mouth once daily 30 Tab 0    atenoloL (TENORMIN) 100 mg tablet Take 1/2 (one-half) tablet by mouth once daily 30 Tab 0    fluticasone propionate (FLONASE) 50 mcg/actuation nasal spray 2 Sprays by Both Nostrils route daily. 1 Bottle 12    isosorbide mononitrate ER (IMDUR) 30 mg tablet Take 1 tablet by mouth once daily 90 Tab 0    sodium chloride (OCEAN) 0.65 % nasal squeeze bottle 0.1 mL by Both Nostrils route as needed for Congestion. And dryness. 44 mL 3    fluticasone propionate (FLONASE) 50 mcg/actuation nasal spray 2 Sprays by Both Nostrils route daily.  1 Bottle 12    butalbital-acetaminophen-caff (FIORICET) -40 mg per capsule Take 1 Cap by mouth every four (4) hours as needed for Pain. Indications: Tension Headache 20 Cap 0    allopurinol (ZYLOPRIM) 100 mg tablet TAKE ONE TABLET BY MOUTH ONCE DAILY 90 Tab 1    sucroferric oxyhydroxide (VELPHORO) 500 mg chew chewable tablet Take  by mouth three (3) times daily (with meals).  guaiFENesin-codeine (CHERATUSSIN AC) 100-10 mg/5 mL solution Take 5 mL by mouth four (4) times daily as needed for Cough. Max Daily Amount: 20 mL. (Patient not taking: Reported on 8/27/2020) 240 mL 0    zolpidem (AMBIEN) 5 mg tablet Take 1 Tab by mouth nightly as needed for Sleep. Max Daily Amount: 5 mg. 30 Tab 0    colchicine 0.6 mg tablet TAKE ONE TABLET BY MOUTH ONCE DAILY AS NEEDED FOR  GOUT 30 Tab 6    losartan (COZAAR) 100 mg tablet TAKE ONE TABLET BY MOUTH ONCE DAILY (Patient not taking: Reported on 8/27/2020) 30 Tab 12    b complex-vitamin c-folic acid (NEPHROCAPS) 1 mg capsule Take 1 Cap by mouth daily.  dronedarone (MULTAQ) tab tablet TAKE ONE TABLET BY MOUTH TWICE DAILY WITH MEALS 60 Tab 12    sevelamer carbonate (RENVELA) 800 mg tab tab Take 1,600 mg by mouth three (3) times daily.  glipiZIDE (GLUCOTROL) 5 mg tablet Take 1 Tab by mouth daily. (Patient not taking: Reported on 8/27/2020) 30 Tab 12    sevelamer carbonate (RENVELA) 800 mg tab tab Take  by mouth three (3) times daily.  acetaminophen (TYLENOL EXTRA STRENGTH) 500 mg tablet Take  by mouth every six (6) hours as needed for Pain.  calcium acetate (PHOSLO) 667 mg cap       polyethylene glycol (MIRALAX) 17 gram packet Take 1 Packet by mouth daily.  27 Packet 2       Past History     Past Medical History:  Past Medical History:   Diagnosis Date    Acute on chronic renal failure (HCC)     [facility] [id number]    Adverse effect of anesthesia     gets cold shakes after anesthesia procedure in 88    Arthritis     Carpal tunnel in both hands    Chronic renal disease, stage IV ([facility] 10/29/2015    Chronic renal failure 5/19/2011    Diabetes Mellitus ( non-insulin dependent ) 3/22/2011    ESRD (end stage renal disease) ([facility] 3/22/2011    GERD - Esophagitis - reflux 3/22/2011    Gout 3/22/2011    Hand Tendonitis 3/22/2011    bilateral     Hypertension 3/22/2011    Iron deficiency anemia, unspecified 3/22/2011    Pure Hypercholesterolemia with normal triglycerides 3/22/2011    Unspecified adverse effect of anesthesia     patient stated she \"tried to go into shock\"       Past Surgical History:  Past Surgical History:   Procedure Laterality Date    ABDOMEN SURGERY PROC UNLISTED  1989    gallbladder removal    HX CHOLECYSTECTOMY      HX VASCULAR ACCESS  09/2016    neck access for dialysis    REMOVAL GALLBLADDER  1989       Family History:  No family history on file. Social History:  Social History     Tobacco Use    Smoking status: Never Smoker    Smokeless tobacco: Never Used   Substance Use Topics    Alcohol use: No     Alcohol/week: 0.0 standard drinks    Drug use: Not Currently     Types: Prescription, OTC       Allergies: Allergies   Allergen Reactions    Influenza Virus Vaccine, Specific Other (comments)     Allergy documented in admission data base    Statins-Hmg-Coa Reductase Inhibitors Unable to Obtain    Sulfur Itching         Review of Systems     Review of Systems   Constitutional: Negative. Negative for appetite change, chills, fatigue and fever. HENT: Negative. Negative for congestion and sinus pain. Eyes: Negative. Negative for pain and visual disturbance. Respiratory: Negative. Negative for chest tightness and shortness of breath. Cardiovascular: Negative. Negative for chest pain. Gastrointestinal: Negative. Negative for abdominal pain, diarrhea, nausea and vomiting. Genitourinary: Negative. Negative for difficulty urinating. No discharge   Musculoskeletal: Negative. Negative for arthralgias.    Skin: Negative. Negative for rash. Neurological: Positive for headaches. Negative for weakness. Hematological: Negative. Psychiatric/Behavioral: Negative. Negative for agitation. The patient is not nervous/anxious. All other systems reviewed and are negative. Physical Exam     Physical Exam  Vitals signs and nursing note reviewed. Constitutional:       General: She is not in acute distress. Appearance: She is well-developed. HENT:      Head: Normocephalic and atraumatic. Comments: Hematoma to left temporal area     Nose: Nose normal.      Mouth/Throat:      Mouth: Mucous membranes are moist.      Pharynx: Oropharynx is clear. No oropharyngeal exudate. Eyes:      General:         Right eye: No discharge. Left eye: No discharge. Conjunctiva/sclera: Conjunctivae normal.      Pupils: Pupils are equal, round, and reactive to light. Neck:      Musculoskeletal: Normal range of motion and neck supple. Cardiovascular:      Rate and Rhythm: Normal rate and regular rhythm. Chest Wall: PMI is not displaced. No thrill. Heart sounds: Normal heart sounds. No murmur. No friction rub. No gallop. Pulmonary:      Effort: Pulmonary effort is normal. No respiratory distress. Breath sounds: Normal breath sounds. No wheezing or rales. Chest:      Chest wall: No tenderness. Abdominal:      General: Bowel sounds are normal. There is no distension. Palpations: Abdomen is soft. There is no mass. Tenderness: There is no abdominal tenderness. There is no guarding or rebound. Musculoskeletal: Normal range of motion. General: Deformity present. Comments: Contractures of bilateral upper and lower extremities   Lymphadenopathy:      Cervical: No cervical adenopathy. Skin:     General: Skin is warm and dry. Capillary Refill: Capillary refill takes less than 2 seconds. Findings: No erythema or rash.    Neurological:      Mental Status: She is alert and oriented to person, place, and time. Mental status is at baseline. Cranial Nerves: No cranial nerve deficit. Coordination: Coordination normal.   Psychiatric:         Mood and Affect: Mood normal.         Behavior: Behavior normal.         Lab and Diagnostic Study Results     Labs -   No results found for this or any previous visit (from the past 12 hour(s)). Radiologic Studies -   @lastxrresult@  CT Results  (Last 48 hours)               12/03/20 1545  CT HEAD WO CONT Final result    Impression:  Impression:   1. Left lateral facial soft tissue hematoma. 2.  No acute intracranial hemorrhage. 3.  Cortical hypodensity in the posterior right occipital lobe, suggesting late   subacute or chronic encephalomalacia. Progressive chronic small vessel ischemic   disease. 4.  Rapid progression of generalized cerebral volume loss compared to head CT   from 11/18/2019. Narrative:  Study: Head CT without contrast.       Clinical indication: Trauma       Technique: Routine volume acquisition of the head was obtained without IV   contrast. Coronal and sagittal reformations were generated in soft tissue and   bone kernels. Dose reduction: All CT scans at this facility are performed using   dose reduction optimization techniques as appropriate to a performed exam   including the following: Automated exposure control, adjustments of the mA   and/or kV according to patient size, or use of iterative reconstruction   technique. Comparison: Head CT 11/18/2019. Findings:        Generalized cerebral volume loss, significantly progressed since prior head CT. No evidence of acute intracranial hemorrhage, mass effect, midline shift or   abnormal extra-axial fluid collection. Ventricles and basal cisterns are   preserved and are symmetric. Prominent periventricular and subcortical white   matter hypodensities bilaterally, suggesting chronic small vessel ischemic   change.  Cortical hypodensity in the posterior right occipital lobe suggestive of   a late subacute or chronic infarction. Remote right basal ganglia lacunar infarctions, not seen on the prior study. Severe intracranial vascular calcifications. Left lateral facial hematoma just above the zygomatic arch. No evidence of skull   fracture. Visualized paranasal sinuses and mastoid air cells are clear. Globes   and orbits are intact. 12/03/20 1545  CT SPINE CERV WO CONT Final result    Impression:  Impression:   Nonspecific destructive changes/fragmentation about the endplates at U0-R3 and   C7-T1. Unlikely related to trauma. Could be due to degenerative change, but   inflammatory process such as discitis cannot be excluded. Consider follow-up MRI   based on clinical concern. Narrative:  Study: CT of the cervical spine without contrast.       Clinical indication: Trauma. Technique: CT volume acquisition through the cervical spine was obtained. Axial,   sagittal, coronal images were provided in bone and soft tissue kernels. Comparison: None available. Findings:       Endplate destructive changes are seen at C5-C6 and C7-T1, etiology   indeterminate. No significant prevertebral soft tissue swelling. These plates   appear fragmented, but it is unclear if this is related to chronic degenerative   change or inflammatory process. Pattern is unlikely to be related to acute   trauma. Severe carotid atherosclerosis. Visualized lung apices are clear. Tunneled right   IJ central venous catheter partially visualized. CXR Results  (Last 48 hours)    None            Medical Decision Making   - I am the first provider for this patient. - I reviewed the vital signs, available nursing notes, past medical history, past surgical history, family history and social history. - Initial assessment performed.  The patients presenting problems have been discussed, and they are in agreement with the care plan formulated and outlined with them. I have encouraged them to ask questions as they arise throughout their visit. Vital Signs-Reviewed the patient's vital signs. Patient Vitals for the past 12 hrs:   Temp Pulse Resp BP SpO2   12/03/20 1527     100 %   12/03/20 1525 100.4 °F (38 °C) (!) 109 23 126/71        Records Reviewed: Nursing Notes        ED Course:     ED Course as of Dec 03 1631   Thu Dec 03, 2020   1629 He has no evidence of acute pathology on CT of the neck or the cervical spine or head CT. Have some chronic changes that we will put in her discharge instructions and have her follow-up. [CS]      ED Course User Index  [CS] Lucius Shine MD       Provider Notes (Medical Decision Making):   Is no evidence of acute changes on the CT scan of her head after her fall. Does appear to be a contusion at this time. Will discharge and have her follow-up with her PCP. Tenderness extensive discussion with the patient's daughter with regard to her lab values are elevated white blood cell count and the fevers. In offering the patient admission the daughter states no she would definitely like to take the patient home. I have mentioned that we will call in an antibiotic for her because I have concerned about this unknown fever. I explained that I tested her for flu as well as for Covid and encouraged her to bring her back to the emergency room should anything change or if she changes her mind. MDM       Procedures   Medical Decision Makingedical Decision Making  Performed by: Chris Da Silva MD  PROCEDURES:  Procedures       Disposition   Disposition: Condition stable  DC- Adult Discharges: All of the diagnostic tests were reviewed and questions answered. Diagnosis, care plan and treatment options were discussed. The patient understands the instructions and will follow up as directed. The patients results have been reviewed with them.   They have been counseled regarding their diagnosis. The patient verbally convey understanding and agreement of the signs, symptoms, diagnosis, treatment and prognosis and additionally agrees to follow up as recommended with their PCP in 24 - 48 hours. They also agree with the care-plan and convey that all of their questions have been answered. I have also put together some discharge instructions for them that include: 1) educational information regarding their diagnosis, 2) how to care for their diagnosis at home, as well a 3) list of reasons why they would want to return to the ED prior to their follow-up appointment, should their condition change. DC-The patient was given verbal wound care contusion instructions    Discharged    DISCHARGE PLAN:  1. Current Discharge Medication List      CONTINUE these medications which have NOT CHANGED    Details   !! isosorbide mononitrate ER (IMDUR) 30 mg tablet TAKE ONE TABLET BY MOUTH ONCE DAILY  Qty: 30 Tab, Refills: 12    Comments: Please consider 90 day supplies to promote better adherence  Associated Diagnoses: Essential hypertension      atorvastatin (LIPITOR) 20 mg tablet Take 1 tablet by mouth once daily  Qty: 90 Tab, Refills: 3      glipiZIDE SR (GLUCOTROL XL) 10 mg CR tablet Take 1 tablet by mouth once daily  Qty: 90 Tab, Refills: 3    Associated Diagnoses: Type II diabetes mellitus with nephropathy ([facility] [facility]      ! ! colchicine 0.6 mg tablet TAKE 1 TABLET BY MOUTH DAILY AS NEEDED FOR  GOUT  Qty: 30 Tab, Refills: 12    Associated Diagnoses: Chronic gout involving toe, unspecified cause, unspecified laterality      ipratropium (ATROVENT) 42 mcg (0.06 %) nasal spray 2 Sprays by Both Nostrils route four (4) times daily. Qty: 15 mL, Refills: 3      diclofenac (VOLTAREN) 1 % gel Apply  to affected area four (4) times daily.   Qty: 100 g, Refills: 12      amLODIPine (NORVASC) 5 mg tablet Take 1 tablet by mouth once daily  Qty: 30 Tab, Refills: 0    Associated Diagnoses: Essential hypertension      atenoloL (TENORMIN) 100 mg tablet Take 1/2 (one-half) tablet by mouth once daily  Qty: 30 Tab, Refills: 0    Associated Diagnoses: Essential hypertension      !! fluticasone propionate (FLONASE) 50 mcg/actuation nasal spray 2 Sprays by Both Nostrils route daily. Qty: 1 Bottle, Refills: 12      !! isosorbide mononitrate ER (IMDUR) 30 mg tablet Take 1 tablet by mouth once daily  Qty: 90 Tab, Refills: 0    Associated Diagnoses: Essential hypertension      sodium chloride (OCEAN) 0.65 % nasal squeeze bottle 0.1 mL by Both Nostrils route as needed for Congestion. And dryness. Qty: 44 mL, Refills: 3    Associated Diagnoses: Pharyngitis, unspecified etiology; Rhinitis, unspecified type      !! fluticasone propionate (FLONASE) 50 mcg/actuation nasal spray 2 Sprays by Both Nostrils route daily. Qty: 1 Bottle, Refills: 12    Associated Diagnoses: Pharyngitis, unspecified etiology; Rhinitis, unspecified type      butalbital-acetaminophen-caff (FIORICET) -40 mg per capsule Take 1 Cap by mouth every four (4) hours as needed for Pain. Indications: Tension Headache  Qty: 20 Cap, Refills: 0      allopurinol (ZYLOPRIM) 100 mg tablet TAKE ONE TABLET BY MOUTH ONCE DAILY  Qty: 90 Tab, Refills: 1    Associated Diagnoses: Chronic gout without tophus, unspecified cause, unspecified site      sucroferric oxyhydroxide (VELPHORO) 500 mg chew chewable tablet Take  by mouth three (3) times daily (with meals). guaiFENesin-codeine (CHERATUSSIN AC) 100-10 mg/5 mL solution Take 5 mL by mouth four (4) times daily as needed for Cough. Max Daily Amount: 20 mL. Qty: 240 mL, Refills: 0    Associated Diagnoses: Acute bronchitis, unspecified organism      zolpidem (AMBIEN) 5 mg tablet Take 1 Tab by mouth nightly as needed for Sleep. Max Daily Amount: 5 mg.   Qty: 30 Tab, Refills: 0    Associated Diagnoses: Primary insomnia      !! colchicine 0.6 mg tablet TAKE ONE TABLET BY MOUTH ONCE DAILY AS NEEDED FOR  GOUT  Qty: 30 Tab, Refills: 6    Comments: Please consider 90 day supplies to promote better adherence      losartan (COZAAR) 100 mg tablet TAKE ONE TABLET BY MOUTH ONCE DAILY  Qty: 30 Tab, Refills: 12    Comments: Please consider 90 day supplies to promote better adherence      b complex-vitamin c-folic acid (NEPHROCAPS) 1 mg capsule Take 1 Cap by mouth daily. Associated Diagnoses: ESRD (end stage renal disease) ([facility])      dronedarone (MULTAQ) tab tablet TAKE ONE TABLET BY MOUTH TWICE DAILY WITH MEALS  Qty: 60 Tab, Refills: 12    Associated Diagnoses: Essential hypertension      !! sevelamer carbonate (RENVELA) 800 mg tab tab Take 1,600 mg by mouth three (3) times daily. glipiZIDE (GLUCOTROL) 5 mg tablet Take 1 Tab by mouth daily. Qty: 30 Tab, Refills: 12    Associated Diagnoses: Type II diabetes mellitus with nephropathy ([address]      ! ! sevelamer carbonate (RENVELA) 800 mg tab tab Take  by mouth three (3) times daily. Associated Diagnoses: CKD (chronic kidney disease) stage V requiring chronic dialysis ([facility])      acetaminophen (TYLENOL EXTRA STRENGTH) 500 mg tablet Take  by mouth every six (6) hours as needed for Pain. calcium acetate (PHOSLO) 667 mg cap       polyethylene glycol (MIRALAX) 17 gram packet Take 1 Packet by mouth daily. Qty: 30 Packet, Refills: 2       !! - Potential duplicate medications found. Please discuss with provider. 2.   Follow-up Information     Follow up With Specialties Details Why Shereen Lopez MD Internal Medicine Call in 2 days As needed, If symptoms worsen [facility]  [phone]          3. Return to ED if worse   4. Current Discharge Medication List            Diagnosis     Clinical Impression:   1. Contusion of scalp, initial encounter        Attestations:    Nancy Turk MD    Please note that this dictation was completed with [employer], the [employer] voice recognition software.   Quite often unanticipated grammatical, syntax, homophones, and other interpretive errors are inadvertently transcribed by the computer software. Please disregard these errors. Please excuse any errors that have escaped final proofreading. Thank you.

## 2020-12-04 NOTE — PROGRESS NOTES
Patient's daughter called and informed of NEGATIVE COVID-19 as well as NEGATIVE influenza results over the phone.    KYLE Rueda

## 2020-12-04 NOTE — ED NOTES
pts caregiver/daughter, Yamile Best, verbalized understanding of d/c instructions, including where to get new medications and what they are for. pts IV was removed. Pt taken to private vehicle via wheelchair where personal aid helped get her in the car. Daughter was given a work note.

## 2020-12-08 NOTE — PROGRESS NOTES
Discussed positive blood cultures with family member over the phone. Encouraged them to bring patient back to the ED in light of recent positive blood cultures. They state that patient is currently on a Z-Jayro and is doing much better and will bring patient in if they feel it they need to.

## 2020-12-23 NOTE — PROGRESS NOTES
Identified pt with two pt identifiers(name and ). Reviewed record in preparation for visit and have obtained necessary documentation. Chief Complaint Patient presents with  
 Other  
  behavioral changes Health Maintenance Due Topic  Hepatitis C Screening  Lipid Screen  DTaP/Tdap/Td series (1 - Tdap)  Shingrix Vaccine Age 50> (1 of 2)  Colorectal Cancer Screening Combo  Breast Cancer Screen Mammogram   
 Medicare Yearly Exam   
 GLAUCOMA SCREENING Q2Y  Bone Densitometry (Dexa) Screening  Pneumococcal 65+ years (1 of 1 - PPSV23)  Pneumococcal 65+ yrs at Risk Vaccine (1 of 2 - PCV13)  Flu Vaccine (1) There were no vitals taken for this visit. Coordination of Care Questionnaire: 
:  
1) Have you been to an emergency room, urgent care, or hospitalized since your last visit? YES 
 
 
2. Have seen or consulted any other health care provider since your last visit? [facility] Patient is accompanied by  I have received verbal consent from Billy Argueta to discuss any/all medical information while they are present in the room.

## 2020-12-23 NOTE — PROGRESS NOTES
Anival Blanca is a 76 y.o. female who was seen by synchronous (real-time) audio-video technology on 12/23/2020 for Other (behavioral changes) Assessment & Plan:  
Diagnoses and all orders for this visit: 1. Cognitive and behavioral changes 
 -worsening, advised daughter that patient needed to be seen today and advised to go to ER or urgent care. Daughter states patient going to dialysis now and she will see what they say. Declined ER 2. CKD (chronic kidney disease) requiring chronic dialysis ([facility] 3. Lumbosacral spondylosis without myelopathy 4. Vascular dementia with depressed mood ([address] 5. Hemiplegia following cerebrovascular accident (CVA) ([facility] -given letter/order for stretcher tranfer for every transfer from now on. Patient was dropped a second time from wheelchair when transferring. Faxed letter to daughter's office as requested. 6. Wheelchair bound 7. Gastrostomy tube dependent ([facility] Daughter asking about new bed for patient and advised that the evaluator should send the evaluation paperwork to office to be signed. Daughter also inquiring about hospice care is initiated. Advised daughter to make an apt here. I spent at least 25 minutes on this visit with this established patient. Subjective:  
VV today for behavioral changes noticed by daughter Barbara Hayden. She has been staring off into space started today on a regular basis. Daughter states, not moving her neck and stiffening up. Eyes are different color, normally brown and 
 
2 weeks ago she was dropped out of wheelchair again and was taken to the ER and did CT of head and other scans ER found infection through blood work and was put on 
levofloxacin per daughter Missed dialysis on Monday due to transportation missing it Daughter states she needs to new bed and that someone has come out to assess patient for the new bed but they have never gotten a new bed. Daughter wants patient transferred in stretcher instead of wheelchair due to being dropped twice. Fax to job:  [phone] ATTN:  Duncan Vuong Prior to Admission medications Medication Sig Start Date End Date Taking? Authorizing Provider  
metoprolol tartrate (LOPRESSOR) 50 mg tablet Take 1 tablet by mouth twice daily 12/7/20  Yes Dillan Tomlin NP  
atorvastatin (LIPITOR) 40 mg tablet Take 1 tablet by mouth once daily 12/7/20  Yes Dillan Tomlin NP  
clopidogreL (PLAVIX) 75 mg tab Take 1 tablet by mouth once daily 12/7/20  Yes Marguerite Montague NP  
amiodarone (CORDARONE) 200 mg tablet Take 1 tablet by mouth twice daily 12/4/20  Yes Cindy Renae MD  
traZODone (DESYREL) 50 mg tablet TAKE 1 TAB PER G TUBE AT BEDTIME 11/9/20  Yes Marivel Iglesias MD  
LORazepam (ATIVAN) 0.5 mg tablet Take 1 Tab by mouth every Monday, Wednesday, Friday. Max Daily Amount: 0.5 mg. Prior to dialysis. 11/9/20  Yes Marivel Iglesias MD  
ondansetron hcl (Zofran) 4 mg tablet Take 1 Tab by mouth every six (6) hours as needed for Nausea or Nausea or Vomiting. 7/23/20  Yes Cindy Renae MD  
OTHER,NON-FORMULARY, 1 hospital bed mattress. 7/6/20  Yes Marivel Iglesias MD  
OTHER,NON-FORMULARY, 1 tub transfer bed. 7/6/20  Yes Marivel Iglesias MD  
aspirin 81 mg chewable tablet Take 81 mg by mouth daily. Yes Provider, Historical  
Bedside Commode XX 1 3-in-1 bedside commode. Fax to [phone] 7/6/20   Marivel Iglesias MD  
Nut. Tx. Impaired Renal Fxn,Soy (Nepro Carb Steady) 0.08 gram-1.8 kcal/mL liqd 4 oz by Gastrostomy Tube route five (5) times daily. 6/23/20   Marivel Iglesias MD  
nystatin (MYCOSTATIN) powder Apply  to affected area four (4) times daily. 6/17/20   Marivel Iglesias MD  
lactobacillus-acidophilus (LACTINEX) 100 million cell grpk Take 1 Packet by mouth two (2) times a day.  5/12/20   Dillan Tomlin, NP  
 cholecalciferol (VITAMIN D3) (50,000 UNITS /1250 MCG) capsule Take 1 capsule every Monday and Friday 5/7/20   Dewayne PIEDRA NP  
ferrous sulfate 325 mg (65 mg iron) tablet Take 1 Tab by mouth two (2) times a day. 5/7/20   Konstantin Montague NP  
pantoprazole (Protonix) 40 mg granules for oral suspension Take 40 mg by mouth daily. 5/7/20   Arletta Nurse, LOVE  
NUT. TX.IMPAIRED RENAL FXN,SOY (NEPRO PO) Take  by mouth. 1.5 CANS VIA FEEDING TUBE EVERY 4 HOURS PER DAUGHTER    Provider, Historical  
 
Patient Active Problem List  
Diagnosis Code  Vascular dementia with depressed mood ([facility]) F01.51, F32.9  
 Urinary incontinence R32  Insomnia G47.00  
 CKD (chronic kidney disease) requiring chronic dialysis ([facility]) N18.6, Z99.2  Hypertension I10  Lumbosacral spondylosis without myelopathy M47.817  Hemiplegia following cerebrovascular accident (CVA) [facility]) B90.015  Gastrostomy tube dependent ([facility] Z93.1  Anemia of chronic disease D63.8  Iron deficiency anemia D50.9  Dysphagia R13.10  Anemia D64.9 Review of Systems Constitutional:  
     Patient in wheelchair, moaning out loud, hanging head to right side, eyes not really open Objective:  
 
Patient-Reported Vitals 12/23/2020 Patient-Reported Weight -  
Patient-Reported Pulse 101 Patient-Reported Temperature 97.4 Patient-Reported Systolic  197 Patient-Reported Diastolic 97  
  
 
[INSTRUCTIONS:  \"[x]\" Indicates a positive item  \"[]\" Indicates a negative item  -- DELETE ALL ITEMS NOT EXAMINED] Constitutional: [x] Appears well-developed and well-nourished [x] No apparent distress   
  [] Abnormal - Mental status: [x] Alert and awake  [x] Oriented to person/place/time [x] Able to follow commands   
[] Abnormal - Eyes:   EOM    [x]  Normal    [] Abnormal -  
Sclera  [x]  Normal    [] Abnormal - 
        Discharge [x]  None visible   [] Abnormal - HENT: [x] Normocephalic, atraumatic  [] Abnormal -  
 [x] Mouth/Throat: Mucous membranes are moist 
 
External Ears [x] Normal  [] Abnormal - Neck: [x] No visualized mass [] Abnormal - Pulmonary/Chest: [x] Respiratory effort normal   [x] No visualized signs of difficulty breathing or respiratory distress 
      [] Abnormal - Musculoskeletal:   [x] Normal gait with no signs of ataxia [x] Normal range of motion of neck [] Abnormal -  
 
Neurological:        [x] No Facial Asymmetry (Cranial nerve 7 motor function) (limited exam due to video visit) [x] No gaze palsy  
     [] Abnormal -   
     
Skin:        [x] No significant exanthematous lesions or discoloration noted on facial skin   
     [] Abnormal - Psychiatric:       [x] Normal Affect [] Abnormal -  
     [x] No Hallucinations Other pertinent observable physical exam findings:- 
 
 
 
We discussed the expected course, resolution and complications of the diagnosis(es) in detail. Medication risks, benefits, costs, interactions, and alternatives were discussed as indicated. I advised her to contact the office if her condition worsens, changes or fails to improve as anticipated. She expressed understanding with the diagnosis(es) and plan. Margaret Corcoran, who was evaluated through a patient-initiated, synchronous (real-time) audio-video encounter, and/or her healthcare decision maker, is aware that it is a billable service, with coverage as determined by her insurance carrier. She provided verbal consent to proceed: Yes, and patient identification was verified. It was conducted pursuant to the emergency declaration under the [address] [place] [address], [address] [place] [address] authority and the [employer] Act. A caregiver was present when appropriate. Ability to conduct physical exam was limited. I was at the office. The patient was at home.  
 
 
Yadiel Cook NP

## 2021-01-01 ENCOUNTER — APPOINTMENT (OUTPATIENT)
Dept: NON INVASIVE DIAGNOSTICS | Age: 66
DRG: 871 | End: 2021-01-01
Attending: INTERNAL MEDICINE
Payer: MEDICARE

## 2021-01-01 ENCOUNTER — APPOINTMENT (OUTPATIENT)
Dept: GENERAL RADIOLOGY | Age: 66
DRG: 871 | End: 2021-01-01
Attending: INTERNAL MEDICINE
Payer: MEDICARE

## 2021-01-01 ENCOUNTER — OFFICE VISIT (OUTPATIENT)
Dept: FAMILY MEDICINE CLINIC | Age: 66
End: 2021-01-01
Payer: MEDICARE

## 2021-01-01 ENCOUNTER — APPOINTMENT (OUTPATIENT)
Dept: GENERAL RADIOLOGY | Age: 66
DRG: 871 | End: 2021-01-01
Attending: EMERGENCY MEDICINE
Payer: MEDICARE

## 2021-01-01 ENCOUNTER — HOSPITAL ENCOUNTER (INPATIENT)
Age: 66
LOS: 5 days | DRG: 871 | End: 2021-01-11
Attending: EMERGENCY MEDICINE | Admitting: INTERNAL MEDICINE
Payer: MEDICARE

## 2021-01-01 VITALS
WEIGHT: 86 LBS | DIASTOLIC BLOOD PRESSURE: 77 MMHG | TEMPERATURE: 97.7 F | SYSTOLIC BLOOD PRESSURE: 119 MMHG | HEART RATE: 92 BPM | RESPIRATION RATE: 16 BRPM | HEIGHT: 64 IN | BODY MASS INDEX: 14.68 KG/M2 | OXYGEN SATURATION: 98 %

## 2021-01-01 VITALS
WEIGHT: 96.12 LBS | BODY MASS INDEX: 16.01 KG/M2 | RESPIRATION RATE: 20 BRPM | TEMPERATURE: 98.3 F | OXYGEN SATURATION: 93 % | SYSTOLIC BLOOD PRESSURE: 132 MMHG | HEIGHT: 65 IN | DIASTOLIC BLOOD PRESSURE: 82 MMHG | HEART RATE: 137 BPM

## 2021-01-01 DIAGNOSIS — T68.XXXA HYPOTHERMIA, INITIAL ENCOUNTER: ICD-10-CM

## 2021-01-01 DIAGNOSIS — F01.53 VASCULAR DEMENTIA WITH DEPRESSED MOOD: ICD-10-CM

## 2021-01-01 DIAGNOSIS — N18.6 CKD (CHRONIC KIDNEY DISEASE) REQUIRING CHRONIC DIALYSIS (HCC): ICD-10-CM

## 2021-01-01 DIAGNOSIS — J69.0 ASPIRATION PNEUMONIA, UNSPECIFIED ASPIRATION PNEUMONIA TYPE, UNSPECIFIED LATERALITY, UNSPECIFIED PART OF LUNG (HCC): ICD-10-CM

## 2021-01-01 DIAGNOSIS — Z86.73 HISTORY OF STROKE: ICD-10-CM

## 2021-01-01 DIAGNOSIS — I95.9 HYPOTENSION, UNSPECIFIED HYPOTENSION TYPE: ICD-10-CM

## 2021-01-01 DIAGNOSIS — D64.9 ANEMIA, UNSPECIFIED TYPE: ICD-10-CM

## 2021-01-01 DIAGNOSIS — R40.4 TRANSIENT ALTERATION OF AWARENESS: ICD-10-CM

## 2021-01-01 DIAGNOSIS — R13.10 DYSPHAGIA, UNSPECIFIED TYPE: ICD-10-CM

## 2021-01-01 DIAGNOSIS — D63.8 ANEMIA OF CHRONIC DISEASE: ICD-10-CM

## 2021-01-01 DIAGNOSIS — K02.9 DENTAL CARIES: ICD-10-CM

## 2021-01-01 DIAGNOSIS — K94.22 INFECTION OF PEG SITE (HCC): Primary | ICD-10-CM

## 2021-01-01 DIAGNOSIS — Z93.1 GASTROSTOMY TUBE DEPENDENT (HCC): ICD-10-CM

## 2021-01-01 DIAGNOSIS — Z99.2 CKD (CHRONIC KIDNEY DISEASE) REQUIRING CHRONIC DIALYSIS (HCC): ICD-10-CM

## 2021-01-01 DIAGNOSIS — R41.0 DELIRIUM: Primary | ICD-10-CM

## 2021-01-01 DIAGNOSIS — E78.2 MIXED HYPERLIPIDEMIA: ICD-10-CM

## 2021-01-01 DIAGNOSIS — A41.9 SEPSIS, DUE TO UNSPECIFIED ORGANISM, UNSPECIFIED WHETHER ACUTE ORGAN DYSFUNCTION PRESENT (HCC): ICD-10-CM

## 2021-01-01 DIAGNOSIS — R00.0 TACHYCARDIA: ICD-10-CM

## 2021-01-01 DIAGNOSIS — I33.0 ACUTE BACTERIAL ENDOCARDITIS: ICD-10-CM

## 2021-01-01 LAB
ABO + RH BLD: NORMAL
ALBUMIN SERPL-MCNC: 1.8 G/DL (ref 3.5–5)
ALBUMIN/GLOB SERPL: 0.4 {RATIO} (ref 1.1–2.2)
ALP SERPL-CCNC: 197 U/L (ref 45–117)
ALT SERPL-CCNC: 66 U/L (ref 12–78)
ANION GAP SERPL CALC-SCNC: 10 MMOL/L (ref 5–15)
ANION GAP SERPL CALC-SCNC: 11 MMOL/L (ref 5–15)
AST SERPL W P-5'-P-CCNC: 105 U/L (ref 15–37)
ATRIAL RATE: 115 BPM
BACTERIA SPEC CULT: ABNORMAL
BACTERIA SPEC CULT: ABNORMAL
BACTERIA SPEC CULT: NORMAL
BASOPHILS # BLD: 0 K/UL (ref 0–0.1)
BASOPHILS # BLD: 0 K/UL (ref 0–0.1)
BASOPHILS NFR BLD: 0 % (ref 0–1)
BASOPHILS NFR BLD: 0 % (ref 0–1)
BILIRUB SERPL-MCNC: 0.8 MG/DL (ref 0.2–1)
BLD PROD TYP BPU: NORMAL
BLOOD GROUP ANTIBODIES SERPL: NEGATIVE
BPU ID: NORMAL
BUN SERPL-MCNC: 44 MG/DL (ref 6–20)
BUN SERPL-MCNC: 56 MG/DL (ref 6–20)
BUN/CREAT SERPL: 11 (ref 12–20)
BUN/CREAT SERPL: 13 (ref 12–20)
CA-I BLD-MCNC: 8.1 MG/DL (ref 8.5–10.1)
CA-I BLD-MCNC: 8.2 MG/DL (ref 8.5–10.1)
CALCULATED P AXIS, ECG09: -12 DEGREES
CALCULATED R AXIS, ECG10: 63 DEGREES
CALCULATED T AXIS, ECG11: 74 DEGREES
CHLORIDE SERPL-SCNC: 100 MMOL/L (ref 97–108)
CHLORIDE SERPL-SCNC: 96 MMOL/L (ref 97–108)
CO2 SERPL-SCNC: 24 MMOL/L (ref 21–32)
CO2 SERPL-SCNC: 26 MMOL/L (ref 21–32)
COVID-19 RAPID TEST, COVR: NOT DETECTED
CREAT SERPL-MCNC: 3.91 MG/DL (ref 0.55–1.02)
CREAT SERPL-MCNC: 4.39 MG/DL (ref 0.55–1.02)
CROSSMATCH RESULT,%XM: NORMAL
CRP SERPL-MCNC: 14.7 MG/DL (ref 0–0.6)
DATE LAST DOSE: NORMAL
DIAGNOSIS, 93000: NORMAL
DIFFERENTIAL METHOD BLD: ABNORMAL
DIFFERENTIAL METHOD BLD: ABNORMAL
ECHO AV AREA PEAK VELOCITY: 2.22 CM2
ECHO AV AREA/BSA PEAK VELOCITY: 1.6 CM2/M2
ECHO AV PEAK GRADIENT: 7 MMHG
ECHO EST RA PRESSURE: 3 MMHG
ECHO LV E' SEPTAL VELOCITY: 5.65 CM/S
ECHO LV EDV A2C: 56.2 CM3
ECHO LV EJECTION FRACTION A2C: 40 %
ECHO LV EJECTION FRACTION BIPLANE: 71 % (ref 55–100)
ECHO LV ESV A2C: 12.3 CM3
ECHO LV INTERNAL DIMENSION DIASTOLIC: 3.83 CM (ref 3.9–5.3)
ECHO LV INTERNAL DIMENSION SYSTOLIC: 2.31 CM
ECHO LV IVSD: 1.29 CM (ref 0.6–0.9)
ECHO LV MASS 2D: 162.9 G (ref 67–162)
ECHO LV MASS INDEX 2D: 115.6 G/M2 (ref 43–95)
ECHO LV POSTERIOR WALL DIASTOLIC: 1.19 CM (ref 0.6–0.9)
ECHO LVOT DIAM: 1.8 CM
ECHO LVOT PEAK GRADIENT: 5 MMHG
ECHO MV A VELOCITY: 125 CM/S
ECHO MV AREA PHT: 5 CM2
ECHO MV E DECELERATION TIME (DT): 127 MS
ECHO MV E VELOCITY: 178 CM/S
ECHO MV E/A RATIO: 1.42
ECHO MV E/E' SEPTAL: 31.5
ECHO MV PRESSURE HALF TIME (PHT): 44 MS
ECHO PV PEAK INSTANTANEOUS GRADIENT SYSTOLIC: 12 MMHG
ECHO PV PEAK INSTANTANEOUS GRADIENT SYSTOLIC: 3 MMHG
ECHO PV REGURGITANT MAX VELOCITY: 112 CM/S
ECHO PV REGURGITANT MAX VELOCITY: 128 CM/S
ECHO PV REGURGITANT MAX VELOCITY: 175 CM/S
ECHO PV REGURGITANT MAX VELOCITY: 629 CM/S
ECHO PV REGURGITANT MAX VELOCITY: 84.5 CM/S
ECHO RIGHT VENTRICULAR SYSTOLIC PRESSURE (RVSP): 43 MMHG
ECHO RV INTERNAL DIMENSION: 3.49 CM
ECHO TV MAX VELOCITY: 317 CM/S
ECHO TV REGURGITANT PEAK GRADIENT: 40 MMHG
EOSINOPHIL # BLD: 0 K/UL (ref 0–0.4)
EOSINOPHIL # BLD: 0 K/UL (ref 0–0.4)
EOSINOPHIL NFR BLD: 0 % (ref 0–7)
EOSINOPHIL NFR BLD: 0 % (ref 0–7)
ERYTHROCYTE [DISTWIDTH] IN BLOOD BY AUTOMATED COUNT: 21.5 % (ref 11.5–14.5)
ERYTHROCYTE [DISTWIDTH] IN BLOOD BY AUTOMATED COUNT: 21.6 % (ref 11.5–14.5)
ERYTHROCYTE [DISTWIDTH] IN BLOOD BY AUTOMATED COUNT: 22.1 % (ref 11.5–14.5)
FLUAV AG NPH QL IA: NEGATIVE
FLUBV AG NOSE QL IA: NEGATIVE
GLOBULIN SER CALC-MCNC: 4.1 G/DL (ref 2–4)
GLUCOSE SERPL-MCNC: 104 MG/DL (ref 65–100)
GLUCOSE SERPL-MCNC: 137 MG/DL (ref 65–100)
HCT VFR BLD AUTO: 19.7 % (ref 35–47)
HCT VFR BLD AUTO: 25.1 % (ref 35–47)
HCT VFR BLD AUTO: 26.5 % (ref 35–47)
HGB BLD-MCNC: 6.2 G/DL (ref 11.5–16)
HGB BLD-MCNC: 8.4 G/DL (ref 11.5–16)
HGB BLD-MCNC: 8.6 G/DL (ref 11.5–16)
IMM GRANULOCYTES # BLD AUTO: 0 K/UL (ref 0–0.04)
IMM GRANULOCYTES # BLD AUTO: 0.1 K/UL (ref 0–0.04)
IMM GRANULOCYTES NFR BLD AUTO: 0 % (ref 0–0.5)
IMM GRANULOCYTES NFR BLD AUTO: 1 % (ref 0–0.5)
INR PPP: 1.5 (ref 0.9–1.1)
LACTATE SERPL-SCNC: 3.1 MMOL/L (ref 0.4–2)
LYMPHOCYTES # BLD: 0.5 K/UL (ref 0.8–3.5)
LYMPHOCYTES # BLD: 0.7 K/UL (ref 0.8–3.5)
LYMPHOCYTES NFR BLD: 2 % (ref 12–49)
LYMPHOCYTES NFR BLD: 2 % (ref 12–49)
MAGNESIUM SERPL-MCNC: 2.5 MG/DL (ref 1.6–2.4)
MCH RBC QN AUTO: 23.5 PG (ref 26–34)
MCH RBC QN AUTO: 24.8 PG (ref 26–34)
MCH RBC QN AUTO: 26.9 PG (ref 26–34)
MCHC RBC AUTO-ENTMCNC: 31.5 G/DL (ref 30–36.5)
MCHC RBC AUTO-ENTMCNC: 31.7 G/DL (ref 30–36.5)
MCHC RBC AUTO-ENTMCNC: 34.3 G/DL (ref 30–36.5)
MCV RBC AUTO: 74.6 FL (ref 80–99)
MCV RBC AUTO: 78.2 FL (ref 80–99)
MCV RBC AUTO: 78.4 FL (ref 80–99)
MONOCYTES # BLD: 1 K/UL (ref 0–1)
MONOCYTES # BLD: 2 K/UL (ref 0–1)
MONOCYTES NFR BLD: 4 % (ref 5–13)
MONOCYTES NFR BLD: 8 % (ref 5–13)
MV DEC SLOPE: ABNORMAL MM/S2
MV DEC SLOPE: ABNORMAL MM/S2
NEUTS BAND NFR BLD MANUAL: 1 % (ref 0–6)
NEUTS SEG # BLD: 22.6 K/UL (ref 1.8–8)
NEUTS SEG # BLD: 26.1 K/UL (ref 1.8–8)
NEUTS SEG NFR BLD: 89 % (ref 32–75)
NEUTS SEG NFR BLD: 93 % (ref 32–75)
P-R INTERVAL, ECG05: 138 MS
PLATELET # BLD AUTO: 56 K/UL (ref 150–400)
PLATELET # BLD AUTO: 70 K/UL (ref 150–400)
PLATELET # BLD AUTO: 70 K/UL (ref 150–400)
POTASSIUM SERPL-SCNC: 3.9 MMOL/L (ref 3.5–5.1)
POTASSIUM SERPL-SCNC: 4.5 MMOL/L (ref 3.5–5.1)
PROCALCITONIN SERPL-MCNC: 17 NG/ML
PROCALCITONIN SERPL-MCNC: 20.24 NG/ML
PROT SERPL-MCNC: 5.9 G/DL (ref 6.4–8.2)
PROTHROMBIN TIME: 18.2 SEC (ref 11.9–14.7)
Q-T INTERVAL, ECG07: 368 MS
QRS DURATION, ECG06: 74 MS
QTC CALCULATION (BEZET), ECG08: 509 MS
RBC # BLD AUTO: 2.64 M/UL (ref 3.8–5.2)
RBC # BLD AUTO: 3.2 M/UL (ref 3.8–5.2)
RBC # BLD AUTO: 3.39 M/UL (ref 3.8–5.2)
RBC MORPH BLD: ABNORMAL
RBC MORPH BLD: ABNORMAL
REPORTED DOSE,DOSE: NORMAL UNITS
SARS-COV-2, COV2: NORMAL
SODIUM SERPL-SCNC: 133 MMOL/L (ref 136–145)
SODIUM SERPL-SCNC: 134 MMOL/L (ref 136–145)
SPECIAL REQUESTS,SREQ: ABNORMAL
SPECIAL REQUESTS,SREQ: NORMAL
SPECIMEN EXP DATE BLD: NORMAL
SPECIMEN SOURCE: NORMAL
STATUS OF UNIT,%ST: NORMAL
TRANSFUSION STATUS PATIENT QL: NORMAL
TROPONIN I SERPL-MCNC: 1.67 NG/ML
TROPONIN I SERPL-MCNC: 1.97 NG/ML
UNIT DIVISION, %UDIV: 0
VANCOMYCIN SERPL-MCNC: 28.2 UG/ML
VENTRICULAR RATE, ECG03: 115 BPM
WBC # BLD AUTO: 25.1 K/UL (ref 3.6–11)
WBC # BLD AUTO: 27.5 K/UL (ref 3.6–11)
WBC # BLD AUTO: 27.8 K/UL (ref 3.6–11)

## 2021-01-01 PROCEDURE — G9231 DOC ESRD DIA TRANS PREG: HCPCS | Performed by: FAMILY MEDICINE

## 2021-01-01 PROCEDURE — 85025 COMPLETE CBC W/AUTO DIFF WBC: CPT

## 2021-01-01 PROCEDURE — 87040 BLOOD CULTURE FOR BACTERIA: CPT

## 2021-01-01 PROCEDURE — 74011250637 HC RX REV CODE- 250/637: Performed by: NURSE PRACTITIONER

## 2021-01-01 PROCEDURE — 74011250636 HC RX REV CODE- 250/636: Performed by: NURSE PRACTITIONER

## 2021-01-01 PROCEDURE — 96374 THER/PROPH/DIAG INJ IV PUSH: CPT

## 2021-01-01 PROCEDURE — 74011250636 HC RX REV CODE- 250/636: Performed by: EMERGENCY MEDICINE

## 2021-01-01 PROCEDURE — 87635 SARS-COV-2 COVID-19 AMP PRB: CPT

## 2021-01-01 PROCEDURE — 36415 COLL VENOUS BLD VENIPUNCTURE: CPT

## 2021-01-01 PROCEDURE — 71045 X-RAY EXAM CHEST 1 VIEW: CPT

## 2021-01-01 PROCEDURE — 3017F COLORECTAL CA SCREEN DOC REV: CPT | Performed by: FAMILY MEDICINE

## 2021-01-01 PROCEDURE — 83605 ASSAY OF LACTIC ACID: CPT

## 2021-01-01 PROCEDURE — 84145 PROCALCITONIN (PCT): CPT

## 2021-01-01 PROCEDURE — 86900 BLOOD TYPING SEROLOGIC ABO: CPT

## 2021-01-01 PROCEDURE — 83735 ASSAY OF MAGNESIUM: CPT

## 2021-01-01 PROCEDURE — 99285 EMERGENCY DEPT VISIT HI MDM: CPT

## 2021-01-01 PROCEDURE — 85027 COMPLETE CBC AUTOMATED: CPT

## 2021-01-01 PROCEDURE — G8536 NO DOC ELDER MAL SCRN: HCPCS | Performed by: FAMILY MEDICINE

## 2021-01-01 PROCEDURE — 87186 SC STD MICRODIL/AGAR DIL: CPT

## 2021-01-01 PROCEDURE — 86140 C-REACTIVE PROTEIN: CPT

## 2021-01-01 PROCEDURE — 65270000029 HC RM PRIVATE

## 2021-01-01 PROCEDURE — P9016 RBC LEUKOCYTES REDUCED: HCPCS

## 2021-01-01 PROCEDURE — 84484 ASSAY OF TROPONIN QUANT: CPT

## 2021-01-01 PROCEDURE — 86923 COMPATIBILITY TEST ELECTRIC: CPT

## 2021-01-01 PROCEDURE — 74011250637 HC RX REV CODE- 250/637: Performed by: EMERGENCY MEDICINE

## 2021-01-01 PROCEDURE — 85610 PROTHROMBIN TIME: CPT

## 2021-01-01 PROCEDURE — 90935 HEMODIALYSIS ONE EVALUATION: CPT

## 2021-01-01 PROCEDURE — 99222 1ST HOSP IP/OBS MODERATE 55: CPT | Performed by: INTERNAL MEDICINE

## 2021-01-01 PROCEDURE — 74011250636 HC RX REV CODE- 250/636: Performed by: INTERNAL MEDICINE

## 2021-01-01 PROCEDURE — 99232 SBSQ HOSP IP/OBS MODERATE 35: CPT | Performed by: INTERNAL MEDICINE

## 2021-01-01 PROCEDURE — 87077 CULTURE AEROBIC IDENTIFY: CPT

## 2021-01-01 PROCEDURE — G8427 DOCREV CUR MEDS BY ELIG CLIN: HCPCS | Performed by: FAMILY MEDICINE

## 2021-01-01 PROCEDURE — G8400 PT W/DXA NO RESULTS DOC: HCPCS | Performed by: FAMILY MEDICINE

## 2021-01-01 PROCEDURE — 93005 ELECTROCARDIOGRAM TRACING: CPT

## 2021-01-01 PROCEDURE — 1101F PT FALLS ASSESS-DOCD LE1/YR: CPT | Performed by: FAMILY MEDICINE

## 2021-01-01 PROCEDURE — G8419 CALC BMI OUT NRM PARAM NOF/U: HCPCS | Performed by: FAMILY MEDICINE

## 2021-01-01 PROCEDURE — 36430 TRANSFUSION BLD/BLD COMPNT: CPT

## 2021-01-01 PROCEDURE — 80048 BASIC METABOLIC PNL TOTAL CA: CPT

## 2021-01-01 PROCEDURE — 96375 TX/PRO/DX INJ NEW DRUG ADDON: CPT

## 2021-01-01 PROCEDURE — 80053 COMPREHEN METABOLIC PANEL: CPT

## 2021-01-01 PROCEDURE — 93306 TTE W/DOPPLER COMPLETE: CPT

## 2021-01-01 PROCEDURE — 74011000250 HC RX REV CODE- 250: Performed by: EMERGENCY MEDICINE

## 2021-01-01 PROCEDURE — 74011000258 HC RX REV CODE- 258: Performed by: INTERNAL MEDICINE

## 2021-01-01 PROCEDURE — 30233N1 TRANSFUSION OF NONAUTOLOGOUS RED BLOOD CELLS INTO PERIPHERAL VEIN, PERCUTANEOUS APPROACH: ICD-10-PCS | Performed by: INTERNAL MEDICINE

## 2021-01-01 PROCEDURE — 1090F PRES/ABSN URINE INCON ASSESS: CPT | Performed by: FAMILY MEDICINE

## 2021-01-01 PROCEDURE — G9717 DOC PT DX DEP/BP F/U NT REQ: HCPCS | Performed by: FAMILY MEDICINE

## 2021-01-01 PROCEDURE — 99214 OFFICE O/P EST MOD 30 MIN: CPT | Performed by: FAMILY MEDICINE

## 2021-01-01 PROCEDURE — 87804 INFLUENZA ASSAY W/OPTIC: CPT

## 2021-01-01 PROCEDURE — 80202 ASSAY OF VANCOMYCIN: CPT

## 2021-01-01 PROCEDURE — 5A1D70Z PERFORMANCE OF URINARY FILTRATION, INTERMITTENT, LESS THAN 6 HOURS PER DAY: ICD-10-PCS | Performed by: INTERNAL MEDICINE

## 2021-01-01 RX ORDER — FUROSEMIDE 10 MG/ML
20 INJECTION INTRAMUSCULAR; INTRAVENOUS ONCE
Status: COMPLETED | OUTPATIENT
Start: 2021-01-01 | End: 2021-01-01

## 2021-01-01 RX ORDER — LORAZEPAM 2 MG/ML
1 CONCENTRATE ORAL
Status: DISCONTINUED | OUTPATIENT
Start: 2021-01-01 | End: 2021-01-01

## 2021-01-01 RX ORDER — AMIODARONE HYDROCHLORIDE 200 MG/1
200 TABLET ORAL DAILY
Status: DISCONTINUED | OUTPATIENT
Start: 2021-01-01 | End: 2021-01-01

## 2021-01-01 RX ORDER — ONDANSETRON 4 MG/1
4 TABLET, ORALLY DISINTEGRATING ORAL
Status: DISCONTINUED | OUTPATIENT
Start: 2021-01-01 | End: 2021-01-01 | Stop reason: HOSPADM

## 2021-01-01 RX ORDER — ATORVASTATIN CALCIUM 40 MG/1
40 TABLET, FILM COATED ORAL
Status: DISCONTINUED | OUTPATIENT
Start: 2021-01-01 | End: 2021-01-01

## 2021-01-01 RX ORDER — HEPARIN SODIUM 1000 [USP'U]/ML
INJECTION, SOLUTION INTRAVENOUS; SUBCUTANEOUS
Status: DISCONTINUED
Start: 2021-01-01 | End: 2021-01-01

## 2021-01-01 RX ORDER — ACETAMINOPHEN 325 MG/1
650 TABLET ORAL
Status: DISCONTINUED | OUTPATIENT
Start: 2021-01-01 | End: 2021-01-01 | Stop reason: HOSPADM

## 2021-01-01 RX ORDER — ACETAMINOPHEN 650 MG/1
650 SUPPOSITORY RECTAL
Status: DISCONTINUED | OUTPATIENT
Start: 2021-01-01 | End: 2021-01-01 | Stop reason: HOSPADM

## 2021-01-01 RX ORDER — FERROUS SULFATE 300 MG/5ML
300 LIQUID (ML) ORAL 2 TIMES DAILY
Status: DISCONTINUED | OUTPATIENT
Start: 2021-01-01 | End: 2021-01-01

## 2021-01-01 RX ORDER — POLYETHYLENE GLYCOL 3350 17 G/17G
17 POWDER, FOR SOLUTION ORAL DAILY PRN
Status: DISCONTINUED | OUTPATIENT
Start: 2021-01-01 | End: 2021-01-01 | Stop reason: HOSPADM

## 2021-01-01 RX ORDER — MORPHINE SULFATE 2 MG/ML
2 INJECTION, SOLUTION INTRAMUSCULAR; INTRAVENOUS
Status: DISCONTINUED | OUTPATIENT
Start: 2021-01-01 | End: 2021-01-01 | Stop reason: HOSPADM

## 2021-01-01 RX ORDER — HALOPERIDOL 5 MG/ML
2 INJECTION INTRAMUSCULAR
Status: DISCONTINUED | OUTPATIENT
Start: 2021-01-01 | End: 2021-01-01 | Stop reason: HOSPADM

## 2021-01-01 RX ORDER — BISACODYL 5 MG
5 TABLET, DELAYED RELEASE (ENTERIC COATED) ORAL DAILY PRN
Status: DISCONTINUED | OUTPATIENT
Start: 2021-01-01 | End: 2021-01-01 | Stop reason: HOSPADM

## 2021-01-01 RX ORDER — PANTOPRAZOLE SODIUM 40 MG/1
40 GRANULE, DELAYED RELEASE ORAL DAILY
Status: DISCONTINUED | OUTPATIENT
Start: 2021-01-01 | End: 2021-01-01

## 2021-01-01 RX ORDER — SCOLOPAMINE TRANSDERMAL SYSTEM 1 MG/1
1 PATCH, EXTENDED RELEASE TRANSDERMAL
Status: DISCONTINUED | OUTPATIENT
Start: 2021-01-01 | End: 2021-01-01 | Stop reason: HOSPADM

## 2021-01-01 RX ORDER — HYDROCODONE BITARTRATE AND ACETAMINOPHEN 5; 325 MG/1; MG/1
1 TABLET ORAL
Status: COMPLETED | OUTPATIENT
Start: 2021-01-01 | End: 2021-01-01

## 2021-01-01 RX ORDER — LORAZEPAM 2 MG/ML
1 CONCENTRATE ORAL
Status: DISCONTINUED | OUTPATIENT
Start: 2021-01-01 | End: 2021-01-01 | Stop reason: HOSPADM

## 2021-01-01 RX ORDER — HALOPERIDOL 2 MG/ML
2 SOLUTION ORAL
Status: DISCONTINUED | OUTPATIENT
Start: 2021-01-01 | End: 2021-01-01 | Stop reason: HOSPADM

## 2021-01-01 RX ORDER — MORPHINE SULFATE 2 MG/ML
2 INJECTION, SOLUTION INTRAMUSCULAR; INTRAVENOUS
Status: DISCONTINUED | OUTPATIENT
Start: 2021-01-01 | End: 2021-01-01

## 2021-01-01 RX ORDER — SODIUM CHLORIDE 0.9 % (FLUSH) 0.9 %
5-10 SYRINGE (ML) INJECTION AS NEEDED
Status: DISCONTINUED | OUTPATIENT
Start: 2021-01-01 | End: 2021-01-01 | Stop reason: HOSPADM

## 2021-01-01 RX ORDER — FAMOTIDINE 20 MG/1
20 TABLET, FILM COATED ORAL EVERY 12 HOURS
Status: DISCONTINUED | OUTPATIENT
Start: 2021-01-01 | End: 2021-01-01

## 2021-01-01 RX ORDER — LANOLIN ALCOHOL/MO/W.PET/CERES
325 CREAM (GRAM) TOPICAL 2 TIMES DAILY
Status: DISCONTINUED | OUTPATIENT
Start: 2021-01-01 | End: 2021-01-01

## 2021-01-01 RX ORDER — SODIUM CHLORIDE 9 MG/ML
250 INJECTION, SOLUTION INTRAVENOUS AS NEEDED
Status: DISCONTINUED | OUTPATIENT
Start: 2021-01-01 | End: 2021-01-01

## 2021-01-01 RX ORDER — SODIUM CHLORIDE 0.9 % (FLUSH) 0.9 %
5-40 SYRINGE (ML) INJECTION EVERY 8 HOURS
Status: DISCONTINUED | OUTPATIENT
Start: 2021-01-01 | End: 2021-01-01

## 2021-01-01 RX ORDER — ONDANSETRON 2 MG/ML
4 INJECTION INTRAMUSCULAR; INTRAVENOUS
Status: DISCONTINUED | OUTPATIENT
Start: 2021-01-01 | End: 2021-01-01 | Stop reason: HOSPADM

## 2021-01-01 RX ORDER — SODIUM CHLORIDE 0.9 % (FLUSH) 0.9 %
5-40 SYRINGE (ML) INJECTION AS NEEDED
Status: DISCONTINUED | OUTPATIENT
Start: 2021-01-01 | End: 2021-01-01 | Stop reason: HOSPADM

## 2021-01-01 RX ORDER — AMOXICILLIN AND CLAVULANATE POTASSIUM 600; 42.9 MG/5ML; MG/5ML
6.75 POWDER, FOR SUSPENSION ORAL 2 TIMES DAILY
Qty: 140 ML | Refills: 0 | Status: SHIPPED | OUTPATIENT
Start: 2021-01-01 | End: 2021-01-14

## 2021-01-01 RX ORDER — SODIUM CHLORIDE 9 MG/ML
125 INJECTION, SOLUTION INTRAVENOUS CONTINUOUS
Status: DISCONTINUED | OUTPATIENT
Start: 2021-01-01 | End: 2021-01-01

## 2021-01-01 RX ORDER — GUAIFENESIN 100 MG/5ML
81 LIQUID (ML) ORAL DAILY
Status: DISCONTINUED | OUTPATIENT
Start: 2021-01-01 | End: 2021-01-01

## 2021-01-01 RX ORDER — CLOPIDOGREL BISULFATE 75 MG/1
75 TABLET ORAL DAILY
Status: DISCONTINUED | OUTPATIENT
Start: 2021-01-01 | End: 2021-01-01

## 2021-01-01 RX ADMIN — MORPHINE SULFATE 2 MG: 2 INJECTION, SOLUTION INTRAMUSCULAR; INTRAVENOUS at 02:59

## 2021-01-01 RX ADMIN — MINERAL SUPPLEMENT IRON 300 MG / 5 ML STRENGTH LIQUID 100 PER BOX UNFLAVORED 300 MG: at 01:34

## 2021-01-01 RX ADMIN — MORPHINE SULFATE 2 MG: 2 INJECTION, SOLUTION INTRAMUSCULAR; INTRAVENOUS at 06:49

## 2021-01-01 RX ADMIN — CEFEPIME HYDROCHLORIDE 2 G: 2 INJECTION, POWDER, FOR SOLUTION INTRAVENOUS at 10:44

## 2021-01-01 RX ADMIN — Medication 10 ML: at 01:34

## 2021-01-01 RX ADMIN — FUROSEMIDE 20 MG: 10 INJECTION, SOLUTION INTRAMUSCULAR; INTRAVENOUS at 19:22

## 2021-01-01 RX ADMIN — MORPHINE SULFATE 2 MG: 2 INJECTION, SOLUTION INTRAMUSCULAR; INTRAVENOUS at 16:25

## 2021-01-01 RX ADMIN — ONDANSETRON 4 MG: 2 INJECTION INTRAMUSCULAR; INTRAVENOUS at 21:47

## 2021-01-01 RX ADMIN — MORPHINE SULFATE 2 MG: 2 INJECTION, SOLUTION INTRAMUSCULAR; INTRAVENOUS at 18:54

## 2021-01-01 RX ADMIN — SODIUM CHLORIDE 1000 ML: 9 INJECTION, SOLUTION INTRAVENOUS at 11:54

## 2021-01-01 RX ADMIN — MORPHINE SULFATE 2 MG: 2 INJECTION, SOLUTION INTRAMUSCULAR; INTRAVENOUS at 10:16

## 2021-01-01 RX ADMIN — MORPHINE SULFATE 2 MG: 2 INJECTION, SOLUTION INTRAMUSCULAR; INTRAVENOUS at 21:47

## 2021-01-01 RX ADMIN — CLOPIDOGREL BISULFATE 75 MG: 75 TABLET ORAL at 10:30

## 2021-01-01 RX ADMIN — CEFEPIME HYDROCHLORIDE 2 G: 2 INJECTION, POWDER, FOR SOLUTION INTRAVENOUS at 20:15

## 2021-01-01 RX ADMIN — MINERAL SUPPLEMENT IRON 300 MG / 5 ML STRENGTH LIQUID 100 PER BOX UNFLAVORED 300 MG: at 13:56

## 2021-01-01 RX ADMIN — LORAZEPAM 1 MG: 2 SOLUTION, CONCENTRATE ORAL at 13:31

## 2021-01-01 RX ADMIN — MORPHINE SULFATE 2 MG: 2 INJECTION, SOLUTION INTRAMUSCULAR; INTRAVENOUS at 04:01

## 2021-01-01 RX ADMIN — VANCOMYCIN HYDROCHLORIDE 750 MG: 750 INJECTION, POWDER, LYOPHILIZED, FOR SOLUTION INTRAVENOUS at 11:10

## 2021-01-01 RX ADMIN — SODIUM CHLORIDE 1170 ML: 9 INJECTION, SOLUTION INTRAVENOUS at 11:42

## 2021-01-01 RX ADMIN — MORPHINE SULFATE 2 MG: 2 INJECTION, SOLUTION INTRAMUSCULAR; INTRAVENOUS at 13:31

## 2021-01-01 RX ADMIN — MORPHINE SULFATE 2 MG: 2 INJECTION, SOLUTION INTRAMUSCULAR; INTRAVENOUS at 17:28

## 2021-01-01 RX ADMIN — Medication 10 ML: at 14:00

## 2021-01-01 RX ADMIN — MORPHINE SULFATE 2 MG: 2 INJECTION, SOLUTION INTRAMUSCULAR; INTRAVENOUS at 00:37

## 2021-01-01 RX ADMIN — VANCOMYCIN HYDROCHLORIDE 1000 MG: 1 INJECTION, POWDER, LYOPHILIZED, FOR SOLUTION INTRAVENOUS at 01:33

## 2021-01-01 RX ADMIN — LORAZEPAM 1 MG: 2 SOLUTION, CONCENTRATE ORAL at 18:54

## 2021-01-01 RX ADMIN — CEFEPIME HYDROCHLORIDE 2 G: 2 INJECTION, POWDER, FOR SOLUTION INTRAVENOUS at 11:00

## 2021-01-01 RX ADMIN — PANTOPRAZOLE SODIUM 40 MG: 40 GRANULE, DELAYED RELEASE ORAL at 13:56

## 2021-01-01 RX ADMIN — CEFEPIME HYDROCHLORIDE 2 G: 2 INJECTION, POWDER, FOR SOLUTION INTRAVENOUS at 05:23

## 2021-01-01 RX ADMIN — ONDANSETRON 4 MG: 2 INJECTION INTRAMUSCULAR; INTRAVENOUS at 06:49

## 2021-01-01 RX ADMIN — MORPHINE SULFATE 2 MG: 2 INJECTION, SOLUTION INTRAMUSCULAR; INTRAVENOUS at 15:50

## 2021-01-01 RX ADMIN — ACETAMINOPHEN 650 MG: 650 SUPPOSITORY RECTAL at 17:41

## 2021-01-01 RX ADMIN — MORPHINE SULFATE 2 MG: 2 INJECTION, SOLUTION INTRAMUSCULAR; INTRAVENOUS at 00:59

## 2021-01-01 RX ADMIN — Medication 5 ML: at 14:16

## 2021-01-01 RX ADMIN — MORPHINE SULFATE 2 MG: 2 INJECTION, SOLUTION INTRAMUSCULAR; INTRAVENOUS at 21:46

## 2021-01-01 RX ADMIN — HYDROCODONE BITARTRATE AND ACETAMINOPHEN 1 TABLET: 5; 325 TABLET ORAL at 13:09

## 2021-01-01 RX ADMIN — MORPHINE SULFATE 2 MG: 2 INJECTION, SOLUTION INTRAMUSCULAR; INTRAVENOUS at 06:40

## 2021-01-01 RX ADMIN — PIPERACILLIN AND TAZOBACTAM 3.38 G: 3; .375 INJECTION, POWDER, LYOPHILIZED, FOR SOLUTION INTRAVENOUS at 13:56

## 2021-01-01 RX ADMIN — AMIODARONE HYDROCHLORIDE 200 MG: 200 TABLET ORAL at 10:30

## 2021-01-01 RX ADMIN — ASPIRIN 81 MG: 81 TABLET, CHEWABLE ORAL at 10:31

## 2021-01-01 RX ADMIN — LORAZEPAM 1 MG: 2 SOLUTION, CONCENTRATE ORAL at 16:25

## 2021-01-01 RX ADMIN — LORAZEPAM 1 MG: 2 SOLUTION, CONCENTRATE ORAL at 15:50

## 2021-01-01 RX ADMIN — ATORVASTATIN CALCIUM 40 MG: 40 TABLET, FILM COATED ORAL at 01:33

## 2021-01-04 NOTE — PROGRESS NOTES
Renee Garcia 
76 y.o. female 1955 [id number] 1 [facility] Progress Note Encounter Date: 1/4/2021 Assessment and Plan:  
 
Encounter Diagnoses ICD-10-CM ICD-9-CM 1. Infection of PEG site ([email or web address]  K94.22 536.41  
2. Transient alteration of awareness  R40.4 780.02  
3. CKD (chronic kidney disease) requiring chronic dialysis ([facility])  N18.6 585.6 Z99.2 V45.11  
4. Vascular dementia with depressed mood ([email or web address]  F01.51 290.43 F32.9 5. Dental caries  K02.9 521.00  
6. History of stroke  Z86.73 V12.54  
7. Mixed hyperlipidemia  E78.2 272.2 1. Infection of PEG site ([email or web address] 2. Transient alteration of awareness Discussed with daughter abx to be given after dialysis. Daugther will try and schedule with dentist.  
- amoxicillin-clavulanate (AUGMENTIN) 600-42.9 mg/5 mL suspension; 7 mL by Per G Tube route two (2) times a day for 10 days. Dispense: 140 mL; Refill: 0 
- CBC WITH AUTOMATED DIFF; Future - CBC WITH AUTOMATED DIFF 3. CKD (chronic kidney disease) requiring chronic dialysis ([email or web address] - METABOLIC PANEL, BASIC; Future - METABOLIC PANEL, BASIC 4. Vascular dementia with depressed mood ([email or web address] 5. Dental caries 6. History of stroke- LIPID PANEL; Future - LIPID PANEL 7. Mixed hyperlipidemia - LIPID PANEL; Future - LIPID PANEL I have discussed the diagnosis with the patient and the intended plan as seen in the above orders. she has expressed understanding. The patient has received an after-visit summary and questions were answered concerning future plans. I have discussed medication side effects and warnings with the patient as well. Electronically Signed: Ivelisse Nguyen MD 
 
Current Medications after this visit Current Outpatient Medications Medication Sig  
 amoxicillin-clavulanate (AUGMENTIN) 600-42.9 mg/5 mL suspension 7 mL by Per G Tube route two (2) times a day for 10 days.  metoprolol tartrate (LOPRESSOR) 50 mg tablet Take 1 tablet by mouth twice daily  atorvastatin (LIPITOR) 40 mg tablet Take 1 tablet by mouth once daily  clopidogreL (PLAVIX) 75 mg tab Take 1 tablet by mouth once daily  amiodarone (CORDARONE) 200 mg tablet Take 1 tablet by mouth twice daily  traZODone (DESYREL) 50 mg tablet TAKE 1 TAB PER G TUBE AT BEDTIME  LORazepam (ATIVAN) 0.5 mg tablet Take 1 Tab by mouth every Monday, Wednesday, Friday. Max Daily Amount: 0.5 mg. Prior to dialysis.  ondansetron hcl (Zofran) 4 mg tablet Take 1 Tab by mouth every six (6) hours as needed for Nausea or Nausea or Vomiting.  OTHER,NON-FORMULARY, 1 hospital bed mattress.  OTHER,NON-FORMULARY, 1 tub transfer bed.  Bedside Commode XX 1 3-in-1 bedside commode. Fax to [facility] [phone]  Nut. Tx. Impaired Renal Fxn,Soy (Nepro Carb Steady) 0.08 gram-1.8 kcal/mL liqd 4 oz by Gastrostomy Tube route five (5) times daily.  nystatin (MYCOSTATIN) powder Apply  to affected area four (4) times daily.  lactobacillus-acidophilus (LACTINEX) 100 million cell grpk Take 1 Packet by mouth two (2) times a day.  cholecalciferol (VITAMIN D3) (50,000 UNITS /1250 MCG) capsule Take 1 capsule every Monday and Friday  ferrous sulfate 325 mg (65 mg iron) tablet Take 1 Tab by mouth two (2) times a day.  pantoprazole (Protonix) 40 mg granules for oral suspension Take 40 mg by mouth daily.  NUT. TX.IMPAIRED RENAL FXN,SOY (NEPRO PO) Take  by mouth. 1.5 CANS VIA FEEDING TUBE EVERY 4 HOURS PER DAUGHTER  aspirin 81 mg chewable tablet Take 81 mg by mouth daily. No current facility-administered medications for this visit. There are no discontinued medications. ~~~~~~~~~~~~~~~~~~~~~~~~~~~~~~~~~~~~~~~~~~~~~~ Chief Complaint Patient presents with  Labs  Other  
  possible pain in mouth or infection History provided by patient and daughter. History of Present Illness Kamaljit Mills is a 76 y.o. female who presents to clinic today for: 
 
Infection? Patient present with cc of AMS. Daughter reports that patient has been yelling incoherently for several days. Similar episodes in the past last month and found that she had infection which was treated with abx. She had been to GI this morning and was told that PEG anita was infection. She has PEG changed today. Daugther is also concerned that she may have dental infection. Patient had last seen dentist 1.5 years ago and was having teeth extracted however due to dialysis and mental status they stopped surgery to complete extraction. Dialysis TThSat. Health Maintenance Seen for acute care only Review of Systems Review of Systems Unable to perform ROS: Mental status change Vitals/Objective:  
 
Vitals:  
 01/04/21 1450 01/04/21 1542 BP: 119/77 Pulse: (!) 124 92 Resp: 16 Temp: 97.7 °F (36.5 °C) TempSrc: Temporal   
SpO2: 98% Weight: 86 lb (39 kg) Height: 5' 4\" (1.626 m) Body mass index is 14.76 kg/m². Wt Readings from Last 3 Encounters:  
01/04/21 86 lb (39 kg) 12/03/20 125 lb (56.7 kg) 12/03/20 125 lb (56.7 kg) Physical Exam 
Constitutional:   
   General: She is awake. She is not in acute distress. Appearance: Normal appearance. She is well-developed and underweight. She is not diaphoretic. HENT:  
   Head: Normocephalic and atraumatic. Right Ear: External ear normal.  
   Left Ear: External ear normal.  
   Mouth/Throat:  
   Pharynx: No oropharyngeal exudate or posterior oropharyngeal erythema. Eyes:  
   General:     
   Right eye: No discharge. Left eye: No discharge. Conjunctiva/sclera: Conjunctivae normal.  
Cardiovascular:  
   Rate and Rhythm: Normal rate and regular rhythm. Heart sounds: S1 normal and S2 normal. No murmur. Pulmonary:  
   Effort: Pulmonary effort is normal.  
   Breath sounds: Normal breath sounds. No rales. Abdominal: Comments: G tube in place. Musculoskeletal:  
   Right lower leg: No edema. Left lower leg: No edema. Comments: Right hand contracted. Lymphadenopathy:  
   Head:  
   Right side of head: No submental, submandibular, tonsillar, preauricular or posterior auricular adenopathy. Left side of head: No submental, submandibular, tonsillar, preauricular or posterior auricular adenopathy. Skin: 
   General: Skin is warm and dry. Neurological:  
   Mental Status: She is alert, oriented to person, place, and time and easily aroused. No results found for this or any previous visit (from the past 24 hour(s)). Disposition No future appointments. History Patient's past medical, surgical and family histories were reviewed and updated. Past Medical History:  
Diagnosis Date  CHF (congestive heart failure) ([facility] 10/2013  Chronic kidney disease ESRD/ARF Dialysi Tues-Thurs- Sat  GERD (gastroesophageal reflux disease)  Hypertension  Insomnia  Severe mitral regurgitation 2013  Stroke [facility])   Urinary incontinence  Vascular dementia with depressed mood ([facility] Past Surgical History:  
Procedure Laterality Date  HX BACK SURGERY    
 3 herniated discs  HX  SECTION Family History Problem Relation Age of Onset  Diabetes Mother Type 2  
 Heart Failure Father  Diabetes Sister 3 sisters  Diabetes Brother   
     3 brothers  Hypertension Daughter Social History Tobacco Use  Smoking status: Former Smoker Types: Cigarettes Quit date: 2013 Years since quittin.0  Smokeless tobacco: Never Used Substance Use Topics  Alcohol use: No  
 Drug use: No  
 
 
Allergies No Known Allergies

## 2021-01-06 PROBLEM — T68.XXXA HYPOTHERMIA: Status: ACTIVE | Noted: 2021-01-01

## 2021-01-06 PROBLEM — A41.9 SEPSIS (HCC): Status: ACTIVE | Noted: 2021-01-01

## 2021-01-06 PROBLEM — I95.9 HYPOTENSION: Status: ACTIVE | Noted: 2021-01-01

## 2021-01-06 NOTE — ED TRIAGE NOTES
AMS, unknown LKW, hx Dementia. Family notified EMS that patient \"normally just cusses us out all day\" however woke up \"moaning only\". Hypotensive 70 SBP with EMS, rec'd approx 500ml NS PTA. Hx ESRD, last dialysis on 1/5/2021.

## 2021-01-06 NOTE — Clinical Note
Status[de-identified] INPATIENT [101]   Type of Bed: Intensive Care [6]   Inpatient Hospitalization Certified Necessary for the Following Reasons: 3.  Patient receiving treatment that can only be provided in an inpatient setting (further clarification in H&P documentation)   Admitting Diagnosis: Sepsis [facility]) [[id number]]   Admitting Diagnosis: Hypothermia [[id number]]   Admitting Diagnosis: Hypotension [[id number]]   Admitting Physician: Basilio Willams   Attending Physician: Basilio Willams   Estimated Length of Stay: 5-7 Midnights   Discharge Plan[de-identified] Extended Care Facility (e.g. Adult Home, Nursing Home, etc.)

## 2021-01-06 NOTE — ED PROVIDER NOTES
EMERGENCY DEPARTMENT HISTORY AND PHYSICAL EXAM          Date: 1/6/2021  Patient Name: Lisa Nieves    History of Presenting Illness     Chief Complaint   Patient presents with    Altered mental status       History Provided By: Patient    HPI: Lisa Nieves is a 76 y.o. female, pmhx stage renal disease with last dialysis yesterday, mentioned, hypertension, CHF who presents EMS to the ED c/o of mental status    Unable to provide history or ROS secondary to altered mental status. According to the medics his family found her to have decreased communication today. Apparently she normally cusses everyone is always angry and this morning she was not doing out they were concerned and called EMS. On their arrival they found patient to be hypotensive in the 70s with an Accu-Chek of 168 and tachycardia. They placed her on oxygen and transported without event after putting into an [employer] and starting IV fluid bolus. Thus far she has received 500 mils of normal saline. PCP: Jimenez Gonzalez MD    Allergies: nkda  Social Hx: The home and cared for by family    There are no other complaints, changes, or physical findings at this time. Current Facility-Administered Medications   Medication Dose Route Frequency Provider Last Rate Last Admin    sodium chloride (NS) flush 5-10 mL  5-10 mL IntraVENous PRN Yeimi Gold MD        cefepime (MAXIPIME) 2 g in sterile water (preservative free) 10 mL IV syringe  2 g IntraVENous Q8H Yeimi Gold MD   2 g at 01/06/21 1100    VANCOMYCIN INFORMATION NOTE   Other PRN Yeimi Gold MD        [START ON 1/7/2021] Vancomycin random level to be drawn on 1/7/21 at 0600.    Other ONCE Yeimi Gold MD        0.9% sodium chloride infusion 250 mL  250 mL IntraVENous PRN Yeimi Gold MD        HYDROcodone-acetaminophen (NORCO) 5-325 mg per tablet 1 Tab  1 Tab Per G Tube NOW Yeimi Gold MD         Current Outpatient Medications Medication Sig Dispense Refill    amoxicillin-clavulanate (AUGMENTIN) 600-42.9 mg/5 mL suspension 7 mL by Per G Tube route two (2) times a day for 10 days. 140 mL 0    metoprolol tartrate (LOPRESSOR) 50 mg tablet Take 1 tablet by mouth twice daily 180 Tab 0    atorvastatin (LIPITOR) 40 mg tablet Take 1 tablet by mouth once daily 90 Tab 0    clopidogreL (PLAVIX) 75 mg tab Take 1 tablet by mouth once daily 90 Tab 0    amiodarone (CORDARONE) 200 mg tablet Take 1 tablet by mouth twice daily 180 Tab 0    traZODone (DESYREL) 50 mg tablet TAKE 1 TAB PER G TUBE AT BEDTIME 90 Tab 1    LORazepam (ATIVAN) 0.5 mg tablet Take 1 Tab by mouth every Monday, Wednesday, Friday. Max Daily Amount: 0.5 mg. Prior to dialysis. 30 Tab 0    ondansetron hcl (Zofran) 4 mg tablet Take 1 Tab by mouth every six (6) hours as needed for Nausea or Nausea or Vomiting. 30 Tab 1    OTHER,NON-FORMULARY, 1 hospital bed mattress. 1 Each 0    OTHER,NON-FORMULARY, 1 tub transfer bed. 1 Each 0    Bedside Commode XX 1 3-in-1 bedside commode. Fax to [employer] )[id number] 1 Each 0    Nut. Tx. Impaired Renal Fxn,Soy (Nepro Carb Steady) 0.08 gram-1.8 kcal/mL liqd 4 oz by Gastrostomy Tube route five (5) times daily. 600 Can 5    nystatin (MYCOSTATIN) powder Apply  to affected area four (4) times daily. 2 Bottle 1    lactobacillus-acidophilus (LACTINEX) 100 million cell grpk Take 1 Packet by mouth two (2) times a day. 12 Packet 2    cholecalciferol (VITAMIN D3) (50,000 UNITS /1250 MCG) capsule Take 1 capsule every Monday and Friday 27 Cap 2    ferrous sulfate 325 mg (65 mg iron) tablet Take 1 Tab by mouth two (2) times a day. 180 Tab 1    pantoprazole (Protonix) 40 mg granules for oral suspension Take 40 mg by mouth daily. 90 Each 1    NUT. TX.IMPAIRED RENAL FXN,SOY (NEPRO PO) Take  by mouth. 1.5 CANS VIA FEEDING TUBE EVERY 4 HOURS PER DAUGHTER      aspirin 81 mg chewable tablet Take 81 mg by mouth daily.          Past History     Past Medical History:  Past Medical History:   Diagnosis Date    CHF (congestive heart failure) ([facility] [address] 10/2013    Chronic kidney disease     ESRD/ARF Dialysi Tues-Thurs- Sat    GERD (gastroesophageal reflux disease)     Hypertension     Insomnia     Severe mitral regurgitation 2013    Stroke [facility])     Urinary incontinence     Vascular dementia with depressed mood (HCC)        Past Surgical History:  Past Surgical History:   Procedure Laterality Date    HX BACK SURGERY      3 herniated discs    HX  SECTION         Family History:  Family History   Problem Relation Age of Onset    Diabetes Mother         Type 2    Heart Failure Father     Diabetes Sister         3 sisters    Diabetes Brother         3 brothers    Hypertension Daughter        Social History:  Social History     Tobacco Use    Smoking status: Former Smoker     Types: Cigarettes     Quit date: 2013     Years since quittin.0    Smokeless tobacco: Never Used   Substance Use Topics    Alcohol use: No    Drug use: No       Allergies:  No Known Allergies      Review of Systems   Review of Systems   Unable to perform ROS: Mental status change     Patient continually screaming but unable to follow commands or answer questions  Physical Exam   Physical Exam  Vitals signs and nursing note reviewed. Constitutional:       Appearance: She is well-developed. She is not diaphoretic. Comments: Ill-appearing cachectic middle-aged female in moderate severe distress   HENT:      Head: Normocephalic and atraumatic. Mouth/Throat:      Comments: Mucous membranes  Eyes:      General:         Right eye: No discharge. Left eye: No discharge. Conjunctiva/sclera: Conjunctivae normal.      Pupils: Pupils are equal, round, and reactive to light. Neck:      Musculoskeletal: Normal range of motion and neck supple. Cardiovascular:      Rate and Rhythm: Regular rhythm. Tachycardia present.       Heart sounds: Murmur present. No friction rub. No gallop. Comments: Chest tunnel catheter appears contaminated  Pulmonary:      Effort: Pulmonary effort is normal. No respiratory distress. Breath sounds: Normal breath sounds. No stridor. No wheezing, rhonchi or rales. Abdominal:      General: Bowel sounds are normal. There is no distension. Palpations: Abdomen is soft. There is no mass. Tenderness: There is no abdominal tenderness. There is no guarding. Comments: Tube in place   Musculoskeletal: Normal range of motion. Skin:     General: Skin is warm and dry. Capillary Refill: Capillary refill takes 2 to 3 seconds. Coloration: Skin is not cyanotic. Findings: No erythema or rash. Neurological:      Mental Status: She is alert. GCS: GCS eye subscore is 4. GCS verbal subscore is 2. GCS motor subscore is 5. Cranial Nerves: No cranial nerve deficit. Motor: No abnormal muscle tone.        Diagnostic Study Results     Labs -     Recent Results (from the past 12 hour(s))   EKG, 12 LEAD, INITIAL    Collection Time: 01/06/21 10:32 AM   Result Value Ref Range    Ventricular Rate 115 BPM    Atrial Rate 115 BPM    P-R Interval 138 ms    QRS Duration 74 ms    Q-T Interval 368 ms    QTC Calculation (Bezet) 509 ms    Calculated P Axis -12 degrees    Calculated R Axis 63 degrees    Calculated T Axis 74 degrees    Diagnosis       Sinus tachycardia  Anterior infarct (cited on or before 17-FEB-2020)  Abnormal ECG  When compared with ECG of 03-DEC-2020 21:06,  No significant change was found  Confirmed by Kayleen Lopez ([id number]) on 1/6/2021 88:72:72 PM     METABOLIC PANEL, COMPREHENSIVE    Collection Time: 01/06/21 10:45 AM   Result Value Ref Range    Sodium 133 (L) 136 - 145 mmol/L    Potassium 3.9 3.5 - 5.1 mmol/L    Chloride 96 (L) 97 - 108 mmol/L    CO2 26 21 - 32 mmol/L    Anion gap 11 5 - 15 mmol/L    Glucose 137 (H) 65 - 100 mg/dL    BUN 44 (H) 6 - 20 mg/dL    Creatinine 3.91 (H) 0.55 - 1.02 mg/dL    BUN/Creatinine ratio 11 (L) 12 - 20      GFR est AA 14 (L) >60 ml/min/1.73m2    GFR est non-AA 12 (L) >60 ml/min/1.73m2    Calcium 8.1 (L) 8.5 - 10.1 mg/dL    Bilirubin, total 0.8 0.2 - 1.0 mg/dL    AST (SGOT) 105 (H) 15 - 37 U/L    ALT (SGPT) 66 12 - 78 U/L    Alk. phosphatase 197 (H) 45 - 117 U/L    Protein, total 5.9 (L) 6.4 - 8.2 g/dL    Albumin 1.8 (L) 3.5 - 5.0 g/dL    Globulin 4.1 (H) 2.0 - 4.0 g/dL    A-G Ratio 0.4 (L) 1.1 - 2.2     CBC WITH AUTOMATED DIFF    Collection Time: 01/06/21 10:45 AM   Result Value Ref Range    WBC 25.1 (H) 3.6 - 11.0 K/uL    RBC 2.64 (L) 3.80 - 5.20 M/uL    HGB 6.2 (L) 11.5 - 16.0 g/dL    HCT 19.7 (L) 35.0 - 47.0 %    MCV 74.6 (L) 80.0 - 99.0 FL    MCH 23.5 (L) 26.0 - 34.0 PG    MCHC 31.5 30.0 - 36.5 g/dL    RDW 22.1 (H) 11.5 - 14.5 %    PLATELET 56 (L) 065 - 400 K/uL    NEUTROPHILS 90 (H) 32 - 75 %    LYMPHOCYTES 7 (L) 12 - 49 %    MONOCYTES 3 (L) 5 - 13 %    EOSINOPHILS 0 0 - 7 %    BASOPHILS 0 0 - 1 %    IMMATURE GRANULOCYTES 0 0.0 - 0.5 %    ABS. NEUTROPHILS 22.5 (H) 1.8 - 8.0 K/UL    ABS. LYMPHOCYTES 1.9 0.8 - 3.5 K/UL    ABS. MONOCYTES 0.8 0.0 - 1.0 K/UL    ABS. EOSINOPHILS 0.0 0.0 - 0.4 K/UL    ABS. BASOPHILS 0.0 0.0 - 0.1 K/UL    ABS. IMM. GRANS. 0 0.00 - 0.04 K/UL    DF AUTOMATED     TROPONIN I    Collection Time: 01/06/21 10:45 AM   Result Value Ref Range    Troponin-I, Qt. 1.97 (H) <0.05 ng/mL   MAGNESIUM    Collection Time: 01/06/21 10:45 AM   Result Value Ref Range    Magnesium 2.5 (H) 1.6 - 2.4 mg/dL   PROTHROMBIN TIME + INR    Collection Time: 01/06/21 10:45 AM   Result Value Ref Range    Prothrombin time 18.2 (H) 11.9 - 14.7 sec    INR 1.5 (H) 0.9 - 1.1     LACTIC ACID    Collection Time: 01/06/21 11:40 AM   Result Value Ref Range    Lactic acid 3.1 (HH) 0.4 - 2.0 mmol/L       Radiologic Studies -   XR CHEST PORT   Final Result   FINDINGS: Impression: Frontal supine single view chest does not include the left   lower lateral lung.       Right dialysis catheter distal tip right atrium. Cardiomegaly, vascular congestion. Right greater than left pulmonary airspace edema and/or pneumonia. Small to   moderate size right pleural effusion. No pneumothorax. No acute bony findings. CT Results  (Last 48 hours)    None        CXR Results  (Last 48 hours)               01/06/21 1117  XR CHEST PORT Final result    Impression:  FINDINGS: Impression: Frontal supine single view chest does not include the left   lower lateral lung. Right dialysis catheter distal tip right atrium. Cardiomegaly, vascular congestion. Right greater than left pulmonary airspace edema and/or pneumonia. Small to   moderate size right pleural effusion. No pneumothorax. No acute bony findings. Narrative: Infiltrate. Emergency Department note altered mental status. Comparison chest x-ray 12/3/2020. Medical Decision Making   I am the first provider for this patient. I reviewed the vital signs, available nursing notes, past medical history, past surgical history, family history and social history. Vital Signs-Reviewed the patient's vital signs. Patient Vitals for the past 12 hrs:   Temp Pulse Resp BP SpO2   01/06/21 1144  98 (!) 40 (!) 80/57 98 %   01/06/21 1120 98.1 °F (36.7 °C) (!) 114 (!) 36 (!) 83/67    01/06/21 1120   (!) 38  (!) 83 %   01/06/21 1042 (!) 93 °F (33.9 °C) (!) 123 20 (!) 88/62 96 %   01/06/21 1031 (!) 90.5 °F (32.5 °C) (!) 104 20 90/64 95 %       Pulse Oximetry Analysis - 96% on liters nasal cannula     Cardiac Monitor:   Rate: 123bpm  Rhythm: Sinus Tachycardia      Records Reviewed: Nursing Notes, Old Medical Records, Previous electrocardiograms, Ambulance Run Sheet, Previous Radiology Studies and Previous Laboratory Studies    Provider Notes (Medical Decision Making):   MDM: Middle-aged male with multiple chronic conditions presents with concern for acute sepsis/septic shock.   Medics note that patient was given an antibiotic that was started yesterday after dialysis but the family is unsure for what reason. Given the appearance of her tunnel catheter will give broad coverage with vancomycin. Sepsis orders that initiated with close monitoring. ED Course:   Initial assessment performed. The patients presenting problems have been discussed, and they are in agreement with the care plan formulated and outlined with them. I have encouraged them to ask questions as they arise throughout their visit. EKG interpretation: (Preliminary)  Rhythm: Sinus tachycardia at a rate of 115 bpm; normal MN; normal QRS; prolongation of the QTc interval with normal axis. There are Q waves noted in V1 and V2. Able to review prior EKG from 12/20/2020  This EKG was interpreted by ED Provider Beatriz Mathews MD    PROGRESS NOTE:    ED Course as of Jan 06 1308   Wed Jan 06, 2021   1152 Patient remains hypotensive but her heart rate is improving at 100 currently. Lungs remain clear and she is received 800 mils of normal saline thus far. Repeat bolus to be given. [JT]   1208 Patient's family in the ER and provides further history. Patient does not have a DNR but her daughter notes that she has stated she does not want to live like this any longer. They have been updated regarding her critical nature. Further history provided that patient has a dental infection for which she was started on amoxicillin 2 days ago at her primary care doctor's office. Await lab results. [JT]   1248 Family all outside and updated regarding available results. Await chemistry and will call admission. They do agree to transfusion and states she was given a unit last month. They understand patient's critical nature. DNR verified with her daughters who are her caregivers.     [JT]      ED Course User Index  [JT] Xander Young MD      CONSULT NOTE:   1:07 PM  Beatriz Mathews MD spoke with Dr. Anay Maciel,   Specialty: Hospitalist  Discussed pt's hx, disposition, and available diagnostic and imaging results. Reviewed care plans. Consultant agrees with plans as outlined. He will evaluate the patient for admission. Critical Care Time:   CRITICAL CARE NOTE :  1:07 PM   IMPENDING DETERIORATION -Airway, Respiratory, Cardiovascular, CNS, Metabolic, Renal and Hepatic  ASSOCIATED RISK FACTORS - Hypotension, Shock, Hypoxia, Dysrhythmia, Metabolic changes, Dehydration and CNS Decompensation  MANAGEMENT- Bedside Assessment and Supervision of Care  INTERPRETATION -  Xrays, ECG and Blood Pressure  INTERVENTIONS - hemodynamic mngmt and Metobolic interventions  CASE REVIEW - Hospitalist, Nursing and Family  TREATMENT RESPONSE -Stable  PERFORMED BY - Self    NOTES   :  I have spent 75 minutes of critical care time involved in lab review, consultations with specialist, family decision- making, bedside attention and documentation. During this entire length of time I was immediately available to the patient. Jigar Porras. MD Asif      Diagnosis     Clinical Impression:   1. Delirium    2. Hypotension, unspecified hypotension type    3. Tachycardia    4. Hypothermia, initial encounter    5. Anemia, unspecified type        PLAN:  Admit [facility]      Please note, this dictation was completed with [employer], the computer voice recognition software. Quite often unanticipated grammatical, syntax, homophones, and other interpretive errors are inadvertently transcribed by the computer software. Please disregard these errors. Please excuse any errors that have escaped final proof reading.

## 2021-01-06 NOTE — H&P
History and Physical    Patient: Perez Stewart MRN: [id number]  SSN: [id number]    YOB: 1955  Age: 76 y.o. Sex: female      Subjective:      Perez Stewart is a 76 y.o. female who Admitted on [id number]. PMH never came for chronic renal failure, arthritis, DM, ESRD on HD, GERD, gout, hypertension, iron deficiency anemia, dementia, wheelchair-bound. Family called EMS after she became lethargic nonresponsive at home. Patiently seen outpatient for dental infection and placed on amoxicillin 2 days ago. EMS found her hypotensive systolic blood pressure in the 70s and tachycardic. In the ED she is hypothermic at 90.5, tachycardic 124, breathing 20-40 times, BP 80/57. Chest x-ray cardiomegaly, vascular congestion are >L airspace edema and/or pneumonia, Small to moderate size right pleural effusion. Significant lab values hemoglobin 6.2, platelets 56, lactic acid 3.1, procalcitonin 20.24, troponin 1 normal saline with . 97. Will admit for further management of presumed sepsis, hypotension, hypothermia, tachycardia, tachypneic. Will start on IV vancomycin and cefepime empirically. Consult ID. After coming up to 98. Will transfuse 1 unit PRBC.        Past Medical History:   Diagnosis Date    CHF (congestive heart failure) ([facility] [address] 10/2013    Chronic kidney disease     ESRD/ARF Dialysi Tues-Thurs- Sat    GERD (gastroesophageal reflux disease)     Hypertension     Insomnia     Severe mitral regurgitation 2013    Stroke [facility])     Urinary incontinence     Vascular dementia with depressed mood ([facility])      Past Surgical History:   Procedure Laterality Date    HX BACK SURGERY      3 herniated discs    HX  SECTION        Family History   Problem Relation Age of Onset    Diabetes Mother         Type 2    Heart Failure Father     Diabetes Sister         3 sisters    Diabetes Brother         3 brothers    Hypertension Daughter      Social History     Tobacco Use    Smoking status: Former Smoker     Types: Cigarettes     Quit date: 2013     Years since quittin.0    Smokeless tobacco: Never Used   Substance Use Topics    Alcohol use: No      Prior to Admission medications    Medication Sig Start Date End Date Taking? Authorizing Provider   amoxicillin-clavulanate (AUGMENTIN) 600-42.9 mg/5 mL suspension 7 mL by Per G Tube route two (2) times a day for 10 days. 21  Arelis Iglesias MD   metoprolol tartrate (LOPRESSOR) 50 mg tablet Take 1 tablet by mouth twice daily 20   Arianna Reef, NP   atorvastatin (LIPITOR) 40 mg tablet Take 1 tablet by mouth once daily 20   Arianna Reef, NP   clopidogreL (PLAVIX) 75 mg tab Take 1 tablet by mouth once daily 20   Lender Face B, NP   amiodarone (CORDARONE) 200 mg tablet Take 1 tablet by mouth twice daily 20   Sonja Young, MD   traZODone (DESYREL) 50 mg tablet TAKE 1 TAB PER G TUBE AT BEDTIME 20   Arelis Iglesias MD   LORazepam (ATIVAN) 0.5 mg tablet Take 1 Tab by mouth every Monday, Wednesday, Friday. Max Daily Amount: 0.5 mg. Prior to dialysis. 20   Arelis Iglesias MD   ondansetron hcl (Zofran) 4 mg tablet Take 1 Tab by mouth every six (6) hours as needed for Nausea or Nausea or Vomiting. 20   Arelis Iglesias MD   OTHER,NON-FORMULARY, 1 hospital bed mattress. 20   Arelis Iglesias MD   OTHER,NON-FORMULARY, 1 tub transfer bed. 20   Arelis Iglesias MD   Bedside Commode XX 1 3-in-1 bedside commode. Fax to [employer] [phone] 20   Arelis Iglesias MD   Nut. Tx. Impaired Renal Fxn,Soy (Nepro Carb Steady) 0.08 gram-1.8 kcal/mL liqd 4 oz by Gastrostomy Tube route five (5) times daily. 20   Arelis Iglesias MD   nystatin (MYCOSTATIN) powder Apply  to affected area four (4) times daily. 20   HlaArelis sevilla MD   lactobacillus-acidophilus (LACTINEX) 100 million cell grpk Take 1 Packet by mouth two (2) times a day. 5/12/20   Alessia PIEDRA NP   cholecalciferol (VITAMIN D3) (50,000 UNITS /1250 MCG) capsule Take 1 capsule every Monday and Friday 5/7/20   Alessia PIEDRA NP   ferrous sulfate 325 mg (65 mg iron) tablet Take 1 Tab by mouth two (2) times a day. 5/7/20   Ronald Montague, NP   pantoprazole (Protonix) 40 mg granules for oral suspension Take 40 mg by mouth daily. 5/7/20   Vero Vargas NP   NUT. TX.IMPAIRED RENAL FXN,SOY (NEPRO PO) Take  by mouth. 1.5 CANS VIA FEEDING TUBE EVERY 4 HOURS PER DAUGHTER    Provider, Historical   aspirin 81 mg chewable tablet Take 81 mg by mouth daily. Provider, Historical        No Known Allergies    Review of Systems:  Mental status unable to provide meaningful answers    Objective:     Vitals:    01/06/21 1120 01/06/21 1144 01/06/21 1314 01/06/21 1444   BP: (!) 83/67 (!) 80/57 107/73 109/72   Pulse: (!) 114 98 (!) 124 (!) 109   Resp: (!) 36 (!) 40 (!) 38    Temp: 98.1 °F (36.7 °C)   99.7 °F (37.6 °C)   SpO2:  98% 100% 94%   Weight:       Height:            Physical Exam:  General: Lethargic, cooperative, moderate distress  Eye: conjunctivae/corneas clear. PERRL, EOM's intact. Throat and Neck: normal and no erythema or exudates noted. No mass   Lung: clear to auscultation bilaterally  Heart: regular rate and rhythm,   Abdomen: soft, non-tender. Bowel sounds normal. No masses,  Extremities:  able to move all extremities normal, atraumatic  Skin: Normal.  Neurologic: Baseline dementiamore lethargic than baseline.   Old CVA wheelchair-bound  Psychiatric: Baseline dementia with behavioral disturbance s    Recent Results (from the past 24 hour(s))   EKG, 12 LEAD, INITIAL    Collection Time: 01/06/21 10:32 AM   Result Value Ref Range    Ventricular Rate 115 BPM    Atrial Rate 115 BPM    P-R Interval 138 ms    QRS Duration 74 ms    Q-T Interval 368 ms    QTC Calculation (Bezet) 509 ms    Calculated P Axis -12 degrees    Calculated R Axis 63 degrees    Calculated T Axis 74 degrees    Diagnosis       Sinus tachycardia  Anterior infarct (cited on or before 17-FEB-2020)  Abnormal ECG  When compared with ECG of 03-DEC-2020 21:06,  No significant change was found  Confirmed by Brandt Scherer ([id number]) on 1/6/2021 86:59:78 PM     METABOLIC PANEL, COMPREHENSIVE    Collection Time: 01/06/21 10:45 AM   Result Value Ref Range    Sodium 133 (L) 136 - 145 mmol/L    Potassium 3.9 3.5 - 5.1 mmol/L    Chloride 96 (L) 97 - 108 mmol/L    CO2 26 21 - 32 mmol/L    Anion gap 11 5 - 15 mmol/L    Glucose 137 (H) 65 - 100 mg/dL    BUN 44 (H) 6 - 20 mg/dL    Creatinine 3.91 (H) 0.55 - 1.02 mg/dL    BUN/Creatinine ratio 11 (L) 12 - 20      GFR est AA 14 (L) >60 ml/min/1.73m2    GFR est non-AA 12 (L) >60 ml/min/1.73m2    Calcium 8.1 (L) 8.5 - 10.1 mg/dL    Bilirubin, total 0.8 0.2 - 1.0 mg/dL    AST (SGOT) 105 (H) 15 - 37 U/L    ALT (SGPT) 66 12 - 78 U/L    Alk. phosphatase 197 (H) 45 - 117 U/L    Protein, total 5.9 (L) 6.4 - 8.2 g/dL    Albumin 1.8 (L) 3.5 - 5.0 g/dL    Globulin 4.1 (H) 2.0 - 4.0 g/dL    A-G Ratio 0.4 (L) 1.1 - 2.2     CBC WITH AUTOMATED DIFF    Collection Time: 01/06/21 10:45 AM   Result Value Ref Range    WBC 25.1 (H) 3.6 - 11.0 K/uL    RBC 2.64 (L) 3.80 - 5.20 M/uL    HGB 6.2 (L) 11.5 - 16.0 g/dL    HCT 19.7 (L) 35.0 - 47.0 %    MCV 74.6 (L) 80.0 - 99.0 FL    MCH 23.5 (L) 26.0 - 34.0 PG    MCHC 31.5 30.0 - 36.5 g/dL    RDW 22.1 (H) 11.5 - 14.5 %    PLATELET 56 (L) 032 - 400 K/uL    NEUTROPHILS 90 (H) 32 - 75 %    LYMPHOCYTES 7 (L) 12 - 49 %    MONOCYTES 3 (L) 5 - 13 %    EOSINOPHILS 0 0 - 7 %    BASOPHILS 0 0 - 1 %    IMMATURE GRANULOCYTES 0 0.0 - 0.5 %    ABS. NEUTROPHILS 22.5 (H) 1.8 - 8.0 K/UL    ABS. LYMPHOCYTES 1.9 0.8 - 3.5 K/UL    ABS. MONOCYTES 0.8 0.0 - 1.0 K/UL    ABS. EOSINOPHILS 0.0 0.0 - 0.4 K/UL    ABS. BASOPHILS 0.0 0.0 - 0.1 K/UL    ABS. IMM.  GRANS. 0 0.00 - 0.04 K/UL    DF AUTOMATED     TROPONIN I    Collection Time: 01/06/21 10:45 AM   Result Value Ref Range    Troponin-I, Qt. 1.97 (H) <0.05 ng/mL   MAGNESIUM    Collection Time: 01/06/21 10:45 AM   Result Value Ref Range    Magnesium 2.5 (H) 1.6 - 2.4 mg/dL   PROTHROMBIN TIME + INR    Collection Time: 01/06/21 10:45 AM   Result Value Ref Range    Prothrombin time 18.2 (H) 11.9 - 14.7 sec    INR 1.5 (H) 0.9 - 1.1     PROCALCITONIN    Collection Time: 01/06/21 10:45 AM   Result Value Ref Range    Procalcitonin 20.24 (H) 0 ng/mL   LACTIC ACID    Collection Time: 01/06/21 11:40 AM   Result Value Ref Range    Lactic acid 3.1 (HH) 0.4 - 2.0 mmol/L   INFLUENZA A & B AG (RAPID TEST)    Collection Time: 01/06/21 12:50 PM   Result Value Ref Range    Influenza A Antigen Negative Negative      Influenza B Antigen Negative Negative     TYPE & SCREEN    Collection Time: 01/06/21 12:50 PM   Result Value Ref Range    Crossmatch Expiration 01/09/2021,2359     ABO/Rh(D) O Positive     Antibody screen Negative     Unit number [id number]     Blood component type RC LR     Unit division 00     Status of unit Issued     [address] to transfuse     Crossmatch result Compatible        XR Results (maximum last 3): Results from [place] encounter on 01/06/21   XR CHEST PORT    Narrative Infiltrate. Emergency Department note altered mental status. Comparison chest x-ray 12/3/2020. Impression FINDINGS: Impression: Frontal supine single view chest does not include the left  lower lateral lung. Right dialysis catheter distal tip right atrium. Cardiomegaly, vascular congestion. Right greater than left pulmonary airspace edema and/or pneumonia. Small to  moderate size right pleural effusion. No pneumothorax. No acute bony findings. Results from [place] encounter on 12/03/20   XR HUMERUS RT    Narrative Right humerus: AP and lateral views. No priors available for comparison. Impression Findings/impression:    Alignment is anatomic. No fracture or dislocation.   There is mild degenerative change involving the right shoulder present. Unremarkable soft tissues. XR CHEST PORT    Narrative AP semiupright portable chest x-ray:    Comparison with chest x-ray from April 11, 2020. Impression Findings/impression:    Right-sided central venous catheter projects over SVC. Clear lungs. No  pneumothorax or effusion. No overt edema. Lower chest is partially obscured from the overlying hand. No suspicious osseous lesions. No acute process. Previously noted mild volume overload appears significantly improved. CT Results (maximum last 3): Results from [place] encounter on 12/03/20   CT SPINE CERV WO CONT    Narrative Study: CT of the cervical spine without contrast.    Clinical indication: Trauma. Technique: CT volume acquisition through the cervical spine was obtained. Axial,  sagittal, coronal images were provided in bone and soft tissue kernels. Comparison: None available. Findings:    Endplate destructive changes are seen at C5-C6 and C7-T1, etiology  indeterminate. No significant prevertebral soft tissue swelling. These plates  appear fragmented, but it is unclear if this is related to chronic degenerative  change or inflammatory process. Pattern is unlikely to be related to acute  trauma. Severe carotid atherosclerosis. Visualized lung apices are clear. Tunneled right  IJ central venous catheter partially visualized. Impression Impression:  Nonspecific destructive changes/fragmentation about the endplates at W9-O8 and  C7-T1. Unlikely related to trauma. Could be due to degenerative change, but  inflammatory process such as discitis cannot be excluded. Consider follow-up MRI  based on clinical concern. CT HEAD WO CONT    Narrative Study: Head CT without contrast.    Clinical indication: Trauma    Technique: Routine volume acquisition of the head was obtained without IV  contrast. Coronal and sagittal reformations were generated in soft tissue and  bone kernels.  Dose reduction: All CT scans at this facility are performed using  dose reduction optimization techniques as appropriate to a performed exam  including the following: Automated exposure control, adjustments of the mA  and/or kV according to patient size, or use of iterative reconstruction  technique. Comparison: Head CT 11/18/2019. Findings:     Generalized cerebral volume loss, significantly progressed since prior head CT. No evidence of acute intracranial hemorrhage, mass effect, midline shift or  abnormal extra-axial fluid collection. Ventricles and basal cisterns are  preserved and are symmetric. Prominent periventricular and subcortical white  matter hypodensities bilaterally, suggesting chronic small vessel ischemic  change. Cortical hypodensity in the posterior right occipital lobe suggestive of  a late subacute or chronic infarction. Remote right basal ganglia lacunar infarctions, not seen on the prior study. Severe intracranial vascular calcifications. Left lateral facial hematoma just above the zygomatic arch. No evidence of skull  fracture. Visualized paranasal sinuses and mastoid air cells are clear. Globes  and orbits are intact. Impression Impression:  1. Left lateral facial soft tissue hematoma. 2.  No acute intracranial hemorrhage. 3.  Cortical hypodensity in the posterior right occipital lobe, suggesting late  subacute or chronic encephalomalacia. Progressive chronic small vessel ischemic  disease. 4.  Rapid progression of generalized cerebral volume loss compared to head CT  from 11/18/2019. MRI Results (maximum last 3): No results found for this or any previous visit. Nuclear Medicine Results (maximum last 3): No results found for this or any previous visit. US Results (maximum last 3): No results found for this or any previous visit.     Active Problems:    Vascular dementia with depressed mood ([facility] [address] (9/6/2018)      CKD (chronic kidney disease) requiring chronic dialysis [facility]) ()      Overview: ESRD/ARF Dialysi [facility]      Hypertension ()      Hemiplegia following cerebrovascular accident (CVA) ([facility] [address] (6/17/2020)      Gastrostomy tube dependent ([facility] [address] (6/17/2020)      Anemia of chronic disease (6/17/2020)      Iron deficiency anemia (6/17/2020)      Dysphagia (6/23/2020)      Hypothermia (1/6/2021)      Hypotension (1/6/2021)      Sepsis ([facility] [address] (1/6/2021)        Assessment/Plan:     1. Sepsis:  · Hypothermic, hypotensive, Negative, tachycardic with altered mental status  · Bear huggy as needed, started IV fluids, 1 L IV bolus  · Blood and urine culture pending  · Started IV vancomycin and cefepime  · ID consulted    2. ESRD on HD:  · Consult nephrology  · Monitor labs    3. Respiratory distress with hypoxia:  4. Tachypnea:  5. COVID-19 test pending:  · Currently on 4 L nasal cannula  · Chest x-ray reveals right greater than left pulmonary airspace edema and/or pneumonia, small to moderate right pleural effusion    6. CVA with residual: Wheelchair-bound  7. Dementia: With behavioral disturbance  8. Failure to thrive:  · PEG tube for nutrition and hydration support  · Lives at home with family    9.   Non-STEMI:  10.  Elevated troponin:  · Consult cardiology      DVT Prophylaxis: Not needed wheelchair-bound  Code Status: DNR  POA/NOK:  Total Time spent in direct and indirect care including assessment review of labs and coordination of services and consultations: Greater than 75 minutes      Signed By: Anisa Lamb NP     January 6, 2021

## 2021-01-07 NOTE — CONSULTS
Infectious Disease Consult Note    Reason for Consult: Sepsis   Date of Consultation: 2021  Date of Admission: 2021  Referring Physician: Hospitalist       HPI:65-y.o BF who presented to the ED w c/o AMS, ID consulted due to concerns of sepsis. Her medical history is significant for CHF, ESRD on HD, GERD, caries, severe mitral regurgitation, dementia, urinary incontinence, dysphagia s/p peg tube placement, and CVA. She is a very limited historian, history obtained from chart documentations. She was reportedly on amoxicillin as outpatient for tooth infection. She was hypothermic initially assessment the ED with a temp of 90.5F, poor tensive, tachycardic and maintaining O2 sats on RA. Routine labs show some for leukocytosis of 25.1, Procal 20.24, lactic acid 3.1, hgb 6.2 and elevated liver enzymes. She tested negative for COVID-19. Today's CXR shows bilateral infiltrates R>L and a small to moderate right pleural effusion. She is on Vanc and Cefepime. Pt was seen in the ID awaiting transfer to the floors.      Review of Systems: Unobtainable, non-contributory       Past Medical History:  Past Medical History:   Diagnosis Date    CHF (congestive heart failure) ([facility] [address] 10/2013    Chronic kidney disease     ESRD/ARF Dialysi Tues-Thurs- Sat    GERD (gastroesophageal reflux disease)     Hypertension     Insomnia     Severe mitral regurgitation 2013    Stroke [facility])     Urinary incontinence     Vascular dementia with depressed mood (HCC)        Past surgical history   Past Surgical History:   Procedure Laterality Date    HX BACK SURGERY      3 herniated discs    HX  SECTION          Social History   Social History     Tobacco Use    Smoking status: Former Smoker     Types: Cigarettes     Quit date: 2013     Years since quittin.0    Smokeless tobacco: Never Used   Substance Use Topics    Alcohol use: No    Drug use: No        Family history   Family History   Problem Relation Age of Onset    Diabetes Mother         Type 2    Heart Failure Father     Diabetes Sister         3 sisters    Diabetes Brother         3 brothers    Hypertension Daughter      Allergies:  No Known Allergies      Medications:  No current facility-administered medications on file prior to encounter. Current Outpatient Medications on File Prior to Encounter   Medication Sig Dispense Refill    amoxicillin-clavulanate (AUGMENTIN) 600-42.9 mg/5 mL suspension 7 mL by Per G Tube route two (2) times a day for 10 days. 140 mL 0    metoprolol tartrate (LOPRESSOR) 50 mg tablet Take 1 tablet by mouth twice daily 180 Tab 0    atorvastatin (LIPITOR) 40 mg tablet Take 1 tablet by mouth once daily 90 Tab 0    clopidogreL (PLAVIX) 75 mg tab Take 1 tablet by mouth once daily 90 Tab 0    amiodarone (CORDARONE) 200 mg tablet Take 1 tablet by mouth twice daily 180 Tab 0    traZODone (DESYREL) 50 mg tablet TAKE 1 TAB PER G TUBE AT BEDTIME 90 Tab 1    LORazepam (ATIVAN) 0.5 mg tablet Take 1 Tab by mouth every Monday, Wednesday, Friday. Max Daily Amount: 0.5 mg. Prior to dialysis. 30 Tab 0    ondansetron hcl (Zofran) 4 mg tablet Take 1 Tab by mouth every six (6) hours as needed for Nausea or Nausea or Vomiting. 30 Tab 1    OTHER,NON-FORMULARY, 1 hospital bed mattress. 1 Each 0    OTHER,NON-FORMULARY, 1 tub transfer bed. 1 Each 0    Bedside Commode XX 1 3-in-1 bedside commode. Fax to [employer] [phone] 1 Each 0    Nut. Tx. Impaired Renal Fxn,Soy (Nepro Carb Steady) 0.08 gram-1.8 kcal/mL liqd 4 oz by Gastrostomy Tube route five (5) times daily. 600 Can 5    nystatin (MYCOSTATIN) powder Apply  to affected area four (4) times daily. 2 Bottle 1    lactobacillus-acidophilus (LACTINEX) 100 million cell grpk Take 1 Packet by mouth two (2) times a day.  12 Packet 2    cholecalciferol (VITAMIN D3) (50,000 UNITS /1250 MCG) capsule Take 1 capsule every Monday and Friday 27 Cap 2    ferrous sulfate 325 mg (65 mg iron) tablet Take 1 Tab by mouth two (2) times a day. 180 Tab 1    pantoprazole (Protonix) 40 mg granules for oral suspension Take 40 mg by mouth daily. 90 Each 1    NUT. TX.IMPAIRED RENAL FXN,SOY (NEPRO PO) Take  by mouth. 1.5 CANS VIA FEEDING TUBE EVERY 4 HOURS PER DAUGHTER      aspirin 81 mg chewable tablet Take 81 mg by mouth daily. Physical Exam:    Vitals:   Patient Vitals for the past 24 hrs:   Temp Pulse Resp BP SpO2   01/06/21 1922  (!) 106  105/71    01/06/21 1748 99.9 °F (37.7 °C) (!) 105  103/68 90 %   01/06/21 1720 99.9 °F (37.7 °C) (!) 115 29 104/69 95 %   01/06/21 1620 99.7 °F (37.6 °C) (!) 118 (!) 36 (!) 102/59 92 %   01/06/21 1550 97.6 °F (36.4 °C)  30 102/64 93 %   01/06/21 1520  (!) 107 (!) 33 104/66 91 %   01/06/21 1506  (!) 118 (!) 37 106/72 93 %   01/06/21 1444 99.7 °F (37.6 °C) (!) 109  109/72 94 %   01/06/21 1314  (!) 124 (!) 38 107/73 100 %   01/06/21 1144  98 (!) 40 (!) 80/57 98 %   01/06/21 1120 98.1 °F (36.7 °C) (!) 114 (!) 36 (!) 83/67    01/06/21 1120   (!) 38  (!) 83 %   01/06/21 1042 (!) 93 °F (33.9 °C) (!) 123 20 (!) 88/62 96 %   01/06/21 1031 (!) 90.5 °F (32.5 °C) (!) 104 20 90/64 95 %   ·   · GEN: NAD, confused   · HEENT: NCAT, PERRLA, poor dentition   · HEART: S1, S2+, RRR, No murmur   · Lungs: CTA B/l, decreased at the bases   · Abdomen: soft, ND, NT, +BS, + peg tube   · Genitourinary:+ campuzano cath   · Extremities: contracted exts   · Skin: no rash  ·   Labs:   Recent Labs     01/06/21  1045   WBC 25.1*   HGB 6.2*   HCT 19.7*   PLT 56*     Recent Labs     01/06/21  1045   BUN 44*   CREA 3.91*       Microbiology Data:  - Blood Cx 01/06: Pending     Imaging:   CXR 01/06:    Right greater than left pulmonary airspace edema and/or pneumonia. Small to  moderate size right pleural effusion. No pneumothorax. Assessment / Plan:     1. Sepsis presenting w AMS, likely from aspiration PNA w R>L infiltrate on CXR       Rapid test for COVID-19 is neg.  Elevated lactic acid, procal and WBC       Purulent urine noted after insertion of campuzano cath per ED staff, minimal urine in bag in and tubing       No obvious infected wounds or ulcers      Continue on Vanc and Cefepime for now. Routine labs in the morning     2. Acute on chronic anemia w Hgb of 6.2, PRBC transfusion ordered    3. Dental caries: Multiple decaying teeth, no abscess appreciated on exam      Unclear if reason for admission, was on amoxicillin as out pt       4. ESRD on HD, anuric, + campuzano inserted in ED, minimal out put     5. Thrombocytopenia: May be from sepsis syndrome, monitor for improvement     6. AMS on admission: Baseline mentation unknown, underlying dementia per records     7.  Elevated liver enzymes: ? From sepsis syndrome, will monitor for improvement     Cornelia Zeng MD     1/6/2021

## 2021-01-07 NOTE — ROUTINE PROCESS
TRANSFER - OUT REPORT:    Verbal report given to ARACELI Sung(name) on Francesca Marrero  being transferred to (unit) for routine progression of care       Report consisted of patients Situation, Background, Assessment and   Recommendations(SBAR). Information from the following report(s) SBAR, Kardex, Intake/Output, MAR and Recent Results was reviewed with the receiving nurse. Lines:   Peripheral IV 01/06/21 Anterior;Right External jugular (Active)       Peripheral IV 01/06/21 Anterior External jugular (Active)        Opportunity for questions and clarification was provided.       Patient transported with:   O2 @ 15 liters  Tech, telemetry, pt belongings and chart

## 2021-01-07 NOTE — ED NOTES
Pt sats 87-88% on 6L respiratory called to assist with  venti mask placement. Pt does not respond to 50% o2. TELE med doc called. Spoke with dr. Naylor Cargo orders given  For non rebeather. Pt responds to non rebreather 15L. Per dr mustafa continue with 15L. Pt not alert enough to tolerate bipap. Will continue to monitor.

## 2021-01-07 NOTE — CONSULTS
Consult    Patient: Leta Vargas MRN: [id number]  SSN: [id number]    YOB: 1955  Age: 76 y.o. Sex: female      Subjective:      Leta Vargas is a 76 y.o. female who is being seen for NSTEMI. Patient with vascular dementia, hypertension, gout, GERD, end-stage renal disease and hemodialysis, diabetes mellitus who presented with altered mental status. Her white count were severely elevated. The blood pressure was elevated and she was tachycardic. She is being admitted and treated for sepsis. I was consulted for elevated troponin. She received 1 unit of blood with improvement in hemoglobin. Patient is status post PEG tube. She is being treated for possible pneumonia. She tested negative for Covid and influenza. She is nonverbal.  She is morning in the emergency room.     Past Medical History:   Diagnosis Date    CHF (congestive heart failure) ([facility] [address] 10/2013    Chronic kidney disease     ESRD/ARF Dialysi - Sat    GERD (gastroesophageal reflux disease)     Hypertension     Insomnia     Severe mitral regurgitation 2013    Stroke [facility])     Urinary incontinence     [facility]ascular [facility] with depressed mood ([facility])      Past Surgical History:   Procedure Laterality Date    HX BACK SURGERY      3 herniated discs    HX  SECTION        Family History   Problem Relation Age of Onset    Diabetes Mother         Type 2    Heart Failure Father     Diabetes Sister         3 sisters   Griffin Diabetes Brother         3 brothers    Hypertension Daughter      Social History     Tobacco Use    Smoking status: Former Smoker     Types: Cigarettes     Quit date: 2013     Years since quittin.0    Smokeless tobacco: Never Used   Substance Use Topics    Alcohol use: No      Current Facility-Administered Medications   Medication Dose Route Frequency Provider Last Rate Last Admin    sodium chloride (NS) flush 5-10 mL  5-10 mL IntraVENous PRN ComforteeMaria A bagley MD [place] cefepime (MAXIPIME) 2 g in sterile water (preservative free) 10 mL IV syringe  2 g IntraVENous Q8H Jake Gold MD   2 g at 01/07/21 0523    VANCOMYCIN INFORMATION NOTE   Other PRN EstelarJake MD        Vancomycin random level to be drawn on 1/7/21 at 0600.    Other ONCE Jake Gold MD        0.9% sodium chloride infusion 250 mL  250 mL IntraVENous PRN Jake Gold MD        sodium chloride (NS) flush 5-40 mL  5-40 mL IntraVENous Q8H Angelita West NP   10 mL at 01/07/21 0134    sodium chloride (NS) flush 5-40 mL  5-40 mL IntraVENous PRN Angelita West NP        acetaminophen (TYLENOL) tablet 650 mg  650 mg Oral Q6H PRN LOVE Gonsalves acetaminophen (TYLENOL) suppository 650 mg  650 mg Rectal Q6H PRN Angelita West NP   650 mg at 01/06/21 1741    polyethylene glycol (MIRALAX) packet 17 g  17 g Oral DAILY PRN Angelita West NP        bisacodyL (DULCOLAX) tablet 5 mg  5 mg Oral DAILY PRN Angelita West NP        ondansetron (ZOFRAN ODT) tablet 4 mg  4 mg Oral Q6H PRN Angelita West NP        Or    [facility] mg  4 mg IntraVENous Q6H PRN Angelita West NP        vancomycin (VANCOCIN) 1,000 mg in 0.9% sodium chloride 250 mL (VIAL-MATE)  1,000 mg IntraVENous Q12H Angelita West NP   1,000 mg at 01/07/21 0133    0.9% sodium chloride infusion  125 mL/hr IntraVENous CONTINUOUS Angelita West NP        amiodarone (CORDARONE) tablet 200 mg  200 mg Oral DAILY Angelita West NP        aspirin chewable tablet 81 mg  81 mg Oral DAILY Angelita West NP        atorvastatin (LIPITOR) tablet 40 mg  40 mg Oral QHS Angelita West NP   40 mg at 01/07/21 0133    clopidogreL (PLAVIX) tablet 75 mg  75 mg PEG Tube DAILY Angelita West NP        pantoprazole (PROTONIX) granules for oral suspension 40 mg  40 mg Per NG tube DAILY Angelita West NP        ferrous sulfate 300 mg (60 mg iron)/5 mL oral syrup 300 mg  300 mg Oral BID Jakob Tobin NP   300 mg at 01/07/21 0134     Current Outpatient Medications   Medication Sig Dispense Refill    amoxicillin-clavulanate (AUGMENTIN) 600-42.9 mg/5 mL suspension 7 mL by Per G Tube route two (2) times a day for 10 days. 140 mL 0    metoprolol tartrate (LOPRESSOR) 50 mg tablet Take 1 tablet by mouth twice daily 180 Tab 0    atorvastatin (LIPITOR) 40 mg tablet Take 1 tablet by mouth once daily 90 Tab 0    clopidogreL (PLAVIX) 75 mg tab Take 1 tablet by mouth once daily 90 Tab 0    amiodarone (CORDARONE) 200 mg tablet Take 1 tablet by mouth twice daily 180 Tab 0    traZODone (DESYREL) 50 mg tablet TAKE 1 TAB PER G TUBE AT BEDTIME 90 Tab 1    LORazepam (ATIVAN) 0.5 mg tablet Take 1 Tab by mouth every Monday, Wednesday, Friday. Max Daily Amount: 0.5 mg. Prior to dialysis. 30 Tab 0    ondansetron hcl (Zofran) 4 mg tablet Take 1 Tab by mouth every six (6) hours as needed for Nausea or Nausea or Vomiting. 30 Tab 1    OTHER,NON-FORMULARY, 1 hospital bed mattress. 1 Each 0    OTHER,NON-FORMULARY, 1 tub transfer bed. 1 Each 0    Bedside Commode XX 1 3-in-1 bedside commode. Fax to [facility] [phone] 1 Each 0    Nut. Tx. Impaired Renal Fxn,Soy (Nepro Carb Steady) 0.08 gram-1.8 kcal/mL liqd 4 oz by Gastrostomy Tube route five (5) times daily. 600 Can 5    nystatin (MYCOSTATIN) powder Apply  to affected area four (4) times daily. 2 Bottle 1    lactobacillus-acidophilus (LACTINEX) 100 million cell grpk Take 1 Packet by mouth two (2) times a day. 12 Packet 2    cholecalciferol (VITAMIN D3) (50,000 UNITS /1250 MCG) capsule Take 1 capsule every Monday and Friday 27 Cap 2    ferrous sulfate 325 mg (65 mg iron) tablet Take 1 Tab by mouth two (2) times a day. 180 Tab 1    pantoprazole (Protonix) 40 mg granules for oral suspension Take 40 mg by mouth daily. 90 Each 1    NUT. TX.IMPAIRED RENAL FXN,SOY (NEPRO PO) Take  by mouth.  1.5 CANS VIA FEEDING TUBE EVERY 4 HOURS PER DAUGHTER      aspirin 81 mg chewable tablet Take 81 mg by mouth daily. No Known Allergies    Review of Systems:    Unable to obtain  Objective:     Vitals:    01/06/21 2243 01/07/21 0134 01/07/21 0317 01/07/21 0528   BP: (!) 143/82 (!) 148/91 117/80 (!) 149/88   Pulse: (!) 122 (!) 120 (!) 119 (!) 128   Resp: 23 30 29 (!) 31   Temp:  98.9 °F (37.2 °C)     SpO2: 95% 99% 99% 95%   Weight:       Height:            Physical Exam:  General:   She appears to be sick, older than stated age. Eyes:  Conjunctivae/corneas clear. PERRL, EOMs intact. Fundi benign   Ears:  Normal TMs and external ear canals both ears. Nose: Nares normal. Septum midline. Mucosa normal. No drainage or sinus tenderness. Mouth/Throat: Lips, mucosa, and tongue normal. Teeth and gums normal.   Neck: Supple, symmetrical, trachea midline, no adenopathy, thyroid: no enlargment/tenderness/nodules, no carotid bruit and no JVD. Back:   Symmetric, no curvature. ROM normal. No CVA tenderness. Lungs:   Clear to auscultation bilaterally. Heart:   Tachycardic. Abdomen:    PEG tube. Extremities: Extremities normal, atraumatic, no cyanosis or edema. Pulses: 2+ and symmetric all extremities. Skin: Skin color, texture, turgor normal. No rashes or lesions   Lymph nodes: Cervical, supraclavicular, and axillary nodes normal.   Neurologic: In contracture position         Assessment:     Hospital Problems  Date Reviewed: 1/4/2021          Codes Class Noted POA    Hypothermia ICD-10-CM: T68. XXXA  ICD-9-CM: 991.6  1/6/2021 Unknown        Hypotension ICD-10-CM: I95.9  ICD-9-CM: 458.9  1/6/2021 Unknown        Sepsis ([facility] [address] ICD-10-CM: A41.9  ICD-9-CM: 038.9, 995.91  1/6/2021 Unknown        Dysphagia ICD-10-CM: R13.10  ICD-9-CM: 787.20  6/23/2020 Yes        Hemiplegia following cerebrovascular accident (CVA) ([facility] ICD-10-CM: Q34.882  ICD-9-CM: 438.20  6/17/2020 Yes        Gastrostomy tube dependent ([facility] ICD-10-CM: Z93.1  ICD-9-CM: V44.1 6/17/2020 Yes        Anemia of chronic disease ICD-10-CM: D63.8  ICD-9-CM: 285.29  6/17/2020 Yes        Iron deficiency anemia ICD-10-CM: D50.9  ICD-9-CM: 280.9  6/17/2020 Yes        CKD (chronic kidney disease) requiring chronic dialysis [facility]) ICD-10-CM: N18.6, Z99.2  ICD-9-CM: 585.6, V45.11  Unknown Yes    Overview Signed 9/19/2018  8:57 AM by Irineo Mcmillan LPN     ESRD/ARF Dialysi Tues-Thurs- Sat             Hypertension ICD-10-CM: I10  ICD-9-CM: 401.9  Unknown Yes        Vascular dementia with depressed mood ([facility] ICD-10-CM: F01.51, F32.9  ICD-9-CM: 290.43  9/6/2018 Yes            Patient is a 67-year-old -American female with:  1. Leukocytosis  2. Anemia  3. Thrombocytopenia  4. End-stage renal disease on hemodialysis  5. Non-STEMI  6. Sepsis  7. CVA with residual weakness, wheelchair-bound  8. Dementia  9. Failure to thrive status post PEG tube  10. DNR  11. Mitral regurgitation    Plan:   Patient is white count up to 20 7.5K, hemoglobin has improved from 6.2-8.4, and platelets were 82I. INR 1.5. Potassium is 4.5, sodium 134, creatinine 4.3, BUN is 56. Procalcitonin is elevated. Lactic acid is high. COVID-19 and influenza are negative. Anyhow chest x-ray showed cardiomegaly, vascular congestion. Right pleural effusion. EKG shows sinus tachycardia, anterior infarct age undetermined. Her troponin probably elevated secondary to demand ischemia. Patient is tachycardic. She appears sickly and ED. She is currently DNR. We will continue to trend troponin. Due to severe thrombocytopenia I am limited by starting heparin GTT. Echocardiogram pending. Currently on amiodarone, aspirin, atorvastatin, Plavix. Is a report of severe mitral regurgitation. Anyhow cannot access previous health system. Continue supportive care for now. Get an echocardiogram.    Thank you  For morning Mrs. Garcia care. I will follow.       Signed By: Gwendolyn Penaloza MD     January 7, 2021         I discussed with daughter Lisa Sanchez at [phone] regarding the results of echocardiogram and large vegetation noted on the mitral valve. Discussed with Dr. Mal Roman and Ms. Laura Morrison the primary team regarding the findings. Patient is sick with multiple comorbidities including history of CVA, wheelchair-bound, dementia, mitral regurgitation, end-stage renal disease and thrombocytopenia/anemia. She is not a candidate for any surgical intervention at this point. The goal is antibiotics tailored by ID. We will continue to follow. Avoid heparin for now due to worries of seeding the cerebral circulations. Patient is DNR. The daughter might be considering comfort care measures.

## 2021-01-07 NOTE — PROGRESS NOTES
Consult for Vancomycin Dosing by Pharmacy by Dr. Gold/ Dr. Kanu López provided for this 76y.o. year old female , for indication of sepsis. Day of Therapy: 2  Goal of Level(s): 15-20mcg/dL    Other Current Antibiotics: Zosyn    Significant Cultures:     Results       Procedure Component Value Units Date/Time    COVID-19 RAPID TEST [[id number]] Collected: 01/06/21 1750    Order Status: Completed Specimen: Nasopharyngeal Updated: 01/06/21 1831     Specimen source Nasopharyngeal        COVID-19 rapid test Not Detected        Comment: Rapid Abbott ID Now   Rapid NAAT:  The specimen is NEGATIVE for SARS-CoV-2, the novel coronavirus associated with COVID-19. Negative results should be treated as presumptive and, if inconsistent with clinical signs and symptoms or necessary for patient management, should be tested with an alternative molecular assay. Negative results do not preclude SARS-CoV-2 infection and should not be used as the sole basis for patient management decisions. This test has been authorized by the FDA under   an Emergency Use Authorization (EUA) for use by authorized laboratories. Fact sheet for Healthcare Providers: [facility] Fact sheet for Patients: [employer]   Methodology: Isothermal Nucleic Acid Amplification  Corrected on 01/06 AT 1831: Previously reported as Negative Rapid Abbott ID Now   Rapid NAAT:  The specimen is NEGATIVE for SARS CoV 2, the novel coronavirus associated with COVID 19. Negative results should be treated as presumptive and, if inconsistent with clinical signs and symptoms or necessary for patient management, should be tested with an alternative molecular assay. Negative results do not preclude SARS CoV 2 infection and should not be used as the sole basis for patient management   decisions.    This test has been authorized by the FDA under an Emergency Use Authorization (EUA) for use by authorized laboratories. Fact sheet for Healthcare Providers: [employer]nz Fact sheet for Patients: [employer]nz   Methodology: Isothermal Nucleic Acid Amplification         INFLUENZA A & B AG (RAPID TEST) [[id number]] Collected: 01/06/21 1250    Order Status: Completed Specimen: Nasal washing Updated: 01/06/21 1354     Influenza A Antigen Negative        Influenza B Antigen Negative       CULTURE, BLOOD [[id number]] Collected: 01/06/21 1045    Order Status: Completed Specimen: Blood Updated: 01/07/21 1240     Special Requests: No Special Requests        Culture result:       one of two bottles has been flagged positive by instrument. Bottle has been sent to [facility] laboratory to assess for possible growth. Gram Positive Cocci CHAINS            CALLED TO Connie Delgadillo 01/07/21 IN ER     CULTURE, BLOOD [[id number]] Collected: 01/06/21 1045    Order Status: Completed Specimen: Blood Updated: 01/07/21 0803     Special Requests: No Special Requests        Culture result: No growth after 19 hours       INFLUENZA A & B AG (RAPID TEST) [[id number]] Collected: 01/06/21 1045    Order Status: Canceled Specimen: Nasopharyngeal from Nasal washing               Serum Creatinine Creatinine   Date Value Ref Range Status   01/07/2021 4.39 (H) 0.55 - 1.02 mg/dL Final   01/06/2021 3.91 (H) 0.55 - 1.02 mg/dL Final   12/03/2020 3.31 (H) 0.55 - 1.02 mg/dL Final      Creatinine Clearance Estimated Creatinine Clearance: 8.1 mL/min (A) (based on SCr of 4.39 mg/dL (H)).    BUN Lab Results   Component Value Date/Time    BUN 56 (H) 01/07/2021 04:05 AM    BUN (POC) 51 (H) 08/23/2019 01:52 PM      WBC Lab Results   Component Value Date/Time    WBC 27.5 (H) 01/07/2021 04:05 AM    WBC 27.8 (H) 01/07/2021 04:05 AM      Temp 99.4 °F (37.4 °C) (Axillary)     Last Level: No results found for: VANCT   Vancomycin, random   Date Value Ref Range Status   01/07/2021 28.2 ug/mL Final     Comment:     No reference range has been established. Ht Readings from Last 1 Encounters:   01/07/21 165.1 cm (65\")        Wt Readings from Last 1 Encounters:   01/07/21 40.8 kg (89 lb 15.2 oz)     Ideal body weight: 59.2 kg (130 lb 8.1 oz)     New Regimen: This morning random vancomycin = 28.2 after 750mg 1/6 11:10, and 1000mg 1/7 01:33. Patient had HD this afternoon, with calculated vancomycin=17 approximately. No dose of vancomycin today. Random vancomycin level 1/9 AM before HD. Pharmacy to follow daily and will make changes to dose and/or frequency based on clinical status.     _________________________________     Pharmacist Anna Dash [facility]

## 2021-01-07 NOTE — DIALYSIS
Pt tolerated tx well. A net total of 2 liters removed. attempted to reach out to ED to find out what room patient would be going to, unable to get an answer. Transported to ED by transport. Transporter True aware.

## 2021-01-07 NOTE — PROGRESS NOTES
Infectious Disease Progress Note               Subjective:   Pt seen and examined at bedside. Remains in the ED awaiting bed assignment on the floors. Currently on the Ventimask, unable to clear secretions. Rising WBC on routine labs  Objective:   Physical Exam:     Visit Vitals  BP (!) 144/96   Pulse (!) 129   Temp 98.9 °F (37.2 °C)   Resp (!) 31   Ht 5' 5\" (1.651 m)   Wt 90 lb (40.8 kg)   SpO2 95%   BMI 14.98 kg/m²    O2 Flow Rate (L/min): 15 l/min O2 Device: Room air    Temp (24hrs), Av.3 °F (37.4 °C), Min:97.6 °F (36.4 °C), Max:99.9 °F (37.7 °C)    No intake/output data recorded.  1901 -  0700  In:  [I.V.:1000]  Out: -     General: NAD, decreased responsiveness, intermittent moaning  HEENT: GINA, Moist mucosa, ventimask in place   Lungs: CTA b/l, decreased at the bases, no wheeze/rhonchi  Heart: S1S2+, RRR, no murmur  Abdo: Soft, NT, ND, +BS, + peg tube   Exts: No edema, + pulses b/l   Skin: No wounds, No rashes or lesions      Data Review:       Recent Days:  Recent Labs     21  0405 21  1045   WBC 27.5*  27.8* 25.1*   HGB 8.4*  8.6* 6.2*   HCT 26.5*  25.1* 19.7*   PLT 70*  70* 56*     Recent Labs     21  0405 21  1045   BUN 56* 44*   CREA 4.39* 3.91*       Lab Results   Component Value Date/Time    C-Reactive protein 14.70 (H) 2021 04:05 AM      Microbiology   - Blood Cx : Pending     Diagnostics   CXR Results  (Last 48 hours)               21 1117  XR CHEST PORT Final result    Impression:  FINDINGS: Impression: Frontal supine single view chest does not include the left   lower lateral lung. Right dialysis catheter distal tip right atrium. Cardiomegaly, vascular congestion. Right greater than left pulmonary airspace edema and/or pneumonia. Small to   moderate size right pleural effusion. No pneumothorax. No acute bony findings. Narrative: Infiltrate.  Emergency Department note altered mental status. Comparison chest x-ray 12/3/2020. Assessment/Plan       1. Sepsis presenting w AMS, likely from aspiration PNA w R>L infiltrate on CXR       Rapid test for COVID-19 is neg. Maintaining O2 sats on nonrebreather      Afebrile, rising WBC on today's labs, normothermic, tachycardic, procal trending down       Broaden antimicrobial coverage with Zosyn, d/c Cefepime, continue no Vanc       F/u pending BCx. Repeat CXR today given increased O2 requirements       Routine labs in the morning         2. Acute on chronic anemia w Hgb of 6.2, improved after PRBC transfusion     3. Dental caries: Multiple decaying teeth, no abscess appreciated on exam      4. ESRD on HD, anuric, + campuzano inserted in ED, minimal out put      5. Thrombocytopenia: ? Due to sepsis, PLT trending up on todays labs      6. AMS on admission: Remains confused with intermittent moaning, underlying dementia     7. Elevated liver enzymes:  Will repeat w AM labs     Stu Greenwood MD    1/7/2021 1

## 2021-01-07 NOTE — PROGRESS NOTES
Hospitalist Progress Note    Subjective:   Daily Progress Note: 1/7/2021 10:36 AM    Hospital Course:     Dyanne Boeck is a 76 y.o. female who Admitted on [id number]. PMH never came for chronic renal failure, arthritis, DM, ESRD on HD, GERD, gout, hypertension, iron deficiency anemia, dementia, wheelchair-bound. Family called EMS after she became lethargic nonresponsive at home. Patiently seen outpatient for dental infection and placed on amoxicillin 2 days ago. EMS found her hypotensive systolic blood pressure in the 70s and tachycardic. In the ED she is hypothermic at 90.5, tachycardic 124, breathing 20-40 times, BP 80/57. Chest x-ray cardiomegaly, vascular congestion are >L airspace edema and/or pneumonia, Small to moderate size right pleural effusion. Significant lab values hemoglobin 6.2, platelets 56, lactic acid 3.1, procalcitonin 20.24, troponin 1.97. Will admit for further management of presumed sepsis, hypotension, hypothermia, tachycardia, tachypneic and non-STEMI. Will start on IV vancomycin and cefepime empirically. Consult ID. Will transfuse 1 unit PRBC. Cardiology consulted. 2D echo pending. Received 1 unit PRBC for hemoglobin 6.2, diuresed gently after blood products. Subjective: Follow-up examination of patient at the bedside. She is tachypneic and tachycardic. Currently on 100% nonrebreather. She is moaning. Current Facility-Administered Medications   Medication Dose Route Frequency    sodium chloride (NS) flush 5-10 mL  5-10 mL IntraVENous PRN    cefepime (MAXIPIME) 2 g in sterile water (preservative free) 10 mL IV syringe  2 g IntraVENous Q8H    VANCOMYCIN INFORMATION NOTE   Other PRN    Vancomycin random level to be drawn on 1/7/21 at 0600.    Other ONCE    0.9% sodium chloride infusion 250 mL  250 mL IntraVENous PRN    sodium chloride (NS) flush 5-40 mL  5-40 mL IntraVENous Q8H    sodium chloride (NS) flush 5-40 mL  5-40 mL IntraVENous PRN    acetaminophen (TYLENOL) tablet 650 mg  650 mg Oral Q6H PRN    Or    acetaminophen (TYLENOL) suppository 650 mg  650 mg Rectal Q6H PRN    polyethylene glycol (MIRALAX) packet 17 g  17 g Oral DAILY PRN    bisacodyL (DULCOLAX) tablet 5 mg  5 mg Oral DAILY PRN    ondansetron (ZOFRAN ODT) tablet 4 mg  4 mg Oral Q6H PRN    Or    ondansetron (ZOFRAN) injection 4 mg  4 mg IntraVENous Q6H PRN    vancomycin (VANCOCIN) 1,000 mg in 0.9% sodium chloride 250 mL (VIAL-MATE)  1,000 mg IntraVENous Q12H    0.9% sodium chloride infusion  125 mL/hr IntraVENous CONTINUOUS    amiodarone (CORDARONE) tablet 200 mg  200 mg Oral DAILY    aspirin chewable tablet 81 mg  81 mg Oral DAILY    atorvastatin (LIPITOR) tablet 40 mg  40 mg Oral QHS    clopidogreL (PLAVIX) tablet 75 mg  75 mg PEG Tube DAILY    pantoprazole (PROTONIX) granules for oral suspension 40 mg  40 mg Per NG tube DAILY    ferrous sulfate 300 mg (60 mg iron)/5 mL oral syrup 300 mg  300 mg Oral BID     Current Outpatient Medications   Medication Sig    amoxicillin-clavulanate (AUGMENTIN) 600-42.9 mg/5 mL suspension 7 mL by Per G Tube route two (2) times a day for 10 days.  metoprolol tartrate (LOPRESSOR) 50 mg tablet Take 1 tablet by mouth twice daily    atorvastatin (LIPITOR) 40 mg tablet Take 1 tablet by mouth once daily    clopidogreL (PLAVIX) 75 mg tab Take 1 tablet by mouth once daily    amiodarone (CORDARONE) 200 mg tablet Take 1 tablet by mouth twice daily    traZODone (DESYREL) 50 mg tablet TAKE 1 TAB PER G TUBE AT BEDTIME    LORazepam (ATIVAN) 0.5 mg tablet Take 1 Tab by mouth every Monday, Wednesday, Friday. Max Daily Amount: 0.5 mg. Prior to dialysis.  ondansetron hcl (Zofran) 4 mg tablet Take 1 Tab by mouth every six (6) hours as needed for Nausea or Nausea or Vomiting.  OTHER,NON-FORMULARY, 1 hospital bed mattress.  OTHER,NON-FORMULARY, 1 tub transfer bed.  Bedside Commode XX 1 3-in-1 bedside commode.  Fax to [facility] [phone]   Olga Lidia Quintero Nut. Tx. Impaired Renal Fxn,Soy (Nepro Carb Steady) 0.08 gram-1.8 kcal/mL liqd 4 oz by Gastrostomy Tube route five (5) times daily.  nystatin (MYCOSTATIN) powder Apply  to affected area four (4) times daily.  lactobacillus-acidophilus (LACTINEX) 100 million cell grpk Take 1 Packet by mouth two (2) times a day.  cholecalciferol (VITAMIN D3) (50,000 UNITS /1250 MCG) capsule Take 1 capsule every Monday and Friday    ferrous sulfate 325 mg (65 mg iron) tablet Take 1 Tab by mouth two (2) times a day.  pantoprazole (Protonix) 40 mg granules for oral suspension Take 40 mg by mouth daily.  NUT. TX.IMPAIRED RENAL FXN,SOY (NEPRO PO) Take  by mouth. 1.5 CANS VIA FEEDING TUBE EVERY 4 HOURS PER DAUGHTER    aspirin 81 mg chewable tablet Take 81 mg by mouth daily. REVIEW OF SYSTEMS    Review of Systems   Unable to perform ROS: Dementia        Objective:     Visit Vitals  BP (!) 144/96   Pulse (!) 129   Temp 98.9 °F (37.2 °C)   Resp (!) 31   Ht 5' 5\" (1.651 m)   Wt 40.8 kg (90 lb)   SpO2 95%   BMI 14.98 kg/m²    O2 Flow Rate (L/min): 15 l/min O2 Device: Room air    Temp (24hrs), Av.4 °F (36.9 °C), Min:93 °F (33.9 °C), Max:99.9 °F (37.7 °C)      No intake/output data recorded.  1901 -  0700  In: 2000 [I.V.:1000]  Out: -     PHYSICAL EXAM:    Physical Exam  Constitutional:       General: She is in acute distress. Appearance: She is ill-appearing and toxic-appearing. Cardiovascular:      Rate and Rhythm: Tachycardia present. Pulmonary:      Effort: Respiratory distress present. Abdominal:      Comments: PEG tube   Neurological:      Mental Status: She is disoriented. Motor: Weakness present.           Data Review    Recent Results (from the past 24 hour(s))   CULTURE, BLOOD    Collection Time: 21 10:45 AM    Specimen: Blood   Result Value Ref Range    Special Requests: No Special Requests      Culture result: No growth after 19 hours     CULTURE, BLOOD    Collection Time: 01/06/21 10:45 AM    Specimen: Blood   Result Value Ref Range    Special Requests: No Special Requests      Culture result: No growth after 19 hours     METABOLIC PANEL, COMPREHENSIVE    Collection Time: 01/06/21 10:45 AM   Result Value Ref Range    Sodium 133 (L) 136 - 145 mmol/L    Potassium 3.9 3.5 - 5.1 mmol/L    Chloride 96 (L) 97 - 108 mmol/L    CO2 26 21 - 32 mmol/L    Anion gap 11 5 - 15 mmol/L    Glucose 137 (H) 65 - 100 mg/dL    BUN 44 (H) 6 - 20 mg/dL    Creatinine 3.91 (H) 0.55 - 1.02 mg/dL    BUN/Creatinine ratio 11 (L) 12 - 20      GFR est AA 14 (L) >60 ml/min/1.73m2    GFR est non-AA 12 (L) >60 ml/min/1.73m2    Calcium 8.1 (L) 8.5 - 10.1 mg/dL    Bilirubin, total 0.8 0.2 - 1.0 mg/dL    AST (SGOT) 105 (H) 15 - 37 U/L    ALT (SGPT) 66 12 - 78 U/L    Alk. phosphatase 197 (H) 45 - 117 U/L    Protein, total 5.9 (L) 6.4 - 8.2 g/dL    Albumin 1.8 (L) 3.5 - 5.0 g/dL    Globulin 4.1 (H) 2.0 - 4.0 g/dL    A-G Ratio 0.4 (L) 1.1 - 2.2     CBC WITH AUTOMATED DIFF    Collection Time: 01/06/21 10:45 AM   Result Value Ref Range    WBC 25.1 (H) 3.6 - 11.0 K/uL    RBC 2.64 (L) 3.80 - 5.20 M/uL    HGB 6.2 (L) 11.5 - 16.0 g/dL    HCT 19.7 (L) 35.0 - 47.0 %    MCV 74.6 (L) 80.0 - 99.0 FL    MCH 23.5 (L) 26.0 - 34.0 PG    MCHC 31.5 30.0 - 36.5 g/dL    RDW 22.1 (H) 11.5 - 14.5 %    PLATELET 56 (L) 818 - 400 K/uL    EOSINOPHILS 0 0 - 7 %    BASOPHILS 0 0 - 1 %    IMMATURE GRANULOCYTES 0 0.0 - 0.5 %    ABS. EOSINOPHILS 0.0 0.0 - 0.4 K/UL    ABS. BASOPHILS 0.0 0.0 - 0.1 K/UL    ABS. IMM. GRANS. 0 0.00 - 0.04 K/UL    NEUTROPHILS 89 (H) 32 - 75 %    BAND NEUTROPHILS 1 0 - 6 %    LYMPHOCYTES 2 (L) 12 - 49 %    MONOCYTES 8 5 - 13 %    ABS. NEUTROPHILS 22.6 (H) 1.8 - 8.0 K/UL    ABS. LYMPHOCYTES 0.5 (L) 0.8 - 3.5 K/UL    ABS.  MONOCYTES 2.0 (H) 0.0 - 1.0 K/UL    DF AUTOMATED      RBC COMMENTS Anisocytosis  1+        RBC COMMENTS Poikilocytosis  1+       TROPONIN I    Collection Time: 01/06/21 10:45 AM   Result Value Ref Range Troponin-I, Qt. 1.97 (H) <0.05 ng/mL   MAGNESIUM    Collection Time: 01/06/21 10:45 AM   Result Value Ref Range    Magnesium 2.5 (H) 1.6 - 2.4 mg/dL   PROTHROMBIN TIME + INR    Collection Time: 01/06/21 10:45 AM   Result Value Ref Range    Prothrombin time 18.2 (H) 11.9 - 14.7 sec    INR 1.5 (H) 0.9 - 1.1     PROCALCITONIN    Collection Time: 01/06/21 10:45 AM   Result Value Ref Range    Procalcitonin 20.24 (H) 0 ng/mL   LACTIC ACID    Collection Time: 01/06/21 11:40 AM   Result Value Ref Range    Lactic acid 3.1 (HH) 0.4 - 2.0 mmol/L   INFLUENZA A & B AG (RAPID TEST)    Collection Time: 01/06/21 12:50 PM   Result Value Ref Range    Influenza A Antigen Negative Negative      Influenza B Antigen Negative Negative     TYPE & SCREEN    Collection Time: 01/06/21 12:50 PM   Result Value Ref Range    Crossmatch Expiration 01/09/2021,2359     ABO/Rh(D) O Positive     Antibody screen Negative     Unit number [id number]     Blood component type  LR     Unit division 00     Status of unit Issued,final     TRANSFUSION STATUS Ok to transfuse     Crossmatch result Compatible    SARS-COV-2    Collection Time: 01/06/21  5:50 PM   Result Value Ref Range    SARS-CoV-2 Please find results under separate order     COVID-19 RAPID TEST    Collection Time: 01/06/21  5:50 PM   Result Value Ref Range    Specimen source Nasopharyngeal      COVID-19 rapid test Not Detected Not Detected     METABOLIC PANEL, BASIC    Collection Time: 01/07/21  4:05 AM   Result Value Ref Range    Sodium 134 (L) 136 - 145 mmol/L    Potassium 4.5 3.5 - 5.1 mmol/L    Chloride 100 97 - 108 mmol/L    CO2 24 21 - 32 mmol/L    Anion gap 10 5 - 15 mmol/L    Glucose 104 (H) 65 - 100 mg/dL    BUN 56 (H) 6 - 20 mg/dL    Creatinine 4.39 (H) 0.55 - 1.02 mg/dL    BUN/Creatinine ratio 13 12 - 20      GFR est AA 12 (L) >60 ml/min/1.73m2    GFR est non-AA 10 (L) >60 ml/min/1.73m2    Calcium 8.2 (L) 8.5 - 10.1 mg/dL   CBC W/O DIFF    Collection Time: 01/07/21  4:05 AM Result Value Ref Range    WBC 27.5 (H) 3.6 - 11.0 K/uL    RBC 3.39 (L) 3.80 - 5.20 M/uL    HGB 8.4 (L) 11.5 - 16.0 g/dL    HCT 26.5 (L) 35.0 - 47.0 %    MCV 78.2 (L) 80.0 - 99.0 FL    MCH 24.8 (L) 26.0 - 34.0 PG    MCHC 31.7 30.0 - 36.5 g/dL    RDW 21.5 (H) 11.5 - 14.5 %    PLATELET 70 (L) 486 - 400 K/uL   CBC WITH AUTOMATED DIFF    Collection Time: 01/07/21  4:05 AM   Result Value Ref Range    WBC 27.8 (H) 3.6 - 11.0 K/uL    RBC 3.20 (L) 3.80 - 5.20 M/uL    HGB 8.6 (L) 11.5 - 16.0 g/dL    HCT 25.1 (L) 35.0 - 47.0 %    MCV 78.4 (L) 80.0 - 99.0 FL    MCH 26.9 26.0 - 34.0 PG    MCHC 34.3 30.0 - 36.5 g/dL    RDW 21.6 (H) 11.5 - 14.5 %    PLATELET 70 (L) 779 - 400 K/uL    NEUTROPHILS 93 (H) 32 - 75 %    LYMPHOCYTES 2 (L) 12 - 49 %    MONOCYTES 4 (L) 5 - 13 %    EOSINOPHILS 0 0 - 7 %    BASOPHILS 0 0 - 1 %    IMMATURE GRANULOCYTES 1 (H) 0.0 - 0.5 %    ABS. NEUTROPHILS 26.1 (H) 1.8 - 8.0 K/UL    ABS. LYMPHOCYTES 0.7 (L) 0.8 - 3.5 K/UL    ABS. MONOCYTES 1.0 0.0 - 1.0 K/UL    ABS. EOSINOPHILS 0.0 0.0 - 0.4 K/UL    ABS. BASOPHILS 0.0 0.0 - 0.1 K/UL    ABS. IMM. GRANS. 0.1 (H) 0.00 - 0.04 K/UL    DF AUTOMATED     C REACTIVE PROTEIN, QT    Collection Time: 01/07/21  4:05 AM   Result Value Ref Range    C-Reactive protein 14.70 (H) 0.00 - 0.60 mg/dL   PROCALCITONIN    Collection Time: 01/07/21  4:05 AM   Result Value Ref Range    Procalcitonin 17.00 (H) 0 ng/mL       XR CHEST PORT   Final Result   FINDINGS: Impression: Frontal supine single view chest does not include the left   lower lateral lung. Right dialysis catheter distal tip right atrium. Cardiomegaly, vascular congestion. Right greater than left pulmonary airspace edema and/or pneumonia. Small to   moderate size right pleural effusion. No pneumothorax. No acute bony findings.           Active Problems:    Vascular dementia with depressed mood ([facility] [address] (9/6/2018)      CKD (chronic kidney disease) requiring chronic dialysis (HCC) ()      Overview: ESRD/ARF Dialysi [facility]      Hypertension ()      Hemiplegia following cerebrovascular accident (CVA) ([facility] [address] (6/17/2020)      Gastrostomy tube dependent ([facility] [address] (6/17/2020)      Anemia of chronic disease (6/17/2020)      Iron deficiency anemia (6/17/2020)      Dysphagia (6/23/2020)      Hypothermia (1/6/2021)      Hypotension (1/6/2021)      Sepsis ([facility] [address] (1/6/2021)        Assessment/Plan:     1. Sepsis:  · Hypothermic, hypotensive, Negative, tachycardic with altered mental status  · Received in ED 1 L IV bolus  · Blood and urine culture pending  · Started IV vancomycin and cefepime  · ID consulted     2. ESRD on HD:  · Consult nephrology  · Monitor labs     3. Respiratory distress with hypoxia:  4. Tachypnea:  5. COVID-19 test pending:  · Currently on 100% nonrebreather   · chest x-ray reveals right greater than left pulmonary airspace edema and/or pneumonia, small to moderate right pleural effusion     6. CVA with residual: Wheelchair-bound  7. Dementia: With behavioral disturbance  8. Failure to thrive:  · PEG tube for nutrition and hydration support  · Lives at home with family     9. Non-STEMI:  10.  Elevated troponin:  11.  History of severe MR  · Consult cardiology  · 2D echo pending    12. Acute anemia:  · Hemoglobin 6.2 at admission, received 1 unit PRBC>8.4 this morning    Addendum: 2D echocardiogram revealing large mitral thrombus. Prognosis poor. Discussed findings with family, POA daughter. Decision to make their mother comfort care after the family visits. DVT Prophylaxis: Wheelchair-bound not needed  Code Status: DNR  POA/NOK:  ______________________________________________________________________________  Time spent in direct care including coordination of service, review of data and examination: > 35 minutes  Care Plan discussed with: RN, consults and daughters.   Her family visits we can withdrawal all treatment care and focus on comfort care measures only including oxygen nutrition and pain management.    ______________________________________________________________________________    Porfirio Luis NP    This is dictation was done by [employer] voice recognition software. Quite often unanticipated grammatical, syntax, homophones and other interpretive errors or inadvertently transcribed by the computer software. Please excuse errors that have escaped final proofreading. Thank you.

## 2021-01-07 NOTE — ED NOTES
Spoke with attending hafsa, about pt status. Pt decrease in o2 with increase up to 6l per minute NC. Verbal order given for 20mg iv lasix now pending pt blood  Pressure.       Will continue to monitor

## 2021-01-08 NOTE — PROGRESS NOTES
Hospitalist Progress Note    Subjective:   Daily Progress Note: 1/8/2021 10:36 AM    Hospital Course:     Vivian Dasilva is a 76 y.o. female who Admitted on [id number]. PMH never came for chronic renal failure, arthritis, DM, ESRD on HD, GERD, gout, hypertension, iron deficiency anemia, dementia, wheelchair-bound. Family called EMS after she became lethargic nonresponsive at home. Patiently seen outpatient for dental infection and placed on amoxicillin 2 days ago. EMS found her hypotensive systolic blood pressure in the 70s and tachycardic. In the ED she is hypothermic at 90.5, tachycardic 124, breathing 20-40 times, BP 80/57. Chest x-ray cardiomegaly, vascular congestion are >L airspace edema and/or pneumonia, Small to moderate size right pleural effusion. Significant lab values hemoglobin 6.2, platelets 56, lactic acid 3.1, procalcitonin 20.24, troponin 1.97. Will admit for further management of presumed sepsis, hypotension, hypothermia, tachycardia, tachypneic and non-STEMI. Will start on IV vancomycin and cefepime empirically. Consult ID. Will transfuse 1 unit PRBC. Cardiology consulted. 2D echo pending. Received 1 unit PRBC for hemoglobin 6.2, diuresed gently after blood products. Cardiogram revealing large flopping thrombus on mitral valve. Poor prognosis not a surgical candidate. Family has decided on comfort care. Will discuss hospice and provide information to family. Subjective: Follow-up examination of patient at the bedside. Family at the bedside. Resting comfortably.   Current Facility-Administered Medications   Medication Dose Route Frequency    ondansetron (ZOFRAN ODT) tablet 4 mg  4 mg Oral Q6H PRN    haloperidol (HALDOL) 2 mg/mL oral solution 2 mg  2 mg SubLINGual Q6H PRN    Or    haloperidol lactate (HALDOL) injection 2 mg  2 mg IntraVENous Q6H PRN    LORazepam (INTENSOL) 2 mg/mL oral concentrate 1 mg  1 mg Oral Q4H PRN    scopolamine (TRANSDERM-SCOP) 1 mg over 3 days 1 Patch 1 Patch TransDERmal Q72H PRN    morphine injection 2 mg  2 mg IntraVENous Q2H PRN    sodium chloride (NS) flush 5-10 mL  5-10 mL IntraVENous PRN    0.9% sodium chloride infusion 250 mL  250 mL IntraVENous PRN    sodium chloride (NS) flush 5-40 mL  5-40 mL IntraVENous PRN    acetaminophen (TYLENOL) tablet 650 mg  650 mg Oral Q6H PRN    Or    acetaminophen (TYLENOL) suppository 650 mg  650 mg Rectal Q6H PRN    polyethylene glycol (MIRALAX) packet 17 g  17 g Oral DAILY PRN    bisacodyL (DULCOLAX) tablet 5 mg  5 mg Oral DAILY PRN    ondansetron (ZOFRAN ODT) tablet 4 mg  4 mg Oral Q6H PRN    Or    ondansetron (ZOFRAN) injection 4 mg  4 mg IntraVENous Q6H PRN        REVIEW OF SYSTEMS    Review of Systems   Unable to perform ROS: Dementia        Objective:     Visit Vitals  /71   Pulse (!) 104   Temp 99.1 °F (37.3 °C)   Resp 18   Ht 5' 5\" (1.651 m)   Wt 40.8 kg (89 lb 15.2 oz)   SpO2 100%   BMI 14.97 kg/m²    O2 Flow Rate (L/min): 4 l/min O2 Device: Nasal cannula    Temp (24hrs), Av.3 °F (37.4 °C), Min:98.7 °F (37.1 °C), Max:99.8 °F (37.7 °C)      No intake/output data recorded.  1901 -  0700  In: -   Out:      PHYSICAL EXAM:    Physical Exam  Constitutional:       General: She is in acute distress. Appearance: She is ill-appearing and toxic-appearing. Cardiovascular:      Rate and Rhythm: Tachycardia present. Pulmonary:      Effort: Respiratory distress present. Abdominal:      Comments: PEG tube   Neurological:      Mental Status: She is disoriented. Motor: Weakness present.           Data Review    Recent Results (from the past 24 hour(s))   ECHO ADULT COMPLETE    Collection Time: 21  2:12 PM   Result Value Ref Range    Pulmonic Regurgitant End Max Velocity 128.00 cm/s    AoV PG 7.00 mmHg    Aortic Valve Area by Continuity of Peak Velocity 2.22 cm2    IVSd 1.29 (A) 0.6 - 0.9 cm    LVIDd 3.83 (A) 3.9 - 5.3 cm    LVIDs 2.31 cm    LVOT d 1.80 cm    Pulmonic Regurgitant End Max Velocity 112.00 cm/s    LVOT Peak Gradient 5.00 mmHg    LVPWd 1.19 (A) 0.6 - 0.9 cm    LV E' Septal Velocity 5.65 cm/s    LV ED Vol A2C 56.20 cm3    BP EF 71.0 55 - 100 %    LV ES Vol A2C 12.30 cm3    E/E' septal 31.50     LV Ejection Fraction MOD 2C 40 %    Pulmonic Regurgitant End Max Velocity 629.00 cm/s    Mitral Valve Deceleration [place] 23,860.00 mm/s2    Mitral Valve Deceleration [place] 23,860.00 mm/s2    Mitral Valve E Wave Deceleration Time 127.00 ms    Mitral Valve Pressure Half-time 44.00 ms    MV A [place] 125.00 cm/s    MV E [place] 178.00 cm/s    MVA (PHT) 5.00 cm2    MV E/A 1.42     Pulmonic Regurgitant End Max Velocity 175.00 cm/s    Pulmonic Valve Systolic Peak Instantaneous Gradient 12.00 mmHg    Pulmonic Regurgitant End Max Velocity 84.50 cm/s    Pulmonic Valve Systolic Peak Instantaneous Gradient 3.00 mmHg    Est. RA Pressure 3.00 mmHg    RVIDd 3.49 cm    RVSP 43.00 mmHg    Tricuspid Valve Max Velocity 317.00 cm/s    Triscuspid Valve Regurgitation Peak Gradient 40.00 mmHg    LV Mass .9 67 - 162 g    LV Mass AL Index 115.6 43 - 95 g/m2    ARTUR/BSA Pk [place] 1.6 cm2/m2       XR CHEST PORT   Final Result   IMPRESSION: Bilateral pleural effusions, right greater than left. Bilateral   airspace disease, with differential diagnosis including pulmonary edema and/or   pneumonia. XR CHEST PORT   Final Result   FINDINGS: Impression: Frontal supine single view chest does not include the left   lower lateral lung. Right dialysis catheter distal tip right atrium. Cardiomegaly, vascular congestion. Right greater than left pulmonary airspace edema and/or pneumonia. Small to   moderate size right pleural effusion. No pneumothorax. No acute bony findings.           Active Problems:    Vascular dementia with depressed mood ([facility] [address] (9/6/2018)      CKD (chronic kidney disease) requiring chronic dialysis (HCC) ()      Overview: ESRD/ARF Dialysi Tues-Thurs- Sat      Hypertension () Hemiplegia following cerebrovascular accident (CVA) ([facility] [address] (6/17/2020)      Gastrostomy tube dependent ([facility] [address] (6/17/2020)      Anemia of chronic disease (6/17/2020)      Iron deficiency anemia (6/17/2020)      Dysphagia (6/23/2020)      Hypothermia (1/6/2021)      Hypotension (1/6/2021)      Sepsis ([facility] [address] (1/6/2021)        Assessment/Plan:     1. Sepsis   2. ESRD on HD  3. Respiratory distress with hypoxia  4. Tachypnea  5. COVID-19 test: Negative   6. CVA with residual: Wheelchair-bound  7. Dementia: With behavioral disturbance  8. Failure to thrive   9. Non-STEMI  10. Elevated troponin  11. History of severe MR  12. Acute anemia    Family has decided to make the patient comfort care only. Discontinuation of antibiotics, cardiac medications, supplemental oxygen and hemodialysis withdrawn. Comfort measures including pain management, anxiety management humidified air, and skin care. Discussed at length transitioning from inpatient comfort care to hospice. DVT Prophylaxis: Wheelchair-bound not needed  Code Status: DNR  POA/NOK:  ______________________________________________________________________________  Time spent in direct care including coordination of service, review of data and examination: > 35 minutes  Care Plan discussed with: RN, consults and daughters. Her family visits we can withdrawal all treatment care and focus on comfort care measures only including oxygen nutrition and pain management.    ______________________________________________________________________________    Cody Cancino NP    This is dictation was done by [employer], [employer] voice recognition software. Quite often unanticipated grammatical, syntax, homophones and other interpretive errors or inadvertently transcribed by the computer software. Please excuse errors that have escaped final proofreading. Thank you.

## 2021-01-08 NOTE — CONSULTS
NAME:  Kimberlee Allison   :      MRN:   [id number]     ATTENDING: Denisse Fortune MD  PCP:  Luis Armando Monae MD    Date/Time:  2021 10:22 PM      Subjective:   REQUESTING PHYSICIAN: Dr. Nick Whitehead:  ESRD on HD  Ms. Leona Taylor is a 50-year-old -American female with past medical history ofHypertension, end-stage renal disease on hemodialysis on  and  presented to the emergency room with complaints of shortness of breath. Patient has baseline history of dementia, she is wheelchair-bound, transferred to the emergency room as patient was found to have altered mental status and shortness of breath. Patient had pulmonary congestion and suspected infiltrates on initial chest x-ray and a high white cell count of 27K. Urgent hemodialysis was arranged and patient seen on hemodialysis. She was on nonrebreather mask, appears to have respiratory distress with tachypnea. Altered mental status, no meaningful conversations at this time. No swelling in her lower extremities.          Past Medical History:   Diagnosis Date    CHF (congestive heart failure) ([address] 10/2013    Chronic kidney disease     ESRD/ARF Dialysi Tues-Thurs- Sat    GERD (gastroesophageal reflux disease)     Hypertension     Insomnia     Severe mitral regurgitation 2013    Stroke [facility])     Urinary incontinence     Vascular dementia with depressed mood ([facility]       Past Surgical History:   Procedure Laterality Date    HX BACK SURGERY      3 herniated discs    HX  SECTION       Social History     Tobacco Use    Smoking status: Former Smoker     Types: Cigarettes     Quit date: 2013     Years since quittin.0    Smokeless tobacco: Never Used   Substance Use Topics    Alcohol use: No      Family History   Problem Relation Age of Onset    Diabetes Mother         Type 2    Heart Failure Father     Diabetes Sister         3 sisters    Diabetes Brother         3 brothers    Hypertension Daughter        No Known Allergies   Prior to Admission medications    Medication Sig Start Date End Date Taking? Authorizing Provider   amoxicillin-clavulanate (AUGMENTIN) 600-42.9 mg/5 mL suspension 7 mL by Per G Tube route two (2) times a day for 10 days. 1/4/21 1/14/21  Yenni Iglesias MD   metoprolol tartrate (LOPRESSOR) 50 mg tablet Take 1 tablet by mouth twice daily 12/7/20   Fidel Drake NP   atorvastatin (LIPITOR) 40 mg tablet Take 1 tablet by mouth once daily 12/7/20   Fidel Drake NP   clopidogreL (PLAVIX) 75 mg tab Take 1 tablet by mouth once daily 12/7/20   Rand PIEDRA NP   amiodarone (CORDARONE) 200 mg tablet Take 1 tablet by mouth twice daily 12/4/20   Kota Britton MD   traZODone (DESYREL) 50 mg tablet TAKE 1 TAB PER G TUBE AT BEDTIME 11/9/20   Yenni Iglesias MD   LORazepam (ATIVAN) 0.5 mg tablet Take 1 Tab by mouth every Monday, Wednesday, Friday. Max Daily Amount: 0.5 mg. Prior to dialysis. 11/9/20   Yenni Iglesias MD   ondansetron hcl (Zofran) 4 mg tablet Take 1 Tab by mouth every six (6) hours as needed for Nausea or Nausea or Vomiting. 7/23/20   Yenni Iglesias MD   OTHER,NON-FORMULARY, 1 hospital bed mattress. 7/6/20   Yenni Iglesias MD   OTHER,NON-FORMULARY, 1 tub transfer bed. 7/6/20   Yenni Iglesias MD   Bedside Commode XX 1 3-in-1 bedside commode. Fax to [employer] [phone] 7/6/20   Yenni Iglesias MD   Nut. Tx. Impaired Renal Fxn,Soy (Nepro Carb Steady) 0.08 gram-1.8 kcal/mL liqd 4 oz by Gastrostomy Tube route five (5) times daily. 6/23/20   Yenni Iglesias MD   nystatin (MYCOSTATIN) powder Apply  to affected area four (4) times daily. 6/17/20   Yenni Iglesias MD   lactobacillus-acidophilus (LACTINEX) 100 million cell grpk Take 1 Packet by mouth two (2) times a day.  5/12/20   Fidel Drake NP   cholecalciferol (VITAMIN D3) (50,000 UNITS /1250 MCG) capsule Take 1 capsule every Monday and Friday 5/7/20   Doneheidi Backibrahima PIEDRA, NP   ferrous sulfate 325 mg (65 mg iron) tablet Take 1 Tab by mouth two (2) times a day. 5/7/20   Ameya Montague, NP   pantoprazole (Protonix) 40 mg granules for oral suspension Take 40 mg by mouth daily. 5/7/20   Rico Knox NP   NUT. TX.IMPAIRED RENAL FXN,SOY (NEPRO PO) Take  by mouth. 1.5 CANS VIA FEEDING TUBE EVERY 4 HOURS PER DAUGHTER    Provider, Historical   aspirin 81 mg chewable tablet Take 81 mg by mouth daily.     Provider, Historical     Current Facility-Administered Medications   Medication Dose Route Frequency    ondansetron (ZOFRAN ODT) tablet 4 mg  4 mg Oral Q6H PRN    haloperidol (HALDOL) 2 mg/mL oral solution 2 mg  2 mg SubLINGual Q6H PRN    Or    haloperidol lactate (HALDOL) injection 2 mg  2 mg IntraVENous Q6H PRN    LORazepam (INTENSOL) 2 mg/mL oral concentrate 1 mg  1 mg Oral Q4H PRN    scopolamine (TRANSDERM-SCOP) 1 mg over 3 days 1 Patch  1 Patch TransDERmal Q72H PRN    morphine injection 2 mg  2 mg IntraVENous Q2H PRN    sodium chloride (NS) flush 5-10 mL  5-10 mL IntraVENous PRN    0.9% sodium chloride infusion 250 mL  250 mL IntraVENous PRN    sodium chloride (NS) flush 5-40 mL  5-40 mL IntraVENous PRN    acetaminophen (TYLENOL) tablet 650 mg  650 mg Oral Q6H PRN    Or    acetaminophen (TYLENOL) suppository 650 mg  650 mg Rectal Q6H PRN    polyethylene glycol (MIRALAX) packet 17 g  17 g Oral DAILY PRN    bisacodyL (DULCOLAX) tablet 5 mg  5 mg Oral DAILY PRN    ondansetron (ZOFRAN ODT) tablet 4 mg  4 mg Oral Q6H PRN    Or    ondansetron (ZOFRAN) injection 4 mg  4 mg IntraVENous Q6H PRN    0.9% sodium chloride infusion  125 mL/hr IntraVENous CONTINUOUS       REVIEW OF SYSTEMS:     Unable to review as patient has altered mental status and poor historian    Objective:   VITALS:    Visit Vitals  /80 (BP Patient Position: At rest)   Pulse (!) 120   Temp 99.8 °F (37.7 °C)   Resp 25   Ht 5' 5\" (1.651 m)   Wt 40.8 kg (89 lb 15.2 oz)   SpO2 100%   BMI 14.97 kg/m²     Temp (24hrs), Av.4 °F (37.4 °C), Min:98.9 °F (37.2 °C), Max:99.8 °F (37.7 °C)      PHYSICAL EXAM:   General: Altered mental status, on nonrebreather mask, tachypnea present   HEENT: EOMI, no Icterus, no Pallor, pupils reactive, mucosa moist, normal inspection of ears and nose  Neck: Neck is supple, JVD+, no thyromegaly,  Lungs: Decreased breath sounds at the bases , scattered Rales heard   CVS: heart sounds normal, no murmurs, no rubs.   GI: soft, nontender, normal BS, no palpable organomegaly, Extremeties:  no cyanosis, no edema,  Neuro: Patient is drowsy but arousable , altered mental status , no meaningful verbal interactions   skin: normal skin turgor, no skin rashes  Musculoskeletal: no acute joint swellings,    LAB DATA REVIEWED:    Recent Results (from the past 24 hour(s))   METABOLIC PANEL, BASIC    Collection Time: 21  4:05 AM   Result Value Ref Range    Sodium 134 (L) 136 - 145 mmol/L    Potassium 4.5 3.5 - 5.1 mmol/L    Chloride 100 97 - 108 mmol/L    CO2 24 21 - 32 mmol/L    Anion gap 10 5 - 15 mmol/L    Glucose 104 (H) 65 - 100 mg/dL    BUN 56 (H) 6 - 20 mg/dL    Creatinine 4.39 (H) 0.55 - 1.02 mg/dL    BUN/Creatinine ratio 13 12 - 20      GFR est AA 12 (L) >60 ml/min/1.73m2    GFR est non-AA 10 (L) >60 ml/min/1.73m2    Calcium 8.2 (L) 8.5 - 10.1 mg/dL   CBC W/O DIFF    Collection Time: 21  4:05 AM   Result Value Ref Range    WBC 27.5 (H) 3.6 - 11.0 K/uL    RBC 3.39 (L) 3.80 - 5.20 M/uL    HGB 8.4 (L) 11.5 - 16.0 g/dL    HCT 26.5 (L) 35.0 - 47.0 %    MCV 78.2 (L) 80.0 - 99.0 FL    MCH 24.8 (L) 26.0 - 34.0 PG    MCHC 31.7 30.0 - 36.5 g/dL    RDW 21.5 (H) 11.5 - 14.5 %    PLATELET 70 (L) 936 - 400 K/uL   CBC WITH AUTOMATED DIFF    Collection Time: 21  4:05 AM   Result Value Ref Range    WBC 27.8 (H) 3.6 - 11.0 K/uL    RBC 3.20 (L) 3.80 - 5.20 M/uL    HGB 8.6 (L) 11.5 - 16.0 g/dL    HCT 25.1 (L) 35.0 - 47.0 %    MCV 78.4 (L) 80.0 - 99.0 FL MCH 26.9 26.0 - 34.0 PG    MCHC 34.3 30.0 - 36.5 g/dL    RDW 21.6 (H) 11.5 - 14.5 %    PLATELET 70 (L) 246 - 400 K/uL    NEUTROPHILS 93 (H) 32 - 75 %    LYMPHOCYTES 2 (L) 12 - 49 %    MONOCYTES 4 (L) 5 - 13 %    EOSINOPHILS 0 0 - 7 %    BASOPHILS 0 0 - 1 %    IMMATURE GRANULOCYTES 1 (H) 0.0 - 0.5 %    ABS. NEUTROPHILS 26.1 (H) 1.8 - 8.0 K/UL    ABS. LYMPHOCYTES 0.7 (L) 0.8 - 3.5 K/UL    ABS. MONOCYTES 1.0 0.0 - 1.0 K/UL    ABS. EOSINOPHILS 0.0 0.0 - 0.4 K/UL    ABS. BASOPHILS 0.0 0.0 - 0.1 K/UL    ABS. IMM.  GRANS. 0.1 (H) 0.00 - 0.04 K/UL    DF AUTOMATED     C REACTIVE PROTEIN, QT    Collection Time: 01/07/21  4:05 AM   Result Value Ref Range    C-Reactive protein 14.70 (H) 0.00 - 0.60 mg/dL   PROCALCITONIN    Collection Time: 01/07/21  4:05 AM   Result Value Ref Range    Procalcitonin 17.00 (H) 0 ng/mL   VANCOMYCIN, RANDOM    Collection Time: 01/07/21 10:45 AM   Result Value Ref Range    Vancomycin, random 28.2 ug/mL    Reported dose date Not provided      Reported dose: Not provided Units   TROPONIN I    Collection Time: 01/07/21 12:44 PM   Result Value Ref Range    Troponin-I, Qt. 1.67 (H) <0.05 ng/mL   ECHO ADULT COMPLETE    Collection Time: 01/07/21  2:12 PM   Result Value Ref Range    Pulmonic Regurgitant End Max Velocity 128.00 cm/s    AoV PG 7.00 mmHg    Aortic Valve Area by Continuity of Peak Velocity 2.22 cm2    IVSd 1.29 (A) 0.6 - 0.9 cm    LVIDd 3.83 (A) 3.9 - 5.3 cm    LVIDs 2.31 cm    LVOT d 1.80 cm    Pulmonic Regurgitant End Max Velocity 112.00 cm/s    LVOT Peak Gradient 5.00 mmHg    LVPWd 1.19 (A) 0.6 - 0.9 cm    LV E' Septal Velocity 5.65 cm/s    LV ED Vol A2C 56.20 cm3    BP EF 71.0 55 - 100 %    LV ES Vol A2C 12.30 cm3    E/E' septal 31.50     LV Ejection Fraction MOD 2C 40 %    Pulmonic Regurgitant End Max Velocity 629.00 cm/s    Mitral Valve Deceleration [place] 23,860.00 mm/s2    Mitral Valve Deceleration [place] 23,860.00 mm/s2    Mitral Valve E Wave Deceleration Time 127.00 ms    Mitral Valve Pressure Half-time 44.00 ms    MV A Warren 125.00 cm/s    MV E [place] 178.00 cm/s    MVA (PHT) 5.00 cm2    MV E/A 1.42     Pulmonic Regurgitant End Max Velocity 175.00 cm/s    Pulmonic Valve Systolic Peak Instantaneous Gradient 12.00 mmHg    Pulmonic Regurgitant End Max Velocity 84.50 cm/s    Pulmonic Valve Systolic Peak Instantaneous Gradient 3.00 mmHg    Est. RA Pressure 3.00 mmHg    RVIDd 3.49 cm    RVSP 43.00 mmHg    Tricuspid Valve Max Velocity 317.00 cm/s    Triscuspid Valve Regurgitation Peak Gradient 40.00 mmHg    LV Mass .9 67 - 162 g    LV Mass AL Index 115.6 43 - 95 g/m2    ARTUR/BSA Pk [place] 1.6 cm2/m2       Assessment and plan    1. End-stage renal disease: On hemodialysis, on TTS at [facility]  Admitted with SOB/pulm edema   inpatient hemodialysis arranged for today and seen on hemodialysis  Hemodynamically stable, tolerating ultrafiltration, will try 2 L of ultrafiltration today  We will continue maintenance hemodialysis on TTS    2. Shortness of breath: Probably related to pneumonia /fluid overload: High white cell count, infiltrates noted on chest x-ray  Rapid COVID-19 test was negative  Continue antibiotics as per ID recommendations  Will try 2 L ultrafiltration today, no evidence of any peripheral edema. 3.  Altered mental status /baseline dementia present : Altered mental status is probably related to sepsis/pneumonia/hypoxia, continue IV antibiotics continue to monitor clinically     4. Hypertension: Initially hypotensive but stable blood pressures now  We will monitor blood pressures and adjust antihypertensive regimen as needed    5. Anemia: Secondary to end-stage renal disease  Continue Epogen with hemodialysis  We will check iron studies and supplement iron as needed    6.  Renal osteodystrophy:   Calcium level is normal  We will check phosphorus level to assess for hyperphosphatemia,   Secondary hyperparathyroidism: will Continue outpatient medications          Signed: [facility], MD

## 2021-01-08 NOTE — PROGRESS NOTES
Nutrition Note  Nutrition screened for low BMI, however as pt transitioning to [facility]/Hospice today per NP Shashank Novak, nutrition will not begin to follow this patient. Consider allowing diet for comfort as appropriate. Please consult dietitian as appropriate if nutrition interventions within goals of care.     Electronically signed by Queen Kolby NAGY on 1/8/2021 at 7:59 AM    Contact: Ext [phone]

## 2021-01-08 NOTE — PROGRESS NOTES
Bedside shift change report given to BLAINE Arzate RN (oncoming nurse) by SCOTTIE Sanchez RN (offgoing nurse). Report included the following information SBAR, Kardex, Intake/Output, MAR and Recent Results, family contact.

## 2021-01-08 NOTE — PROGRESS NOTES
Problem: Pressure Injury - Risk of  Goal: *Prevention of pressure injury  Description: Document Zheng Scale and appropriate interventions in the flowsheet. Outcome: Progressing Towards Goal  Note: Pressure Injury Interventions:  Sensory Interventions: Assess changes in LOC, Assess need for specialty bed, Avoid rigorous massage over bony prominences, Check visual cues for pain, Discuss PT/OT consult with provider, Float heels, Keep linens dry and wrinkle-free, Maintain/enhance activity level, Minimize linen layers, Monitor skin under medical devices    Moisture Interventions: Absorbent underpads, Apply protective barrier, creams and emollients, Check for incontinence Q2 hours and as needed, Internal/External urinary devices, Limit adult briefs, Maintain skin hydration (lotion/cream), Minimize layers, Moisture barrier, Offer toileting Q_hr    Activity Interventions: Pressure redistribution bed/mattress(bed type)    Mobility Interventions: HOB 30 degrees or less, Turn and reposition approx.  every two hours(pillow and wedges), Pressure redistribution bed/mattress (bed type), Float heels    Nutrition Interventions: Document food/fluid/supplement intake    Friction and Shear Interventions: Feet elevated on foot rest, Apply protective barrier, creams and emollients, Lift team/patient mobility team, Minimize layers                Problem: Patient Education: Go to Patient Education Activity  Goal: Patient/Family Education  Outcome: Progressing Towards Goal

## 2021-01-08 NOTE — PROGRESS NOTES
CM met with patient's daughter, Rosendo Rodriguez, and two other family members in patient's room. Patient currently lives in the same home with her and has a caregiver, Lori Vogel, that is available to care for her whenever Ms. Albino Mace is not home. CM discussed possible hospice care, stated she would want home hospice however not able to make that decision at this time, Ms. Albino Mace provided with home hospice choice list.      CM to follow up at a later time.

## 2021-01-08 NOTE — PROGRESS NOTES
Spiritual Care Assessment/Progress Note  [facility]      NAME: Rafiq Bob      MRN: [id number]  AGE: 76 y.o. SEX: female  Sabianism Affiliation: Brendan Tang   Language: English     1/7/2021     Total Time (in minutes): 139     Spiritual Assessment begun in [address] through conversation with:         []Patient        [x] Family    [] Friend(s)        Reason for Consult: End-of-life support     Spiritual beliefs: (Please include comment if needed)     [x] Identifies with a ave tradition:         [] Supported by a ave community:            [] Claims no spiritual orientation:           [] Seeking spiritual identity:                [] Adheres to an individual form of spirituality:           [] Not able to assess:                           Identified resources for coping:      [x] Prayer                               [] Music                  [] Guided Imagery     [x] Family/friends                 [] Pet visits     [] Devotional reading                         [] Unknown     [] Other:                                             Interventions offered during this visit: (See comments for more details)    Patient Interventions: Prayer (actual)     Family/Friend(s):  Affirmation of emotions/emotional suffering, Affirmation of ave, Bridging, Catharsis/review of pertinent events in supportive environment, Coping skills reviewed/reinforced, End of life issues discussed, Initial Assessment, Life review/legacy, Normalization of emotional/spiritual concerns, Reframing, Sabianism beliefs/image of God discussed     Plan of Care:     [] Support spiritual and/or cultural needs    [] Support AMD and/or advance care planning process      [] Support grieving process   [] Coordinate Rites and/or Rituals    [] Coordination with community clergy   [] No spiritual needs identified at this time   [] Detailed Plan of Care below (See Comments)  [] Make referral to Music Therapy  [] Make referral to Pet Therapy     [] Make referral to Addiction services  [] Make referral to [facility]  [] Make referral to Spiritual Care Partner  [] No future visits requested        [x] Follow up upon further referrals     Comments:  Attempted to visit the patient in [facility] cardiac telemetry unit for end of life support. Patient and her family members were present during the visit. Patient seemed awake but was non-engaging. Family members shared history of her life and recent medical conditions. Some  shared stories of other family members and various Adventism experiences surrounding them. They seemed to process their emotions verbally and tearfully at times. I waited for all the family members to arrive and assisted them to the patient's room. I listened empathically and reflectively, normalizing their difficulty of patient's possible death. I facilitated storytelling and offered to pray which they accepted. I collaborated with registration staff and medical staff to provide care for the family members. I advised of  availability. Chaplains will follow up if further referrals are requested. Chaplain Joby Andrews M.Div.    can be reached by calling the  at [facility]  [phone]

## 2021-01-08 NOTE — PROGRESS NOTES
Progress Note    Patient: Rafiq Bob MRN: [id number]  SSN: [id number]    YOB: 1955  Age: 76 y.o. Sex: female      Admit Date: 1/6/2021    LOS: 2 days     Subjective:   No acute events overnight. She is obtunded. Daughter at bedside. Objective:     Vitals:    01/08/21 0000 01/08/21 0400 01/08/21 0737 01/08/21 0800   BP:   114/71    Pulse: (!) 105 (!) 106 (!) 104 (!) 104   Resp:   18    Temp:   99.1 °F (37.3 °C)    TempSrc:       SpO2:   100%    Weight:       Height:            Intake and Output:  Current Shift: No intake/output data recorded. Last three shifts: 01/06 1901 - 01/08 0700  In: -   Out: 2000     Physical Exam:   General:   She appears to be comfortable   Eyes:  Conjunctivae/corneas clear. PERRL, EOMs intact. Fundi benign   Ears:  Normal TMs and external ear canals both ears. Nose: Nares normal. Septum midline. Mucosa normal. No drainage or sinus tenderness. Mouth/Throat: Lips, mucosa, and tongue normal. Teeth and gums normal.   Neck: Supple, symmetrical, trachea midline, no adenopathy, thyroid: no enlargment/tenderness/nodules, no carotid bruit and no JVD. Back:   Symmetric, no curvature. ROM normal. No CVA tenderness. Lungs:   Clear to auscultation bilaterally. Heart:  Regular rate and rhythm, S1, S2 normal, no murmur, click, rub or gallop. Abdomen:   Soft, non-tender. Bowel sounds normal. No masses,  No organomegaly. Extremities: Extremities normal, atraumatic, no cyanosis or edema. Pulses: 2+ and symmetric all extremities. Skin: Skin color, texture, turgor normal. No rashes or lesions   Lymph nodes: Cervical, supraclavicular, and axillary nodes normal.   Neurologic:  She is obtunded. Lab/Data Review: All lab results for the last 24 hours reviewed.        Assessment:     Active Problems:    Vascular dementia with depressed mood ([facility] (9/6/2018)      CKD (chronic kidney disease) requiring chronic dialysis ([facility] ()      Overview: ESRD/ARF Dialysi Tues-urs- Sat      Hypertension ()      Hemiplegia following cerebrovascular accident (CVA) ([facility] (6/17/2020)      Gastrostomy tube dependent ([facility] (6/17/2020)      Anemia of chronic disease (6/17/2020)      Iron deficiency anemia (6/17/2020)      Dysphagia (6/23/2020)      Hypothermia (1/6/2021)      Hypotension (1/6/2021)      Sepsis ([facility] (1/6/2021)    Patient is a 60-year-old -American female with:  1. Mitral valve endocarditis  2. Anemia  3. Thrombocytopenia  4. End-stage renal disease on hemodialysis  5. Non-STEMI  6. Sepsis  7. CVA with residual weakness, wheelchair-bound  8. Dementia  9. Failure to thrive status post PEG tube  10. DNR  11. Mitral regurgitation    Plan:     Patient with mitral valve endocarditis severe mitral regurgitation, severe tricuspid regurgitation. Pleural effusion and pericardial effusion. Ejection fraction appears to be normal.  She appears to be comfortable. I had a lengthy discussion with the patient daughter at bedside. Patient is DNR. She has a history of advanced dementia, she is bedridden. At this point family are considering comfort care. No further cardiac testing planned at this time. I will sign off and remain available if needed.     Signed By: Isrrael Bustamante MD     January 8, 2021

## 2021-01-08 NOTE — PROGRESS NOTES
Brief follow up visit for patient in [facility] cardiac telemetry unit who is in comfort care. Patient, her son and daughter-in-law were present during the visit. Son stated he is doing okay and does not need anything at the moment. Both remembered this  from the previous night and seemed grateful for the follow up visit. I advised of  availability. Chaplains will follow up if further referrals are requested. Chaplain Faby Moreno M.Div.    can be reached by calling the  at [facility]  [phone]

## 2021-01-09 NOTE — PROGRESS NOTES
Infectious Disease Progress Note           Subjective:   Decreased responsiveness. Grand daughter at bedside. No acute events since last. 2 D echo revealed a large mitral valve veg. Off Ventimask   Objective:   Physical Exam:     Visit Vitals  /65   Pulse (!) 123   Temp 99.1 °F (37.3 °C)   Resp 18   Ht 5' 5\" (1.651 m)   Wt 89 lb 15.2 oz (40.8 kg)   SpO2 96%   BMI 14.97 kg/m²    O2 Flow Rate (L/min): 1 l/min O2 Device: Nasal cannula    Temp (24hrs), Av.9 °F (37.2 °C), Min:98.7 °F (37.1 °C), Max:99.1 °F (37.3 °C)    No intake/output data recorded. 701 -  1900  In: -   Out:      General: NAD, unresponsive   HEENT: GINA, Moist mucosa  Lungs: CTA b/l, decreased at the bases, no wheeze/rhonchi  Heart: S1S2+, RRR, no murmur  Abdo: Soft, NT, ND, +BS, + peg tube   Exts: No edema, + pulses b/l   Skin: No wounds, No rashes or lesions      Data Review:       Recent Days:  Recent Labs     21  0405 21  1045   WBC 27.5*  27.8* 25.1*   HGB 8.4*  8.6* 6.2*   HCT 26.5*  25.1* 19.7*   PLT 70*  70* 56*     Recent Labs     21  0405 21  1045   BUN 56* 44*   CREA 4.39* 3.91*       Lab Results   Component Value Date/Time    C-Reactive protein 14.70 (H) 2021 04:05 AM      Microbiology   - Blood Cx : Pending   - BloodCx : E. Faecalis       Diagnostics   CXR Results  (Last 48 hours)               21 1836  XR CHEST PORT Final result    Impression:  IMPRESSION: Bilateral pleural effusions, right greater than left. Bilateral   airspace disease, with differential diagnosis including pulmonary edema and/or   pneumonia. Narrative:  Portable chest, 1829 hours. Comparison with 2021. Stable appearance of right central dialysis catheter, with tip overlying the   lower SVC. The patient is rotated leftward, similar to previous. Bilateral   pleural effusions, right larger than left, more conspicuous than previous.    Stable appearance of cardiopericardial enlargement. Central vascular congestion   with ill-defined pulmonary vessels. Bilateral perihilar and basilar airspace   opacities, consistent with pulmonary edema and/or pneumonia. No pneumothorax. Assessment/Plan     1. Infective endocarditis w a large mitral valve veg and severe regurgitation on TTE      E. Faecalis isolated from [facility] 12/2020, repeat BCx from 01/06 is pending       Afebrile, persistent leukocytosis on routine labs       Optimal therapy will require valve replacement surgery but pt doesn't appear to be a good surgical candidate      Continue on vanc and Zosyn pending final BCx       CBC, CRP and ESR w morning labs     2. Suspected aspiration PNA w b/l infiltrates on CXR, R>L      H/o dysphagia, s/p peg tube placement       Continue on current antimicrobials as above         3. Acute on chronic anemia: Improved hgb after PRBC transfusion      4. Dental caries: Multiple decaying teeth, was on amoxicillin as out pt       5.  ESRD on HD, oliguric, + campuzano cath inserted in ED, minimal urine in bag and tubing     Vanna Bennett MD    1/8/2021

## 2021-01-09 NOTE — PROGRESS NOTES
Infectious Disease Progress Note           Subjective:   Pt seen and examined, very lethargic and not following commands. No acute events since last seen   Objective:   Physical Exam:     Visit Vitals  /68   Pulse (!) 115   Temp 99.5 °F (37.5 °C) (Oral)   Resp 14   Ht 5' 5\" (1.651 m)   Wt 97 lb 14.2 oz (44.4 kg)   SpO2 93%   BMI 16.29 kg/m²    O2 Flow Rate (L/min): 1 l/min O2 Device: Nasal cannula    Temp (24hrs), Av.3 °F (37.4 °C), Min:99 °F (37.2 °C), Max:99.5 °F (37.5 °C)     07 -  1900  In: -   Out: 400    No intake/output data recorded. General: NAD, unresponsive,   HEENT: GINA, Moist mucosa  Lungs: CTA b/l, decreased at the bases, no wheeze/rhonchi  Heart: S1S2+, RRR, no murmur  Abdo: Soft, NT, ND, +BS, + peg tube   Exts: No edema, + pulses b/l   Skin: No wounds, No rashes or lesions      Data Review:       Recent Days:  Recent Labs     21  0405   WBC 27.5*  27.8*   HGB 8.4*  8.6*   HCT 26.5*  25.1*   PLT 70*  70*     Recent Labs     21  0405   BUN 56*   CREA 4.39*       Lab Results   Component Value Date/Time    C-Reactive protein 14.70 (H) 2021 04:05 AM      Microbiology   - Blood Cx : GPC in chains and clusters   - BloodCx : E. Faecalis       Diagnostics   CXR Results  (Last 48 hours)               21 1836  XR CHEST PORT Final result    Impression:  IMPRESSION: Bilateral pleural effusions, right greater than left. Bilateral   airspace disease, with differential diagnosis including pulmonary edema and/or   pneumonia. Narrative:  Portable chest, 1829 hours. Comparison with 2021. Stable appearance of right central dialysis catheter, with tip overlying the   lower SVC. The patient is rotated leftward, similar to previous. Bilateral   pleural effusions, right larger than left, more conspicuous than previous. Stable appearance of cardiopericardial enlargement.  Central vascular congestion   with ill-defined pulmonary vessels. Bilateral perihilar and basilar airspace   opacities, consistent with pulmonary edema and/or pneumonia. No pneumothorax. Assessment/Plan     1. Infective endocarditis w a large mitral valve veg and severe regurgitation on TTE      E. Faecalis isolated from [facility] 12/2020, and GPC in clusters and chains on repeat       Will require valve replacement surgery for optimal treatment       Fmly agreeable w comfort measures, antibiotics discontinued     2. Suspected aspiration PNA w b/l infiltrates on CXR, R>L      H/o dysphagia, s/p peg tube placement. Remains at high risk for recurrent aspiration        3. Acute on chronic anemia: Improved hgb after PRBC transfusion      4. Dental caries: Multiple decaying teeth, will need complete teeth abstraction       5.  ESRD on HD, oliguric, + campuzano cath inserted in ED, minimal out put     [facility] reportably agreeable w comfort measures antibiotics discontinued, will sign off     Nora Ayala MD    1/9/2021

## 2021-01-09 NOTE — PROGRESS NOTES
Hospitalist Progress Note    Subjective:   Daily Progress Note: 1/9/2021 10:36 AM    Hospital Course:     Umu Ventura is a 76 y.o. female who Admitted on [phone]. PMH never came for chronic renal failure, arthritis, DM, ESRD on HD, GERD, gout, hypertension, iron deficiency anemia, dementia, wheelchair-bound. Family called EMS after she became lethargic nonresponsive at home. Patiently seen outpatient for dental infection and placed on amoxicillin 2 days ago. EMS found her hypotensive systolic blood pressure in the 70s and tachycardic. In the ED she is hypothermic at 90.5, tachycardic 124, breathing 20-40 times, BP 80/57. Chest x-ray cardiomegaly, vascular congestion are >L airspace edema and/or pneumonia, Small to moderate size right pleural effusion. Significant lab values hemoglobin 6.2, platelets 56, lactic acid 3.1, procalcitonin 20.24, troponin 1.97. Will admit for further management of presumed sepsis, hypotension, hypothermia, tachycardia, tachypneic and non-STEMI. Will start on IV vancomycin and cefepime empirically. Consult ID. Will transfuse 1 unit PRBC. Cardiology consulted. 2D echo pending. Received 1 unit PRBC for hemoglobin 6.2, diuresed gently after blood products. Cardiogram revealing large thrombus on mitral valve. Poor prognosis not a surgical candidate. Family has decided on comfort care. All progressive care measures discontinued including hemodialysis, antibiotics and cardiac medications. Comfort measures including pain and anxiety medications humidified oxygen and skin care initiated. Will discuss hospice and provide information to family. Subjective: Follow-up examination of patient at the bedside. Family at the bedside. Unresponsive with agonal breathing.   Current Facility-Administered Medications   Medication Dose Route Frequency    LORazepam (INTENSOL) 2 mg/mL oral concentrate 1 mg  1 mg Oral Q2H PRN    morphine injection 2 mg  2 mg IntraVENous Q1H PRN    ondansetron (ZOFRAN ODT) tablet 4 mg  4 mg Oral Q6H PRN    haloperidol (HALDOL) 2 mg/mL oral solution 2 mg  2 mg SubLINGual Q6H PRN    Or    haloperidol lactate (HALDOL) injection 2 mg  2 mg IntraVENous Q6H PRN    scopolamine (TRANSDERM-SCOP) 1 mg over 3 days 1 Patch  1 Patch TransDERmal Q72H PRN    sodium chloride (NS) flush 5-10 mL  5-10 mL IntraVENous PRN    sodium chloride (NS) flush 5-40 mL  5-40 mL IntraVENous PRN    acetaminophen (TYLENOL) tablet 650 mg  650 mg Oral Q6H PRN    Or    acetaminophen (TYLENOL) suppository 650 mg  650 mg Rectal Q6H PRN    polyethylene glycol (MIRALAX) packet 17 g  17 g Oral DAILY PRN    bisacodyL (DULCOLAX) tablet 5 mg  5 mg Oral DAILY PRN    ondansetron (ZOFRAN ODT) tablet 4 mg  4 mg Oral Q6H PRN    Or    ondansetron (ZOFRAN) injection 4 mg  4 mg IntraVENous Q6H PRN        REVIEW OF SYSTEMS    Review of Systems   Unable to perform ROS: Dementia        Objective:     Visit Vitals  /68   Pulse (!) 115   Temp 99.5 °F (37.5 °C) (Oral)   Resp 14   Ht 5' 5\" (1.651 m)   Wt 44.4 kg (97 lb 14.2 oz)   SpO2 92%   BMI 16.29 kg/m²    O2 Flow Rate (L/min): 1 l/min O2 Device: Nasal cannula    Temp (24hrs), Av.3 °F (37.4 °C), Min:99 °F (37.2 °C), Max:99.5 °F (37.5 °C)       0701 -  1900  In: -   Out: 400   No intake/output data recorded. PHYSICAL EXAM:    Physical Exam  Constitutional:       General: She is in acute distress. Appearance: She is ill-appearing and toxic-appearing. Cardiovascular:      Rate and Rhythm: Tachycardia present. Pulmonary:      Effort: Respiratory distress present. Abdominal:      Comments: PEG tube   Neurological:      Mental Status: She is disoriented. Motor: Weakness present. Data Review    No results found for this or any previous visit (from the past 24 hour(s)). XR CHEST PORT   Final Result   IMPRESSION: Bilateral pleural effusions, right greater than left.  Bilateral   airspace disease, with differential diagnosis including pulmonary edema and/or   pneumonia. XR CHEST PORT   Final Result   FINDINGS: Impression: Frontal supine single view chest does not include the left   lower lateral lung. Right dialysis catheter distal tip right atrium. Cardiomegaly, vascular congestion. Right greater than left pulmonary airspace edema and/or pneumonia. Small to   moderate size right pleural effusion. No pneumothorax. No acute bony findings. Active Problems:    Vascular dementia with depressed mood ([address] (9/6/2018)      CKD (chronic kidney disease) requiring chronic dialysis (HCC) ()      Overview: ESRD/ARF Dialysi [facility]- Sat      Hypertension ()      Hemiplegia following cerebrovascular accident (CVA) ([facility] (6/17/2020)      Gastrostomy tube dependent ([address] (6/17/2020)      Anemia of chronic disease (6/17/2020)      Iron deficiency anemia (6/17/2020)      Dysphagia (6/23/2020)      Hypothermia (1/6/2021)      Hypotension (1/6/2021)      Sepsis ([facility] [address] (1/6/2021)        Assessment/Plan:     1. Sepsis   2. ESRD on HD  3. Respiratory distress with hypoxia  4. Tachypnea  5. COVID-19 test: Negative   6. CVA with residual: Wheelchair-bound  7. Dementia: With behavioral disturbance  8. Failure to thrive   9. Non-STEMI  10. Elevated troponin  11. History of severe MR  12. Acute anemia    Family has decided to make the patient comfort care only. Discontinuation of antibiotics, cardiac medications, supplemental oxygen and hemodialysis withdrawn. Comfort measures including pain management, anxiety management humidified air, and skin care. Discussed at length transitioning from inpatient comfort care to hospice. Currently the patient is unresponsive with agonal breathing.     DVT Prophylaxis: Wheelchair-bound not needed  Code Status: DNR  POA/NOK:  ______________________________________________________________________________  Time spent in direct care including coordination of service, review of data and examination: > 35 minutes  Care Plan discussed with: RN, consults and daughters. Her family visits we can withdrawal all treatment care and focus on comfort care measures only including oxygen nutrition and pain management.    ______________________________________________________________________________    Marquis Mary NP    This is dictation was done by dragon, computer voice recognition software. Quite often unanticipated grammatical, syntax, homophones and other interpretive errors or inadvertently transcribed by the computer software. Please excuse errors that have escaped final proofreading. Thank you.

## 2021-01-09 NOTE — PROGRESS NOTES
Renal Progress Note    Patient: Vivian Dasilva MRN: [id number]  SSN: [id number]    YOB: 1955  Age: 76 y.o. Sex: female      Admit Date: 1/6/2021    LOS: 2 days     Subjective:   Patient seen at bedside. Lethargic  No acute distress    Current Facility-Administered Medications   Medication Dose Route Frequency    ondansetron (ZOFRAN ODT) tablet 4 mg  4 mg Oral Q6H PRN    haloperidol (HALDOL) 2 mg/mL oral solution 2 mg  2 mg SubLINGual Q6H PRN    Or    haloperidol lactate (HALDOL) injection 2 mg  2 mg IntraVENous Q6H PRN    LORazepam (INTENSOL) 2 mg/mL oral concentrate 1 mg  1 mg Oral Q4H PRN    scopolamine (TRANSDERM-SCOP) 1 mg over 3 days 1 Patch  1 Patch TransDERmal Q72H PRN    morphine injection 2 mg  2 mg IntraVENous Q2H PRN    sodium chloride (NS) flush 5-10 mL  5-10 mL IntraVENous PRN    sodium chloride (NS) flush 5-40 mL  5-40 mL IntraVENous PRN    acetaminophen (TYLENOL) tablet 650 mg  650 mg Oral Q6H PRN    Or    acetaminophen (TYLENOL) suppository 650 mg  650 mg Rectal Q6H PRN    polyethylene glycol (MIRALAX) packet 17 g  17 g Oral DAILY PRN    bisacodyL (DULCOLAX) tablet 5 mg  5 mg Oral DAILY PRN    ondansetron (ZOFRAN ODT) tablet 4 mg  4 mg Oral Q6H PRN    Or    ondansetron (ZOFRAN) injection 4 mg  4 mg IntraVENous Q6H PRN        Vitals:    01/08/21 0800 01/08/21 1200 01/08/21 1600 01/08/21 1721   BP:    119/65   Pulse: (!) 104 (!) 104 (!) 123 (!) 123   Resp:    18   Temp:       TempSrc:       SpO2: 96%      Weight:       Height:         Objective:   General: Altered mental status,   HEENT: EOMI, no Icterus, no Pallor, pupils reactive,   Neck: Neck is supple, JVD+,   Lungs: Decreased breath sounds at the bases ,  CVS: heart sounds normal, no murmurs, no rubs. GI: soft, nontender, normal BS,   Extremeties:  no cyanosis, no edema,  Neuro: Patient is lethargic  skin: normal skin turgor, no skin rashes    Intake and Output:  Current Shift: No intake/output data recorded.   Last three shifts: 01/07 0701 - 01/08 1900  In: -   Out: 2000       Lab/Data Review:  Recent Labs     01/07/21  0405 01/06/21  1045   WBC 27.5*  27.8* 25.1*   HGB 8.4*  8.6* 6.2*   HCT 26.5*  25.1* 19.7*   PLT 70*  70* 56*     Recent Labs     01/07/21  0405 01/06/21  1045   * 133*   K 4.5 3.9    96*   CO2 24 26   * 137*   BUN 56* 44*   CREA 4.39* 3.91*   CA 8.2* 8.1*   MG  --  2.5*   ALB  --  1.8*   TBILI  --  0.8   ALT  --  66   INR  --  1.5*     No results for input(s): PH, PCO2, PO2, HCO3, FIO2 in the last 72 hours. No results found for this or any previous visit (from the past 24 hour(s)). Assessment and Plan:     1. End-stage renal disease: On hemodialysis, on TTS at [facility]  Admitted with SOB/pulm edema   inpatient hemodialysis arranged yesterday, SOB better post HD   Hemodynamically stable,  Family decided on comfort measures for her.  Will discontinue HD  Will sign off              Signed By: Nitin Guzman MD     January 8, 2021

## 2021-01-09 NOTE — ROUTINE PROCESS
Verbal bedside shift report provided to [employer], RN. Report included SBAR, labs and assessment data, plan of care for the day, and opportunities to ask questions.

## 2021-01-10 NOTE — PROGRESS NOTES
Hospitalist Progress Note    Subjective:   Daily Progress Note: 1/10/2021 10:36 AM    Hospital Course:     Maryjo Oliveros is a 76 y.o. female who Admitted on [phone]. PMH never came for chronic renal failure, arthritis, DM, ESRD on HD, GERD, gout, hypertension, iron deficiency anemia, dementia, wheelchair-bound. Family called EMS after she became lethargic nonresponsive at home. Patiently seen outpatient for dental infection and placed on amoxicillin 2 days ago. EMS found her hypotensive systolic blood pressure in the 70s and tachycardic. In the ED she is hypothermic at 90.5, tachycardic 124, breathing 20-40 times, BP 80/57. Chest x-ray cardiomegaly, vascular congestion are >L airspace edema and/or pneumonia, Small to moderate size right pleural effusion. Significant lab values hemoglobin 6.2, platelets 56, lactic acid 3.1, procalcitonin 20.24, troponin 1.97. Will admit for further management of presumed sepsis, hypotension, hypothermia, tachycardia, tachypneic and non-STEMI. Will start on IV vancomycin and cefepime empirically. Consult ID. Will transfuse 1 unit PRBC. Cardiology consulted. 2D echo pending. Received 1 unit PRBC for hemoglobin 6.2, diuresed gently after blood products. Cardiogram revealing large thrombus on mitral valve. Poor prognosis not a surgical candidate. Family has decided on comfort care. All progressive care measures discontinued including hemodialysis, antibiotics and cardiac medications. Comfort measures including pain and anxiety medications humidified oxygen and skin care initiated. Will discuss hospice and provide information to family. Subjective: Follow-up examination of patient at the bedside. Family at the bedside. Unresponsive with agonal breathing.  called for spiritual support.     Current Facility-Administered Medications   Medication Dose Route Frequency    LORazepam (INTENSOL) 2 mg/mL oral concentrate 1 mg  1 mg Oral Q2H PRN    morphine injection 2 mg  2 mg IntraVENous Q1H PRN    ondansetron (ZOFRAN ODT) tablet 4 mg  4 mg Oral Q6H PRN    haloperidol (HALDOL) 2 mg/mL oral solution 2 mg  2 mg SubLINGual Q6H PRN    Or    haloperidol lactate (HALDOL) injection 2 mg  2 mg IntraVENous Q6H PRN    scopolamine (TRANSDERM-SCOP) 1 mg over 3 days 1 Patch  1 Patch TransDERmal Q72H PRN    sodium chloride (NS) flush 5-10 mL  5-10 mL IntraVENous PRN    sodium chloride (NS) flush 5-40 mL  5-40 mL IntraVENous PRN    acetaminophen (TYLENOL) tablet 650 mg  650 mg Oral Q6H PRN    Or    acetaminophen (TYLENOL) suppository 650 mg  650 mg Rectal Q6H PRN    polyethylene glycol (MIRALAX) packet 17 g  17 g Oral DAILY PRN    bisacodyL (DULCOLAX) tablet 5 mg  5 mg Oral DAILY PRN    ondansetron (ZOFRAN ODT) tablet 4 mg  4 mg Oral Q6H PRN    Or    ondansetron (ZOFRAN) injection 4 mg  4 mg IntraVENous Q6H PRN        REVIEW OF SYSTEMS    Review of Systems   Unable to perform ROS: Dementia        Objective:     Visit Vitals  /75 (BP Patient Position: At rest;Head of bed elevated (Comment degrees)) Comment (BP Patient Position): 20   Pulse (!) 121   Temp 99.3 °F (37.4 °C)   Resp 20   Ht 5' 5\" (1.651 m)   Wt 43.6 kg (96 lb 1.9 oz)   SpO2 94%   BMI 16.00 kg/m²    O2 Flow Rate (L/min): 1 l/min O2 Device: Nasal cannula    Temp (24hrs), Av.3 °F (37.4 °C), Min:98.9 °F (37.2 °C), Max:99.6 °F (37.6 °C)      No intake/output data recorded.  1901 - 01/10 0700  In: -   Out: 400     PHYSICAL EXAM:    Physical Exam  Constitutional:       General: She is in acute distress. Appearance: She is ill-appearing and toxic-appearing. Cardiovascular:      Rate and Rhythm: Tachycardia present. Pulmonary:      Effort: Respiratory distress present. Abdominal:      Comments: PEG tube   Neurological:      Mental Status: She is disoriented. Motor: Weakness present.           Data Review    No results found for this or any previous visit (from the past 24 hour(s)). XR CHEST PORT   Final Result   IMPRESSION: Bilateral pleural effusions, right greater than left. Bilateral   airspace disease, with differential diagnosis including pulmonary edema and/or   pneumonia. XR CHEST PORT   Final Result   FINDINGS: Impression: Frontal supine single view chest does not include the left   lower lateral lung. Right dialysis catheter distal tip right atrium. Cardiomegaly, vascular congestion. Right greater than left pulmonary airspace edema and/or pneumonia. Small to   moderate size right pleural effusion. No pneumothorax. No acute bony findings. Active Problems:    Vascular dementia with depressed mood ([address] (9/6/2018)      CKD (chronic kidney disease) requiring chronic dialysis (HCC) ()      Overview: ESRD/ARF Dialysi Tues-Thurs- Sat      Hypertension ()      Hemiplegia following cerebrovascular accident (CVA) ([address] (6/17/2020)      Gastrostomy tube dependent ([address] (6/17/2020)      Anemia of chronic disease (6/17/2020)      Iron deficiency anemia (6/17/2020)      Dysphagia (6/23/2020)      Hypothermia (1/6/2021)      Hypotension (1/6/2021)      Sepsis ([address] (1/6/2021)        Assessment/Plan:     1. Sepsis   2. ESRD on HD  3. Respiratory distress with hypoxia  4. Tachypnea  5. COVID-19 test: Negative   6. CVA with residual: Wheelchair-bound  7. Dementia: With behavioral disturbance  8. Failure to thrive   9. Non-STEMI  10. Elevated troponin  11. History of severe MR  12. Acute anemia    Family has decided to make the patient comfort care only. Discontinuation of antibiotics, cardiac medications, supplemental oxygen and hemodialysis withdrawn. Comfort measures including pain management, anxiety management humidified air, and skin care. Discussed at length transitioning from inpatient comfort care to hospice. Currently the patient is unresponsive with agonal breathing.     DVT Prophylaxis: Wheelchair-bound not needed  Code Status: DNR  POA/NOK:  ______________________________________________________________________________  Time spent in direct care including coordination of service, review of data and examination: > 35 minutes  Care Plan discussed with: RN, consults and daughters. Her family visits we can withdrawal all treatment care and focus on comfort care measures only including oxygen nutrition and pain management.    ______________________________________________________________________________    Nina Martel NP    This is dictation was done by [employer] voice recognition software. Quite often unanticipated grammatical, syntax, homophones and other interpretive errors or inadvertently transcribed by the computer software. Please excuse errors that have escaped final proofreading. Thank you.

## 2021-01-11 PROBLEM — I21.4 NON-STEMI (NON-ST ELEVATED MYOCARDIAL INFARCTION) (HCC): Status: ACTIVE | Noted: 2021-01-01

## 2021-01-11 NOTE — PROGRESS NOTES
Bedside and Verbal shift change report given to Vanna Perry RN (oncoming nurse) by Shahzad Boateng RN (offgoing nurse). Report included the following information SBAR and Kardex.

## 2021-01-11 NOTE — DEATH NOTE
Patient time of death noted at 05:16. Family/next-of-kin in patient's room alerted nurses that they believed she may have passed. Two RNs verified the absence of a pulse. Physician (Dr. Ken Dill) notified at 05:30. [employer] notified at 05:40.

## 2021-01-11 NOTE — DISCHARGE SUMMARY
Death Summary     Patient ID:    Emmie Monteiro  [id number]  76 y.o.  1955    Admit date: 1/6/2021    Date and time of death: 1/11/2021, at 5:16 AM    Chronic Diagnoses:    Problem List as of 1/11/2021 Date Reviewed: 1/4/2021          Codes Class Noted - Resolved    Non-STEMI (non-ST elevated myocardial infarction) [facility]) ICD-10-CM: I21.4  ICD-9-CM: 410.70  1/11/2021 - Present        Hypothermia ICD-10-CM: T68. Angelica Guanakito  ICD-9-CM: 991.6  1/6/2021 - Present        Hypotension ICD-10-CM: I95.9  ICD-9-CM: 458.9  1/6/2021 - Present        Sepsis ([facility] ICD-10-CM: A41.9  ICD-9-CM: 038.9, 995.91  1/6/2021 - Present        Anemia ICD-10-CM: D64.9  ICD-9-CM: 285.9  11/20/2020 - Present        Dysphagia ICD-10-CM: R13.10  ICD-9-CM: 787.20  6/23/2020 - Present        Lumbosacral spondylosis without myelopathy ICD-10-CM: M47.817  ICD-9-CM: 721.3  6/17/2020 - Present    Overview Signed 6/17/2020  8:04 AM by Armando Jung MD     Followed by Interventional Pain and Spine Specialist.              Hemiplegia following [facility]vascular accident ([facility]) ([facility] ICD-10-CM: V81.404  ICD-9-CM: 438.20  6/17/2020 - Present        Gastrostomy tube dependent ([facility] ICD-10-CM: Z93.1  ICD-9-CM: V44.1  6/17/2020 - Present        Anemia of chronic disease ICD-10-CM: D63.8  ICD-9-CM: 285.29  6/17/2020 - Present        Iron deficiency anemia ICD-10-CM: D50.9  ICD-9-CM: 280.9  6/17/2020 - Present        CKD (chronic kidney disease) requiring chronic dialysis ([facility] ICD-10-CM: N18.6, Z99.2  ICD-9-CM: 585.6, V45.11  Unknown - Present    Overview Signed 9/19/2018  8:57 AM by Vu Brown LPN     ESRD/ARF Dialysi Tues-Thurs- Sat             Hypertension ICD-10-CM: I10  ICD-9-CM: 401.9  Unknown - Present        Vascular dementia with depressed mood ([facility] ICD-10-CM: F01.51, F32.9  ICD-9-CM: 290.43  9/6/2018 - Present        Urinary incontinence ICD-10-CM: R32  ICD-9-CM: 788.30  9/6/2018 - Present        Insomnia ICD-10-CM: G47.00  ICD-9-CM: 780.52  9/6/2018 - Present          22    Final Diagnoses: Active Problems:    Vascular dementia with depressed mood ([facility] (9/6/2018)      CKD (chronic kidney disease) requiring chronic dialysis (HCC) ()      [facility]      Hypertension ()      Hemiplegia following cerebrovascular accident (CVA) ([facility] (6/17/2020)      Gastrostomy tube dependent ([facility] (6/17/2020)      Anemia of chronic disease (6/17/2020)      Iron deficiency anemia (6/17/2020)      Dysphagia (6/23/2020)      Hypothermia (1/6/2021)      Hypotension (1/6/2021)      Sepsis ([facility] (1/6/2021)      Non-STEMI (non-ST elevated myocardial infarction) ([facility] (1/11/2021)        Reason for Hospitalization:  Ashlee Rust a 76 y.o. female who Admitted on [phone] [id number]. PMH never came for chronic renal failure, arthritis, DM, ESRD on HD, GERD, gout, hypertension, iron deficiency anemia, dementia, wheelchair-bound.  Family called EMS after she became lethargic nonresponsive at home.  Patiently seen outpatient for dental infection and placed on amoxicillin 2 days ago. Hospital Course:    EMS found her hypotensive systolic blood pressure in the 70s and tachycardic.  In the ED she is hypothermic at 90.5, tachycardic 124, breathing 20-40 times, BP 80/57.  Chest x-ray cardiomegaly, vascular congestion are >L airspace edema and/or pneumonia, Small to moderate size right pleural effusion.  Significant lab values hemoglobin 6.2, platelets 56, lactic acid 3.1, procalcitonin 20.24, troponin 1.97.  Will admit for further management of presumed sepsis, hypotension, hypothermia, tachycardia, tachypneic and non-STEMI.  Will start on IV vancomycin and cefepime empirically.  Consult ID.  Will transfuse 1 unit PRBC.     Cardiology consulted. 2D echo pending. Received 1 unit PRBC for hemoglobin 6.2, diuresed gently after blood products. Cardiogram revealing large thrombus on mitral valve. Poor prognosis not a surgical candidate. Family has decided on comfort care. All progressive care measures discontinued including hemodialysis, antibiotics and cardiac medications. Comfort measures including pain and anxiety medications humidified oxygen and skin care initiated. She is unresponsive with agonal breathing. At 5:16 AM on 1/11/2021 family noted the patient was not breathing. To nurses verify the absence of a pulse. Family at the bedside.  support called throughout her visit.       Signed:  Armaan Gabriel NP  1/11/2021  1:14 PM

## 2021-04-21 NOTE — DISCHARGE INSTRUCTIONS
Leave skin glue on for 1-2 weeks. Can bathe arm as routine. DISCHARGE SUMMARY from Nurse    PATIENT INSTRUCTIONS:    After general anesthesia or intravenous sedation, for 24 hours or while taking prescription Narcotics:  · Limit your activities  · Do not drive and operate hazardous machinery  · Do not make important personal or business decisions  · Do  not drink alcoholic beverages  · If you have not urinated within 8 hours after discharge, please contact your surgeon on call. Report the following to your surgeon:  · Excessive pain, swelling, redness or odor of or around the surgical area  · Temperature over 100.5  · Nausea and vomiting lasting longer than 4 hours or if unable to take medications  · Any signs of decreased circulation or nerve impairment to extremity: change in color, persistent  numbness, tingling, coldness or increase pain  · Any questions    The discharge information has been reviewed with the {PATIENT PARENT GUARDIAN:[id number]}. The {PATIENT PARENT GUARDIAN:62370} verbalized understanding. Discharge medications reviewed with the {Dishcarge meds reviewed KXAY:[id number]} and appropriate educational materials and side effects teaching were provided.   ___________________________________________________________________________________________________________________________________
at least 3/5 BL UE & LE based on antigravity mobility./grossly assessed due to

## (undated) DEVICE — DRAPE,REIN 53X77,STERILE: Brand: MEDLINE

## (undated) DEVICE — REM POLYHESIVE ADULT PATIENT RETURN ELECTRODE: Brand: VALLEYLAB

## (undated) DEVICE — STERILE POLYISOPRENE POWDER-FREE SURGICAL GLOVES WITH EMOLLIENT COATING: Brand: PROTEXIS

## (undated) DEVICE — (D)PREP SKN CHLRAPRP APPL 26ML -- CONVERT TO ITEM 371833

## (undated) DEVICE — GOWN,SIRUS,FABRNF,XL,20/CS: Brand: MEDLINE

## (undated) DEVICE — BANDAGE,GAUZE,CONFORMING,4"X75",STRL,LF: Brand: MEDLINE

## (undated) DEVICE — SUTURE VCRL SZ 3-0 L27IN ABSRB UD L26MM SH 1/2 CIR J416H

## (undated) DEVICE — ROCKER SWITCH PENCIL BLADE ELECTRODE, HOLSTER: Brand: EDGE

## (undated) DEVICE — SOLUTION IV 1000ML 0.9% SOD CHL

## (undated) DEVICE — APPLICATOR BNDG 1MM ADH PREMIERPRO EXOFIN

## (undated) DEVICE — STRAP,POSITIONING,KNEE/BODY,FOAM,4X60": Brand: MEDLINE

## (undated) DEVICE — HANDLE LT SNAP ON ULT DURABLE LENS FOR TRUMPF ALC DISPOSABLE

## (undated) DEVICE — INFECTION CONTROL KIT SYS

## (undated) DEVICE — SUTURE PERMAHAND SZ 0 L30IN NONABSORBABLE BLK SILK BRAID A306H

## (undated) DEVICE — SUTURE MCRYL SZ 4-0 L27IN ABSRB UD L19MM PS-2 1/2 CIR PRIM Y426H

## (undated) DEVICE — Device

## (undated) DEVICE — DRAPE,EXTREMITY,89X128,STERILE: Brand: MEDLINE